# Patient Record
Sex: MALE | Race: OTHER | Employment: UNEMPLOYED | URBAN - METROPOLITAN AREA
[De-identification: names, ages, dates, MRNs, and addresses within clinical notes are randomized per-mention and may not be internally consistent; named-entity substitution may affect disease eponyms.]

---

## 2020-10-01 ENCOUNTER — APPOINTMENT (OUTPATIENT)
Dept: GENERAL RADIOLOGY | Age: 40
DRG: 710 | End: 2020-10-01
Payer: MEDICAID

## 2020-10-01 ENCOUNTER — HOSPITAL ENCOUNTER (INPATIENT)
Age: 40
LOS: 7 days | Discharge: HOME | DRG: 710 | End: 2020-10-08
Attending: EMERGENCY MEDICINE | Admitting: INTERNAL MEDICINE
Payer: MEDICAID

## 2020-10-01 PROBLEM — R00.0 TACHYCARDIA: Status: ACTIVE | Noted: 2020-10-01

## 2020-10-01 PROBLEM — I10 ESSENTIAL HYPERTENSION: Status: ACTIVE | Noted: 2020-10-01

## 2020-10-01 PROBLEM — A41.9 SEPSIS (HCC): Status: ACTIVE | Noted: 2020-10-01

## 2020-10-01 PROBLEM — E11.9 DMII (DIABETES MELLITUS, TYPE 2) (HCC): Status: ACTIVE | Noted: 2020-10-01

## 2020-10-01 PROBLEM — D72.9 NEUTROPHILIC LEUKOCYTOSIS: Status: ACTIVE | Noted: 2020-10-01

## 2020-10-01 PROBLEM — R50.9 FEVER: Status: ACTIVE | Noted: 2020-10-01

## 2020-10-01 PROBLEM — L03.115 CELLULITIS OF RIGHT FOOT: Status: ACTIVE | Noted: 2020-10-01

## 2020-10-01 LAB
A/G RATIO: 0.6 (ref 1.1–2.2)
ALBUMIN SERPL-MCNC: 2.8 G/DL (ref 3.4–5)
ALP BLD-CCNC: 158 U/L (ref 40–129)
ALT SERPL-CCNC: 15 U/L (ref 10–40)
ANION GAP SERPL CALCULATED.3IONS-SCNC: 8 MMOL/L (ref 3–16)
AST SERPL-CCNC: 12 U/L (ref 15–37)
BASOPHILS ABSOLUTE: 0.1 K/UL (ref 0–0.2)
BASOPHILS RELATIVE PERCENT: 0.6 %
BILIRUB SERPL-MCNC: 0.7 MG/DL (ref 0–1)
BUN BLDV-MCNC: 11 MG/DL (ref 7–20)
CALCIUM SERPL-MCNC: 8.6 MG/DL (ref 8.3–10.6)
CHLORIDE BLD-SCNC: 94 MMOL/L (ref 99–110)
CO2: 28 MMOL/L (ref 21–32)
CREAT SERPL-MCNC: 0.7 MG/DL (ref 0.9–1.3)
EOSINOPHILS ABSOLUTE: 0 K/UL (ref 0–0.6)
EOSINOPHILS RELATIVE PERCENT: 0.3 %
GFR AFRICAN AMERICAN: >60
GFR NON-AFRICAN AMERICAN: >60
GLOBULIN: 4.5 G/DL
GLUCOSE BLD-MCNC: 241 MG/DL (ref 70–99)
GLUCOSE BLD-MCNC: 277 MG/DL (ref 70–99)
HCT VFR BLD CALC: 42.5 % (ref 40.5–52.5)
HEMOGLOBIN: 14.2 G/DL (ref 13.5–17.5)
LACTIC ACID, SEPSIS: 1.2 MMOL/L (ref 0.4–1.9)
LYMPHOCYTES ABSOLUTE: 1.3 K/UL (ref 1–5.1)
LYMPHOCYTES RELATIVE PERCENT: 10.2 %
MAGNESIUM: 1.9 MG/DL (ref 1.8–2.4)
MCH RBC QN AUTO: 29.5 PG (ref 26–34)
MCHC RBC AUTO-ENTMCNC: 33.4 G/DL (ref 31–36)
MCV RBC AUTO: 88.3 FL (ref 80–100)
MONOCYTES ABSOLUTE: 1.2 K/UL (ref 0–1.3)
MONOCYTES RELATIVE PERCENT: 10.2 %
NEUTROPHILS ABSOLUTE: 9.6 K/UL (ref 1.7–7.7)
NEUTROPHILS RELATIVE PERCENT: 78.7 %
PDW BLD-RTO: 13.8 % (ref 12.4–15.4)
PERFORMED ON: ABNORMAL
PLATELET # BLD: 205 K/UL (ref 135–450)
PMV BLD AUTO: 8.4 FL (ref 5–10.5)
POTASSIUM REFLEX MAGNESIUM: 3.5 MMOL/L (ref 3.5–5.1)
RBC # BLD: 4.82 M/UL (ref 4.2–5.9)
SODIUM BLD-SCNC: 130 MMOL/L (ref 136–145)
TOTAL PROTEIN: 7.3 G/DL (ref 6.4–8.2)
WBC # BLD: 12.2 K/UL (ref 4–11)

## 2020-10-01 PROCEDURE — 83605 ASSAY OF LACTIC ACID: CPT

## 2020-10-01 PROCEDURE — 6370000000 HC RX 637 (ALT 250 FOR IP): Performed by: INTERNAL MEDICINE

## 2020-10-01 PROCEDURE — 2580000003 HC RX 258: Performed by: INTERNAL MEDICINE

## 2020-10-01 PROCEDURE — 2580000003 HC RX 258: Performed by: PHYSICIAN ASSISTANT

## 2020-10-01 PROCEDURE — 87040 BLOOD CULTURE FOR BACTERIA: CPT

## 2020-10-01 PROCEDURE — 6360000002 HC RX W HCPCS: Performed by: PHYSICIAN ASSISTANT

## 2020-10-01 PROCEDURE — 73630 X-RAY EXAM OF FOOT: CPT

## 2020-10-01 PROCEDURE — 85025 COMPLETE CBC W/AUTO DIFF WBC: CPT

## 2020-10-01 PROCEDURE — 6360000002 HC RX W HCPCS: Performed by: INTERNAL MEDICINE

## 2020-10-01 PROCEDURE — 93971 EXTREMITY STUDY: CPT

## 2020-10-01 PROCEDURE — 83735 ASSAY OF MAGNESIUM: CPT

## 2020-10-01 PROCEDURE — 80053 COMPREHEN METABOLIC PANEL: CPT

## 2020-10-01 PROCEDURE — 1200000000 HC SEMI PRIVATE

## 2020-10-01 PROCEDURE — 6370000000 HC RX 637 (ALT 250 FOR IP): Performed by: PHYSICIAN ASSISTANT

## 2020-10-01 PROCEDURE — 99284 EMERGENCY DEPT VISIT MOD MDM: CPT

## 2020-10-01 RX ORDER — KETOROLAC TROMETHAMINE 30 MG/ML
15 INJECTION, SOLUTION INTRAMUSCULAR; INTRAVENOUS ONCE
Status: COMPLETED | OUTPATIENT
Start: 2020-10-01 | End: 2020-10-01

## 2020-10-01 RX ORDER — ACETAMINOPHEN 325 MG/1
650 TABLET ORAL EVERY 4 HOURS PRN
Status: DISCONTINUED | OUTPATIENT
Start: 2020-10-01 | End: 2020-10-08 | Stop reason: HOSPADM

## 2020-10-01 RX ORDER — SODIUM CHLORIDE 0.9 % (FLUSH) 0.9 %
10 SYRINGE (ML) INJECTION EVERY 12 HOURS SCHEDULED
Status: DISCONTINUED | OUTPATIENT
Start: 2020-10-01 | End: 2020-10-08 | Stop reason: HOSPADM

## 2020-10-01 RX ORDER — ONDANSETRON 2 MG/ML
4 INJECTION INTRAMUSCULAR; INTRAVENOUS EVERY 4 HOURS PRN
Status: DISCONTINUED | OUTPATIENT
Start: 2020-10-01 | End: 2020-10-08 | Stop reason: HOSPADM

## 2020-10-01 RX ORDER — INSULIN LISPRO 100 [IU]/ML
0-12 INJECTION, SOLUTION INTRAVENOUS; SUBCUTANEOUS
Status: DISCONTINUED | OUTPATIENT
Start: 2020-10-02 | End: 2020-10-03

## 2020-10-01 RX ORDER — ACETAMINOPHEN 500 MG
1000 TABLET ORAL ONCE
Status: COMPLETED | OUTPATIENT
Start: 2020-10-01 | End: 2020-10-01

## 2020-10-01 RX ORDER — POLYETHYLENE GLYCOL 3350 17 G/17G
17 POWDER, FOR SOLUTION ORAL DAILY PRN
Status: DISCONTINUED | OUTPATIENT
Start: 2020-10-01 | End: 2020-10-08 | Stop reason: HOSPADM

## 2020-10-01 RX ORDER — 0.9 % SODIUM CHLORIDE 0.9 %
1000 INTRAVENOUS SOLUTION INTRAVENOUS ONCE
Status: COMPLETED | OUTPATIENT
Start: 2020-10-01 | End: 2020-10-01

## 2020-10-01 RX ORDER — NICOTINE POLACRILEX 4 MG
15 LOZENGE BUCCAL PRN
Status: DISCONTINUED | OUTPATIENT
Start: 2020-10-01 | End: 2020-10-03 | Stop reason: SDUPTHER

## 2020-10-01 RX ORDER — DEXTROSE MONOHYDRATE 25 G/50ML
12.5 INJECTION, SOLUTION INTRAVENOUS PRN
Status: DISCONTINUED | OUTPATIENT
Start: 2020-10-01 | End: 2020-10-03 | Stop reason: SDUPTHER

## 2020-10-01 RX ORDER — SODIUM CHLORIDE 0.9 % (FLUSH) 0.9 %
10 SYRINGE (ML) INJECTION PRN
Status: DISCONTINUED | OUTPATIENT
Start: 2020-10-01 | End: 2020-10-08 | Stop reason: HOSPADM

## 2020-10-01 RX ORDER — DEXTROSE MONOHYDRATE 50 MG/ML
100 INJECTION, SOLUTION INTRAVENOUS PRN
Status: DISCONTINUED | OUTPATIENT
Start: 2020-10-01 | End: 2020-10-03 | Stop reason: SDUPTHER

## 2020-10-01 RX ORDER — ACETAMINOPHEN 650 MG/1
650 SUPPOSITORY RECTAL EVERY 4 HOURS PRN
Status: DISCONTINUED | OUTPATIENT
Start: 2020-10-01 | End: 2020-10-08 | Stop reason: HOSPADM

## 2020-10-01 RX ORDER — LOSARTAN POTASSIUM 25 MG/1
50 TABLET ORAL DAILY
Status: DISCONTINUED | OUTPATIENT
Start: 2020-10-01 | End: 2020-10-03

## 2020-10-01 RX ORDER — 0.9 % SODIUM CHLORIDE 0.9 %
1200 INTRAVENOUS SOLUTION INTRAVENOUS ONCE
Status: COMPLETED | OUTPATIENT
Start: 2020-10-01 | End: 2020-10-01

## 2020-10-01 RX ORDER — SODIUM CHLORIDE 9 MG/ML
INJECTION, SOLUTION INTRAVENOUS CONTINUOUS
Status: DISCONTINUED | OUTPATIENT
Start: 2020-10-01 | End: 2020-10-03

## 2020-10-01 RX ORDER — ONDANSETRON 2 MG/ML
4 INJECTION INTRAMUSCULAR; INTRAVENOUS ONCE
Status: COMPLETED | OUTPATIENT
Start: 2020-10-01 | End: 2020-10-01

## 2020-10-01 RX ORDER — INSULIN LISPRO 100 [IU]/ML
0-6 INJECTION, SOLUTION INTRAVENOUS; SUBCUTANEOUS NIGHTLY
Status: DISCONTINUED | OUTPATIENT
Start: 2020-10-01 | End: 2020-10-03

## 2020-10-01 RX ADMIN — INSULIN LISPRO 2 UNITS: 100 INJECTION, SOLUTION INTRAVENOUS; SUBCUTANEOUS at 21:07

## 2020-10-01 RX ADMIN — LOSARTAN POTASSIUM 50 MG: 25 TABLET, FILM COATED ORAL at 21:07

## 2020-10-01 RX ADMIN — KETOROLAC TROMETHAMINE 15 MG: 30 INJECTION, SOLUTION INTRAMUSCULAR at 15:25

## 2020-10-01 RX ADMIN — VANCOMYCIN HYDROCHLORIDE 1500 MG: 10 INJECTION, POWDER, LYOPHILIZED, FOR SOLUTION INTRAVENOUS at 15:58

## 2020-10-01 RX ADMIN — CEFEPIME HYDROCHLORIDE 2 G: 2 INJECTION, POWDER, FOR SOLUTION INTRAVENOUS at 15:25

## 2020-10-01 RX ADMIN — ENOXAPARIN SODIUM 40 MG: 40 INJECTION SUBCUTANEOUS at 19:00

## 2020-10-01 RX ADMIN — SODIUM CHLORIDE 1200 ML: 9 INJECTION, SOLUTION INTRAVENOUS at 17:30

## 2020-10-01 RX ADMIN — SODIUM CHLORIDE 1000 ML: 9 INJECTION, SOLUTION INTRAVENOUS at 15:27

## 2020-10-01 RX ADMIN — ACETAMINOPHEN 1000 MG: 500 TABLET, FILM COATED ORAL at 15:26

## 2020-10-01 RX ADMIN — SODIUM CHLORIDE: 9 INJECTION, SOLUTION INTRAVENOUS at 17:30

## 2020-10-01 RX ADMIN — ONDANSETRON 4 MG: 2 INJECTION INTRAMUSCULAR; INTRAVENOUS at 15:25

## 2020-10-01 ASSESSMENT — PAIN DESCRIPTION - LOCATION
LOCATION: FOOT
LOCATION: LEG

## 2020-10-01 ASSESSMENT — ENCOUNTER SYMPTOMS
CHEST TIGHTNESS: 0
VOMITING: 0
ABDOMINAL PAIN: 0
DIARRHEA: 0
COLOR CHANGE: 1
SHORTNESS OF BREATH: 0
NAUSEA: 0

## 2020-10-01 ASSESSMENT — PAIN SCALES - GENERAL
PAINLEVEL_OUTOF10: 6
PAINLEVEL_OUTOF10: 4
PAINLEVEL_OUTOF10: 6
PAINLEVEL_OUTOF10: 6

## 2020-10-01 ASSESSMENT — PAIN DESCRIPTION - ORIENTATION: ORIENTATION: RIGHT

## 2020-10-01 ASSESSMENT — PAIN DESCRIPTION - PAIN TYPE
TYPE: ACUTE PAIN
TYPE: ACUTE PAIN

## 2020-10-01 NOTE — CONSULTS
Clinical Pharmacy Note: Pharmacy to Dose Vancomycin    Pilar Nicolas is a 45 y.o. male started on Vancomycin for foot cellulitis and wounds; consult received from Dr. Fiorella Dela Cruz to manage therapy. Also receiving the following antibiotics: Cefepime 2 g every 12 hours. Goal Trough Level: 15-20 mcg/mL    Assessment/Plan:  Initiate vancomycin 1500 mg IV every 12 hours. A vancomycin trough has been ordered for 10/3 @ 0300, prior to the 5th dose. Changes in regimen will be determined based on culture results, renal function, and clinical response. Pharmacy will continue to monitor and adjust regimen as necessary. Allergies:  Patient has no known allergies. Recent Labs     10/01/20  1536   CREATININE 0.7*       Recent Labs     10/01/20  1536   WBC 12.2*       Ht Readings from Last 1 Encounters:   10/01/20 5' 10\" (1.778 m)        Wt Readings from Last 1 Encounters:   10/01/20 226 lb 8 oz (102.7 kg)         Estimated Creatinine Clearance: 170 mL/min (A) (based on SCr of 0.7 mg/dL (L)).       Thank you for the consult,    Cherie Tay, PharmD  PGY-1 Pharmacy Resident  [id number]

## 2020-10-01 NOTE — ED PROVIDER NOTES
PE is also reported that it does not appear that he is ever had a vascular study performed in his lower extremities. He does have reports of right calf pain with reproducible symptoms with Homans examination. He has had chills but is unsure if he has had a documented fever and does have a low-grade fever here in the emergency department. He denies any is having chest pain palpitations or shortness of breath. He reports that he has had some mild headache pain that is been intermittent but is currently headache free. Patient's had no potential for sick contacts. He states he has not had recent antibiotic treatment. Patient goes on to report that he has no additional complaints voiced at present. Nursing Notes were all reviewed and agreed with or any disagreements were addressed in the HPI. REVIEW OF SYSTEMS    (2-9 systems for level 4, 10 or more for level 5)     Review of Systems   Constitutional: Positive for chills. Negative for activity change and fever. Respiratory: Negative for chest tightness and shortness of breath. Cardiovascular: Positive for leg swelling. Negative for chest pain and palpitations. Gastrointestinal: Negative for abdominal pain, diarrhea, nausea and vomiting. Genitourinary: Negative for dysuria and flank pain. Musculoskeletal: Positive for gait problem. Skin: Positive for color change, rash and wound. Neurological: Negative for seizures, syncope and light-headedness. Positives and Pertinent negatives as per HPI. Except as noted above in the ROS, all other systems were reviewed and negative. PAST MEDICAL HISTORY   History reviewed. No pertinent past medical history. SURGICAL HISTORY   History reviewed. No pertinent surgical history. CURRENTMEDICATIONS       Previous Medications    No medications on file       ALLERGIES     Patient has no known allergies. FAMILYHISTORY     History reviewed. No pertinent family history.        SOCIAL HISTORY       Social History     Tobacco Use    Smoking status: Never Smoker    Smokeless tobacco: Never Used   Substance Use Topics    Alcohol use: Yes     Comment: occ    Drug use: Never       SCREENINGS             PHYSICAL EXAM    (up to 7 for level 4, 8 or more for level 5)     ED Triage Vitals [10/01/20 1121]   BP Temp Temp Source Pulse Resp SpO2 Height Weight   (!) 174/114 100.4 °F (38 °C) Oral 112 18 96 % 5' 10\" (1.778 m) 226 lb 8 oz (102.7 kg)       Physical Exam  Vitals signs and nursing note reviewed. Constitutional:       General: He is awake. He is not in acute distress. Appearance: Normal appearance. He is well-developed. He is not ill-appearing or diaphoretic. HENT:      Head: Normocephalic and atraumatic. Right Ear: External ear normal.      Left Ear: External ear normal.   Eyes:      General: No scleral icterus. Right eye: No discharge. Left eye: No discharge. Conjunctiva/sclera: Conjunctivae normal.   Neck:      Musculoskeletal: Normal range of motion. Vascular: No JVD. Cardiovascular:      Rate and Rhythm: Regular rhythm. Tachycardia present. Heart sounds: No murmur. No friction rub. No gallop. Comments: Calf is supple but firm. Reproducible tenderness with Homans on the right. Negative Homans on the left. Pulmonary:      Effort: Pulmonary effort is normal. No accessory muscle usage or respiratory distress. Breath sounds: Normal breath sounds. No wheezing, rhonchi or rales. Abdominal:      General: There is no distension. Palpations: Abdomen is soft. Abdomen is not rigid. There is no mass. Tenderness: There is no abdominal tenderness. There is no guarding or rebound. Musculoskeletal:      Right lower le+ Edema present. Left lower leg: No edema. Feet:    Skin:     General: Skin is warm and dry. Comments: Lesions on toes.   See details and clinical image capture   Neurological:      Mental Status: He is alert and oriented to person, place, and time. GCS: GCS eye subscore is 4. GCS verbal subscore is 5. GCS motor subscore is 6. Cranial Nerves: No cranial nerve deficit. Sensory: No sensory deficit. Coordination: Coordination normal.   Psychiatric:         Behavior: Behavior normal. Behavior is cooperative. Clinical image:          DIAGNOSTIC RESULTS   LABS:    Labs Reviewed   CBC WITH AUTO DIFFERENTIAL - Abnormal; Notable for the following components:       Result Value    WBC 12.2 (*)     Neutrophils Absolute 9.6 (*)     All other components within normal limits    Narrative:     Performed at:  [facility] [address]  [place], [place]   Phone [phone]   COMPREHENSIVE METABOLIC PANEL W/ REFLEX TO MG FOR LOW K - Abnormal; Notable for the following components:    Sodium 130 (*)     Chloride 94 (*)     Glucose 277 (*)     CREATININE 0.7 (*)     Alb 2.8 (*)     Albumin/Globulin Ratio 0.6 (*)     Alkaline Phosphatase 158 (*)     AST 12 (*)     All other components within normal limits    Narrative:     Performed at:  [facility] [address]  [place], [place]   Phone [phone]   CULTURE, BLOOD 1   CULTURE, BLOOD 2   LACTATE, SEPSIS    Narrative:     Performed at:  [facility]  [address] [employer]  [place], [place]   Phone [phone]   MAGNESIUM    Narrative:     Performed at:  [facility] [address]  [place], [place]   Phone [phone]   URINE RT REFLEX TO CULTURE       All other labs were within normal range or not returned as of this dictation. EKG: All EKG's are interpreted by the Emergency Department Physician in the absence of a cardiologist.  Please see their note for interpretation of EKG.       RADIOLOGY:   Non-plain film images such as CT, Ultrasound and MRI are read by the radiologist. Plain radiographic images Room Number         0025   Corporate ID      [id number]           Trice Mendez   Ordering          Betty Pineda., Interpreting        Og Joya MD  Physician         PA                 Physician  Procedure Type of Study:   Veins:Lower Extremities DVT Study, VL EXTREMITY VENOUS DUPLEX RIGHT. Tech Comments Right No evidence of deep vein or superficial vein thrombosis involving the right lower extremity and the left common femoral vein. PROCEDURES   Unless otherwise noted below, none     Procedures    CRITICAL CARE TIME   Because of the high probability of sudden clinical deterioration of the patients condition and to prevent further deterioration, my critical care time involved my full attention to the patients condition, and included chart data review, documentation, medication ordering, viewing the patients old records, reevaluation of the patient's cardiac, pulmonary, and neurological status. Reassessing vital signs. Consutlations with off service physician. Ordering, interpreting reviewing diagnostic testing. Therefore my critical care time was 31 minutes of direct attention to the patients condition and did not include time spent on procedures.       SEP-1 CORE MEASURE DATA    Classification: sepsis    Amount of fluids ordered: at least 30mL/kg based on ideal body weight due to obesity defined as BMI >30 (patient's BMI is Body mass index is 32.5 kg/m².)    Time at which sepsis was identified: 1510    Broad-spectrum antibiotics chosen: based on sepsis order-set for a suspected source of: Skin and Soft Tissue    Repeat lactate level: not indicated    On reassessment after fluid resuscitation:   Vitals update: BP (!) 166/110   Pulse 112   Temp 100.4 °F (38 °C) (Oral)   Resp 18   Ht 5' 10\" (1.778 m)   Wt 226 lb 8 oz (102.7 kg)   SpO2 96%   BMI 32.50 kg/m² , cardiopulmonary exam: Improving tachycardia, capillary refill: Brisk and unchanged, peripheral pulses: Brisk and unchanged, skin examination: Unchanged      CONSULTS:  PHARMACY TO DOSE VANCOMYCIN      EMERGENCY DEPARTMENT COURSE and DIFFERENTIAL DIAGNOSIS/MDM:   Vitals:    Vitals:    10/01/20 1121 10/01/20 1442 10/01/20 1443 10/01/20 1530   BP: (!) 174/114 (!) 160/106  (!) 166/110   Pulse: 112      Resp: 18      Temp: 100.4 °F (38 °C)      TempSrc: Oral      SpO2: 96%  95% 96%   Weight: 226 lb 8 oz (102.7 kg)      Height: 5' 10\" (1.778 m)          Patient was given the following medications:  Medications   sodium chloride flush 0.9 % injection 10 mL (has no administration in time range)   sodium chloride flush 0.9 % injection 10 mL (has no administration in time range)   0.9 % sodium chloride bolus (has no administration in time range)   cefepime (MAXIPIME) 2 g IVPB minibag (0 g Intravenous Stopped 10/1/20 1558)   vancomycin (VANCOCIN) 1,500 mg in dextrose 5 % 250 mL IVPB (1,500 mg Intravenous New Bag 10/1/20 1558)   0.9 % sodium chloride bolus (0 mLs Intravenous Stopped 10/1/20 1659)   acetaminophen (TYLENOL) tablet 1,000 mg (1,000 mg Oral Given 10/1/20 1526)   ketorolac (TORADOL) injection 15 mg (15 mg Intravenous Given 10/1/20 1525)   ondansetron (ZOFRAN) injection 4 mg (4 mg Intravenous Given 10/1/20 1525)         The patient's detailed history of present illness is documented as above. Upon arrival to the emergency department the patient's vital signs are as documented. The patient is noted to be hemodynamically stable and afebrile. Physical examination findings are as above. IV access was obtained. Laboratory testing and work-up was initiated. Patient was given Toradol for pain relief and Tylenol for his low-grade fever. Laboratory testing work-up was initiated. Initiation of vancomycin and cefepime for cellulitis of skin with associated lymphangitic streaking.   CBC demonstrates leukocytosis this in addition to the patient's tachycardia as well as low-grade fevers concerning for sepsis. No evidence of anemia and/or thrombocytopenia. BUN is 11 creatinine 0.7 nonfasting glucose is 277 in someone who does not have a history of diabetes mellitus. Sodium 138 chloride 94 electrolytes and LFTs are as documented. Mild elevation in his alkaline phosphatase with no evidence of bilirubinemia and or transaminitis. Acid is not elevated at 1.2. Magnesium is 1.9. Right foot radiographs demonstrate soft tissue swelling with no evidence of acute bony abnormality. Right lower extremity Doppler examination demonstrates no evidence of superficial or deep vein thrombosis. In light of the above-mentioned the patient will require ongoing care management on an inpatient basis. Case was discussed directly with the Dr. Berna Delacruz the hospitalist who accepts the patient for admission for ongoing care management the diagnoses below. FINAL IMPRESSION      1. Sepsis, due to unspecified organism, unspecified whether acute organ dysfunction present ([address]    2. Cellulitis of skin with lymphangitis    3. Chronic ulcer of toe, limited to breakdown of skin, unspecified laterality ([address]    4.  Hyperglycemia          DISPOSITION/PLAN   DISPOSITION Admitted 10/01/2020 05:41:02 PM         (Please note that portions of this note were completed with a voice recognition program.  Efforts were made to edit the dictations but occasionally words are mis-transcribed.)    Lesli Viveros PA-C (electronically signed)           Cruz Lazo PA-C  10/01/20 7934

## 2020-10-01 NOTE — PROGRESS NOTES
Patient transferred from ED to [facility]. Upon arrival, BP elevated to 157/108, message sent to admitting doctor to see if something for BP can be ordered.

## 2020-10-01 NOTE — ED NOTES
Patient removed eva hose to expose wounds to second, third and forth toe on right foot.       Ericka Dial RN  10/01/20 0060

## 2020-10-01 NOTE — ED NOTES
Bed: 25  Expected date:   Expected time:   Means of arrival: Walk In  Comments:     Sindhu Conner RN  10/01/20 9111

## 2020-10-01 NOTE — ED PROVIDER NOTES
I personally evaluated and examined the patient in conjunction with the MAYRA and agree with the assessment, treatment plan and disposition of the patient as recorded by the MAYRA. I reviewed pertinent nursing notes, triage notes, vital signs, past medical history, family and social history, medications, and allergies. Complete review of systems was conducted by the MAYRA and/or myself. Review of systems is negative except as documented in the history of present illness. Brief HPI: This is a 73-year-old gentleman presents emergency department chief complaint of right third toe infection as well as swelling and redness of the right lower leg. No chest pain or shortness of breath. Positive associated fever. Pain is 3/10, sharp/burning without radiation. No alleviating aggravating factors. Physical Exam: General: Patient is in no acute distress   Head: Normocephalic, atraumatic, pupils are equal and reactive to light. EOMI. Neck: Neck is supple. No JVD noted. Cardiovascular: Capillary refill less than 2 seconds. Initially tachycardic on the monitor. Improved with IV fluids. Lungs: No respiratory distress  Abdomen: soft, non-tender, non-distended   Extremities: no lower extremity edema. Capillary refill is less than 2 seconds. Redness and slight swelling of the anterior lower leg on the  right. There is also a large amount of swelling with an ulcer with discharge on the top of the right third toe. Skin: no cyanosis or pallor; no rashes noted   Neuro: CN's 2-12 are grossly intact. No focal neurologic deficit appreciated. Sepsis panel initiated include aggressive fluid resuscitation and broad-spectrum antibiotics. X-ray performed of the right foot which is negative. DVT ultrasound performed is negative as well. Patient to be admitted for further management.     CRITICAL CARE TIME: 32 minutes excluding billable procedure time: aggressive fluid resuscitation in sepsis, multiple re-evaluations, complex MDM, potential for deterioration. FINAL IMPRESSION     1. Sepsis, due to unspecified organism, unspecified whether acute organ dysfunction present [address] [address]    2. Cellulitis of skin with lymphangitis    3. Chronic ulcer of toe, limited to breakdown of skin, unspecified laterality ([facility] [address]    4. Hyperglycemia            Electronically signed by:   Scott Hernandez DO  10/01/20 8813

## 2020-10-01 NOTE — ED NOTES
This RN gave report to RN, Emerald Lara. Nothing infusing upon transfer. VSS. No symptoms of distress noted. All belongings placed in bag and given to pt.  Pt wheeled by RN on floor to room     hospitals  10/01/20 9630

## 2020-10-02 LAB
ANION GAP SERPL CALCULATED.3IONS-SCNC: 10 MMOL/L (ref 3–16)
BASOPHILS ABSOLUTE: 0 K/UL (ref 0–0.2)
BASOPHILS RELATIVE PERCENT: 0.4 %
BUN BLDV-MCNC: 11 MG/DL (ref 7–20)
CALCIUM SERPL-MCNC: 8.1 MG/DL (ref 8.3–10.6)
CHLORIDE BLD-SCNC: 100 MMOL/L (ref 99–110)
CO2: 26 MMOL/L (ref 21–32)
CREAT SERPL-MCNC: 0.6 MG/DL (ref 0.9–1.3)
EOSINOPHILS ABSOLUTE: 0.2 K/UL (ref 0–0.6)
EOSINOPHILS RELATIVE PERCENT: 2.4 %
ESTIMATED AVERAGE GLUCOSE: 289.1 MG/DL
GFR AFRICAN AMERICAN: >60
GFR NON-AFRICAN AMERICAN: >60
GLUCOSE BLD-MCNC: 205 MG/DL (ref 70–99)
GLUCOSE BLD-MCNC: 212 MG/DL (ref 70–99)
GLUCOSE BLD-MCNC: 240 MG/DL (ref 70–99)
GLUCOSE BLD-MCNC: 264 MG/DL (ref 70–99)
GLUCOSE BLD-MCNC: 295 MG/DL (ref 70–99)
HBA1C MFR BLD: 11.7 %
HCT VFR BLD CALC: 39.4 % (ref 40.5–52.5)
HEMOGLOBIN: 13.2 G/DL (ref 13.5–17.5)
LYMPHOCYTES ABSOLUTE: 1.2 K/UL (ref 1–5.1)
LYMPHOCYTES RELATIVE PERCENT: 12.1 %
MAGNESIUM: 2.1 MG/DL (ref 1.8–2.4)
MCH RBC QN AUTO: 29.7 PG (ref 26–34)
MCHC RBC AUTO-ENTMCNC: 33.6 G/DL (ref 31–36)
MCV RBC AUTO: 88.6 FL (ref 80–100)
MONOCYTES ABSOLUTE: 1.1 K/UL (ref 0–1.3)
MONOCYTES RELATIVE PERCENT: 11.5 %
NEUTROPHILS ABSOLUTE: 7.1 K/UL (ref 1.7–7.7)
NEUTROPHILS RELATIVE PERCENT: 73.6 %
PDW BLD-RTO: 14.2 % (ref 12.4–15.4)
PERFORMED ON: ABNORMAL
PLATELET # BLD: 206 K/UL (ref 135–450)
PMV BLD AUTO: 8.6 FL (ref 5–10.5)
POTASSIUM REFLEX MAGNESIUM: 3.5 MMOL/L (ref 3.5–5.1)
RBC # BLD: 4.45 M/UL (ref 4.2–5.9)
SODIUM BLD-SCNC: 136 MMOL/L (ref 136–145)
WBC # BLD: 9.7 K/UL (ref 4–11)

## 2020-10-02 PROCEDURE — 6360000002 HC RX W HCPCS: Performed by: INTERNAL MEDICINE

## 2020-10-02 PROCEDURE — G0008 ADMIN INFLUENZA VIRUS VAC: HCPCS | Performed by: INTERNAL MEDICINE

## 2020-10-02 PROCEDURE — 83735 ASSAY OF MAGNESIUM: CPT

## 2020-10-02 PROCEDURE — 6370000000 HC RX 637 (ALT 250 FOR IP): Performed by: INTERNAL MEDICINE

## 2020-10-02 PROCEDURE — 83036 HEMOGLOBIN GLYCOSYLATED A1C: CPT

## 2020-10-02 PROCEDURE — 2580000003 HC RX 258: Performed by: INTERNAL MEDICINE

## 2020-10-02 PROCEDURE — 85025 COMPLETE CBC W/AUTO DIFF WBC: CPT

## 2020-10-02 PROCEDURE — 90686 IIV4 VACC NO PRSV 0.5 ML IM: CPT | Performed by: INTERNAL MEDICINE

## 2020-10-02 PROCEDURE — 80048 BASIC METABOLIC PNL TOTAL CA: CPT

## 2020-10-02 PROCEDURE — 1200000000 HC SEMI PRIVATE

## 2020-10-02 RX ORDER — NICOTINE POLACRILEX 4 MG
15 LOZENGE BUCCAL PRN
Status: DISCONTINUED | OUTPATIENT
Start: 2020-10-02 | End: 2020-10-02 | Stop reason: SDUPTHER

## 2020-10-02 RX ORDER — DEXTROSE MONOHYDRATE 50 MG/ML
100 INJECTION, SOLUTION INTRAVENOUS PRN
Status: DISCONTINUED | OUTPATIENT
Start: 2020-10-02 | End: 2020-10-02 | Stop reason: SDUPTHER

## 2020-10-02 RX ORDER — DEXTROSE MONOHYDRATE 25 G/50ML
12.5 INJECTION, SOLUTION INTRAVENOUS PRN
Status: DISCONTINUED | OUTPATIENT
Start: 2020-10-02 | End: 2020-10-02 | Stop reason: SDUPTHER

## 2020-10-02 RX ADMIN — INSULIN LISPRO 4 UNITS: 100 INJECTION, SOLUTION INTRAVENOUS; SUBCUTANEOUS at 16:15

## 2020-10-02 RX ADMIN — ENOXAPARIN SODIUM 40 MG: 40 INJECTION SUBCUTANEOUS at 10:18

## 2020-10-02 RX ADMIN — CEFEPIME HYDROCHLORIDE 2 G: 2 INJECTION, POWDER, FOR SOLUTION INTRAVENOUS at 03:39

## 2020-10-02 RX ADMIN — METFORMIN HYDROCHLORIDE 500 MG: 500 TABLET ORAL at 15:37

## 2020-10-02 RX ADMIN — VANCOMYCIN HYDROCHLORIDE 1500 MG: 10 INJECTION, POWDER, LYOPHILIZED, FOR SOLUTION INTRAVENOUS at 15:37

## 2020-10-02 RX ADMIN — INSULIN LISPRO 6 UNITS: 100 INJECTION, SOLUTION INTRAVENOUS; SUBCUTANEOUS at 11:34

## 2020-10-02 RX ADMIN — CEFEPIME HYDROCHLORIDE 2 G: 2 INJECTION, POWDER, FOR SOLUTION INTRAVENOUS at 13:53

## 2020-10-02 RX ADMIN — INSULIN LISPRO 4 UNITS: 100 INJECTION, SOLUTION INTRAVENOUS; SUBCUTANEOUS at 10:19

## 2020-10-02 RX ADMIN — LOSARTAN POTASSIUM 50 MG: 25 TABLET, FILM COATED ORAL at 10:19

## 2020-10-02 RX ADMIN — VANCOMYCIN HYDROCHLORIDE 1500 MG: 10 INJECTION, POWDER, LYOPHILIZED, FOR SOLUTION INTRAVENOUS at 04:15

## 2020-10-02 RX ADMIN — INFLUENZA A VIRUS A/VICTORIA/2454/2019 IVR-207 (H1N1) ANTIGEN (PROPIOLACTONE INACTIVATED), INFLUENZA A VIRUS A/HONG KONG/2671/2019 IVR-208 (H3N2) ANTIGEN (PROPIOLACTONE INACTIVATED), INFLUENZA B VIRUS B/VICTORIA/705/2018 BVR-11 ANTIGEN (PROPIOLACTONE INACTIVATED), INFLUENZA B VIRUS B/PHUKET/3073/2013 BVR-1B ANTIGEN (PROPIOLACTONE INACTIVATED) 0.5 ML: 15; 15; 15; 15 INJECTION, SUSPENSION INTRAMUSCULAR at 10:19

## 2020-10-02 RX ADMIN — SODIUM CHLORIDE: 9 INJECTION, SOLUTION INTRAVENOUS at 13:53

## 2020-10-02 RX ADMIN — INSULIN LISPRO 2 UNITS: 100 INJECTION, SOLUTION INTRAVENOUS; SUBCUTANEOUS at 20:10

## 2020-10-02 NOTE — PROGRESS NOTES
[facility] PROGRESS NOTE    10/2/2020 1:23 PM        Name: Sabrina Oliveros . Admitted: 10/1/2020  Primary Care Provider: No primary care provider on file. (Tel: None)    Brief Course: Patient is a 71-year-old man with no prior known history of chronic illness. He presents to the emergency room for evaluation of increased pain, swelling and erythema of his right forefoot. He reports having chills at home but does not know if he had a fever as he has not checked his temperature. He reports moderately severe burning pain of the distal aspect of his right foot. the emergency room he was found to have cellulitis of his right foot and associated sepsis. In addition, evaluation revealed previously undiagnosed hypertension and diabetes mellitus. He is being admitted for further evaluation and treatment. Associated signs and symptoms do not include nausea, vomiting, abdominal pain, hemoptysis, hematochezia, diarrhea, constipation or urinary symptoms.     CC: pain, swelling and erythema of his right foot  Subjective: Patient reports that he does not have a PCP and has not gotten any medical care in the past.  Denies having medical insurance. Pain in right foot improved since yesterday.     Reviewed interval ancillary notes    Current Medications  metFORMIN (GLUCOPHAGE) tablet 500 mg, BID WC  glucose (GLUTOSE) 40 % oral gel 15 g, PRN  dextrose 50 % IV solution, PRN  glucagon (rDNA) injection 1 mg, PRN  dextrose 5 % solution, PRN  sodium chloride flush 0.9 % injection 10 mL, 2 times per day  sodium chloride flush 0.9 % injection 10 mL, PRN  cefepime (MAXIPIME) 2 g IVPB minibag, Q12H  sodium chloride flush 0.9 % injection 10 mL, 2 times per day  sodium chloride flush 0.9 % injection 10 mL, PRN  acetaminophen (TYLENOL) tablet 650 mg, Q4H PRN    Or  acetaminophen (TYLENOL) suppository 650 mg, Q4H PRN  polyethylene glycol (GLYCOLAX) packet 17 g, Daily PRN  ondansetron (ZOFRAN) injection 4 mg, Q4H PRN  0.9 % sodium chloride infusion, Continuous  enoxaparin (LOVENOX) injection 40 mg, Daily  vancomycin (VANCOCIN) 1,500 mg in dextrose 5 % 250 mL IVPB, Q12H  losartan (COZAAR) tablet 50 mg, Daily  insulin lispro (1 Unit Dial) 0-12 Units, TID WC  insulin lispro (1 Unit Dial) 0-6 Units, Nightly  glucose (GLUTOSE) 40 % oral gel 15 g, PRN  dextrose 50 % IV solution, PRN  glucagon (rDNA) injection 1 mg, PRN  dextrose 5 % solution, PRN    Objective:  BP (!) 144/102   Pulse 93   Temp 98.9 °F (37.2 °C) (Oral)   Resp 18   Ht 5' 10\" (1.778 m)   Wt 226 lb 8 oz (102.7 kg)   SpO2 97%   BMI 32.50 kg/m²     Intake/Output Summary (Last 24 hours) at 10/2/2020 1323  Last data filed at 10/2/2020 1208  Gross per 24 hour   Intake 2752.5 ml   Output --   Net 2752.5 ml      Wt Readings from Last 3 Encounters:   10/01/20 226 lb 8 oz (102.7 kg)       General appearance:Overweight  male,  Appears comfortable  ENT: Moist oral mucosa. Trachea midline, no adenopathy. Cardiovascular: Regular rhythm, normal S1, S2. No murmur. No edema in lower extremities  Respiratory: Not using accessory muscles. Good inspiratory effort. Clear to auscultation bilaterally, no wheeze or crackles. GI: Abdomen soft, no tenderness, not distended, normal bowel sounds  Musculoskeletal: No cyanosis in digits, neck supple  Neurology: CN 2-12 grossly intact. No speech or motor deficits  Psych: Normal affect. Alert and oriented in time, place and person  Skin: Warm, dry, normal turgor, Right foot with deformity of toes, swelling and erythema, distal plantar aspect of his foot is very tender. Extremity exam shows brisk capillary refill.   Peripheral pulses are palpable in lower extremities     Labs and Tests:  CBC:   Recent Labs     10/01/20  1536 10/02/20  0525   WBC 12.2* 9.7   HGB 14.2 13.2*    206     BMP:    Recent Labs     10/01/20  1536 10/02/20  0525   * 136   K 3.5 3.5   CL 94* 100   CO2 28 26   BUN 11 11   CREATININE 0.7* 0.6*   GLUCOSE 277* 264*     Hepatic:   Recent Labs     10/01/20  1536   AST 12*   ALT 15   BILITOT 0.7   ALKPHOS 158*     VL Extremity Venous Right   Final Result      XR FOOT RIGHT (MIN 3 VIEWS)   Final Result   No acute osseous abnormality right foot. Soft tissue edema presumably reflecting cellulitis, contusion and/or sprain. Follow-up imaging recommended if pain persists or worsens following   conservative management. Problem List  Principal Problem:    Cellulitis of right foot  Active Problems:    Sepsis ([address]    Fever    Tachycardia    Neutrophilic leukocytosis    DMII (diabetes mellitus, type 2) (HCC)    Essential hypertension  Resolved Problems:    * No resolved hospital problems. *       Assessment & Plan:     Right foot cellulitis:   Likely has abscess. Xray of rt foot showed no evidence of osteomyelitis. Will keep on Iv antibiotics while we wait for c/s. Podiatry and wound care consulted. Type-2 DM:   Started on Metformin 500 mg po bid. POC blood sugars and SSI to cover. HTN:   Patient was started on losartan 50 mg p.o. daily. BP readings remain mildly elevated with heart rate in 90s. Will add low-dose metoprolol p.o. twice daily.     IV Access: Peripheral IV  Guerin: No  Diet: DIET CARB CONTROL;  Code:Full Code  DVT PPX Lovenox subcu daily  Disposition pending podiatry evaluation, switch antibiotics to oral, social work consult placed for help with scripts, no medical insurance      Jens Mena MD   10/2/2020 1:23 PM

## 2020-10-02 NOTE — PROGRESS NOTES
Morning assessment complete. BP elevated, scheduled meds administered. Will monitor. Pt denies pain, states he feels like his foot is freezing on RLE. Noted swelling/redness/open blisters to RLE. Pt independent in the room, low fall risk. Denies any further needs. Pt demonstrated how to reach nurse with call light. Call light in reach. Will continue to monitor. The care plan and education has been reviewed and mutually agreed upon with the patient. 1400 minimal drainage from foot wound, message sent to Dr. Ryan Smith to ensure she still wants wound cx sent. 1404 per Dr. Ryan Smith, not to send wound cx as there's no drainage will not be helpful in choosing antibiotics for patient.      6998 MRI fax sheet sent down, language line used to complete

## 2020-10-02 NOTE — PROGRESS NOTES
Physician Progress Note      PATIENT:               Dior Knox  CSN #:                  [id number]  :                       1980  ADMIT DATE:       10/1/2020 2:41 PM  [facility] DATE:  RESPONDING  PROVIDER #:        Sulema Paulino MD          QUERY TEXT:    Dear Dr. Ursula Gonzalez,    Patient admitted with sepsis, noted to have abnormal sodium of 130. If   possible, please document in progress notes and discharge summary if you are   evaluating and/or treating any of the following: The medical record reflects the following:  Risk Factors: Hypertension, New diabetic  Clinical Indicators: Sodium was 130 in ER. Treatment: Antibiotics, IV fluids, Podiatry consult, imaging. Thank you. Tarsha Pagan RN CDS    [email or web address]  Options provided:  -- Hyponatremia  -- Hyponatremia not clinically significant  -- Other - I will add my own diagnosis  -- Disagree - Not applicable / Not valid  -- Disagree - Clinically unable to determine / Unknown  -- Refer to Clinical Documentation Reviewer    PROVIDER RESPONSE TEXT:    Hyponatremia is not clinically significant.     Query created by: Kyrie Howard on 10/2/2020 9:28 AM      Electronically signed by:  Sulema Paulino MD 10/2/2020 1:18 PM

## 2020-10-02 NOTE — CONSULTS
Department of Podiatry Consult Note      Reason for Consult:  Cellulitis of right foot  Requesting Physician:  Berna Delacruz MD    CHIEF COMPLAINT:  Pain and redness, right foot    HISTORY OF PRESENT ILLNESS:                The patient is a 45 y.o. male with significant past medical history as stated below who is consulted for cellulitis of the right foot. The patient explains that he is unsure how long the wound and redness of the foot have been present for. He does relate to pain, especially over the top of the foot. He denies any injury. It appears patient has newly diagnosed diabetes during this admission. He relates to sensation loss in both feet . Past Medical History:    History reviewed. No pertinent past medical history. Past Surgical History:    History reviewed. No pertinent surgical history.   Current Medications:    Current Facility-Administered Medications: metFORMIN (GLUCOPHAGE) tablet 500 mg, 500 mg, Oral, BID WC  sodium chloride flush 0.9 % injection 10 mL, 10 mL, Intravenous, 2 times per day  sodium chloride flush 0.9 % injection 10 mL, 10 mL, Intravenous, PRN  cefepime (MAXIPIME) 2 g IVPB minibag, 2 g, Intravenous, Q12H  sodium chloride flush 0.9 % injection 10 mL, 10 mL, Intravenous, 2 times per day  sodium chloride flush 0.9 % injection 10 mL, 10 mL, Intravenous, PRN  acetaminophen (TYLENOL) tablet 650 mg, 650 mg, Oral, Q4H PRN **OR** acetaminophen (TYLENOL) suppository 650 mg, 650 mg, Rectal, Q4H PRN  polyethylene glycol (GLYCOLAX) packet 17 g, 17 g, Oral, Daily PRN  ondansetron (ZOFRAN) injection 4 mg, 4 mg, Intravenous, Q4H PRN  0.9 % sodium chloride infusion, , Intravenous, Continuous  enoxaparin (LOVENOX) injection 40 mg, 40 mg, Subcutaneous, Daily  vancomycin (VANCOCIN) 1,500 mg in dextrose 5 % 250 mL IVPB, 1,500 mg, Intravenous, Q12H  losartan (COZAAR) tablet 50 mg, 50 mg, Oral, Daily  insulin lispro (1 Unit Dial) 0-12 Units, 0-12 Units, Subcutaneous, TID WC  insulin lispro (1 Unit Dial) 0-6 Units, 0-6 Units, Subcutaneous, Nightly  glucose (GLUTOSE) 40 % oral gel 15 g, 15 g, Oral, PRN  dextrose 50 % IV solution, 12.5 g, Intravenous, PRN  glucagon (rDNA) injection 1 mg, 1 mg, Intramuscular, PRN  dextrose 5 % solution, 100 mL/hr, Intravenous, PRN  Allergies:   Patient has no known allergies. Social History:    TOBACCO:   reports that he has quit smoking. His smoking use included cigarettes. He has a 2.50 pack-year smoking history. He has never used smokeless tobacco.  ETOH:   reports current alcohol use of about 3.0 standard drinks of alcohol per week. DRUGS:   reports no history of drug use. Family History:       Problem Relation Age of Onset    Diabetes Mother     Diabetes Father     Diabetes Sister      REVIEW OF SYSTEMS:    CONSTITUTIONAL:  negative  RESPIRATORY:  negative  CARDIOVASCULAR:  negative  GASTROINTESTINAL:  negative  MUSCULOSKELETAL:  negative  NEUROLOGICAL:  negative  PHYSICAL EXAM:      Vitals:    BP (!) 144/102   Pulse 93   Temp 98.9 °F (37.2 °C) (Oral)   Resp 18   Ht 5' 10\" (1.778 m)   Wt 226 lb 8 oz (102.7 kg)   SpO2 97%   BMI 32.50 kg/m²     LABS:   Recent Labs     10/01/20  1536 10/02/20  0525   WBC 12.2* 9.7   HGB 14.2 13.2*   HCT 42.5 39.4*    206     Recent Labs     10/02/20  0525      K 3.5      CO2 26   BUN 11   CREATININE 0.6*     Recent Labs     10/01/20  1536   PROT 7.3       VASCULAR: DP and PT pulses are palpable 2/4 b/l. CFT is brisk to the digits of the foot b/l. Skin temperature is warm to cool from proximal to distal with focal calor noted to the right foot. Moderate edema noted to the right foot. No pain with calf compression b/l. NEUROLOGIC: Gross and epicritic sensation is diminished b/l. DERMATOLOGIC: Full-thickness ulceration noted to the dorsal aspect of the 3rd PIPJ of the b/l foot with significant hyperkeratotic rim. Hyperkeratosis noted to the 2nd and 4th PIPJ's as well.  Erythema noted to the dorsal aspect of the right midfoot, with mild fluctuance underlying. Area is painful to palpation. MUSCULOSKELETAL: Muscle strength is 5/5 for all pedal groups tested. Severe rigid hammertoe deformities noted to digits 2-5 b/l. Moderate HAV deformity noted b/l. IMPRESSION/RECOMMENDATIONS:    1. Cellulitis, right foot - concern for underlying abscess  2. Full-thickness ulceration, right 3rd digit - concern for underlying osteomyelitis  3. Full-thickness ulceration, left 3rd digit  4. Hammertoe deformities, digits 2-5 b/l  5. Diabetes mellitus with peripheral neuropathy - new onset, hemoglobin A1c of 11.7%    - Patient was seen and examined at bedside this afternoon.  - Labs reviewed: leukcytosis on admission has resolved  - ESR pending  - Radiographs reviewed. No signs of obvious osteomyelitis or gas gangrene on radiographs. However, due to clinical appearance of 3rd digit on the right foot, there is concern for osteomyelitis of the digit and abscess of the midfoot  - Agree with IV antibiotics  - Severe hammertoe deformities have led to increased pressure on the dorsal digits. DISPO: MRI of the right foot ordered due to concern for abscess of the dorsal midfoot and osteomyelitis of the 3rd digit. Will follow up results, and plan for surgical intervention if needed      Thank you for the opportunity to take part in the patient's care.      Weber Bence, DPM  [phone]

## 2020-10-02 NOTE — H&P
Hospital Medicine  History and Physical    PCP: No primary care provider on file. Patient Name: Radhika Randle    Date of Service: Pt seen/examined on 10/01/2020 and admitted to Inpatient with expected LOS greater than two midnights due to medical therapy    CHIEF COMPLAINT:  Pt c/o increased pain, swelling and erythema of his right foot  HISTORY OF PRESENT ILLNESS: Patient is a 50-year-old man with no prior known history of chronic illness. He presents to the emergency room for evaluation of increased pain, swelling and erythema of his right forefoot. He reports having chills at home but does not know if he had a fever as he has not checked his temperature. He reports moderately severe burning pain of the distal aspect of his right foot. the emergency room he was found to have cellulitis of his right foot and associated sepsis. In addition, evaluation revealed previously undiagnosed hypertension and diabetes mellitus. He is being admitted for further evaluation and treatment. Associated signs and symptoms do not include nausea, vomiting, abdominal pain, hemoptysis, hematochezia, diarrhea, constipation or urinary symptoms. Past Medical History:    History reviewed. No pertinent past medical history. Past Surgical History:    History reviewed. No pertinent surgical history. Allergies:  Patient has no known allergies. Medications Prior to Admission:    Prior to Admission medications    Not on File       Family History:       Problem Relation Age of Onset    Diabetes Mother     Diabetes Father     Diabetes Sister      Social History:   TOBACCO:   reports that he has quit smoking. His smoking use included cigarettes. He has a 2.50 pack-year smoking history. He has never used smokeless tobacco.  ETOH:   reports current alcohol use of about 3.0 standard drinks of alcohol per week.   OCCUPATION:      REVIEW OF SYSTEMS:  A full review of systems was performed and is negative except for that which appears in the [facility]    Physical Exam:    Vitals: BP (!) 137/93   Pulse 83   Temp 98.1 °F (36.7 °C) (Oral)   Resp 16   Ht 5' 10\" (1.778 m)   Wt 226 lb 8 oz (102.7 kg)   SpO2 96%   BMI 32.50 kg/m²   General appearance: WD/WN 45y.o. year-old male who is alert, appears stated age and is cooperative  HEENT: Head: Normocephalic, no lesions, without obvious abnormality. Eye: Normal external eye, conjunctiva, lids cornea, PEERL. Ears: Normal external ears. Non-tender. Nose: Normal external nose, mucus membranes and septum. Pharynx: Dental Hygiene adequate. Normal buccal mucosa. Normal pharynx. Neck: no adenopathy, no carotid bruit, no JVD, supple, symmetrical, trachea midline and thyroid not enlarged, symmetric, no tenderness/mass/nodules  Lungs: clear to auscultation bilaterally and no use of accessory muscles. Heart: regular rate and rhythm, S1, S2 normal, no murmur, click, rub or gallop and normal apical impulse  Abdomen: soft, non-tender; bowel sounds normal; no masses, no organomegaly  Extremities: Right foot with deformity of toes, swelling and erythema, TTP and dried blood on his toes. The distal plantar aspect of his foot is very tender. Homans sign is negative, no sign of DVT. Capillary Refill: Acceptable < 3 seconds   Peripheral Pulses: +3 easily felt, not easily obliterated with pressures   Skin: Skin color, texture, turgor normal. No rashes or lesions on exposed skin  Neurologic: Neurovascularly intact without any focal sensory/motor deficits. Cranial nerves: II-XII intact, grossly non-focal. Gait was not tested. Psychiatric: Alert and oriented, thought content appropriate, normal insight    CBC:   Recent Labs     10/01/20  1536   WBC 12.2*   HGB 14.2        BMP:    Recent Labs     10/01/20  1536   *   K 3.5   CL 94*   CO2 28   BUN 11   CREATININE 0.7*   GLUCOSE 277*     Troponin: No results for input(s): TROPONINI in the last 72 hours.   PT/INR:  No results found for: PTINR  U/A:  No results found for: Chantelle Denita, [address] [facility], [facility], [address], [address] [place] [address], [facility]      RAD:   I have independently reviewed and interpreted the imaging studies below and based my recommendations to the patient on those findings. Xr Foot Right (min 3 Views)    Result Date: 10/1/2020  EXAMINATION: 3  XRAY VIEWS OF THE RIGHT FOOT 10/1/2020 3:28 pm COMPARISON: None. HISTORY: ORDERING SYSTEM PROVIDED HISTORY: Cellulitis third toe Reason for Exam: Pt to er with c/o swelling to right leg, states concerned not getting enough circulation to leg, wearing Luisito hose but states swelling started after. also bleeding noted to toes, states happens when his leg swells, Acuity: Chronic Type of Exam: Initial FINDINGS: Normal structures of the right foot appear intact and are aligned normally. No bone erosion or destruction evident. Joint spaces appear well maintained. Soft tissues are prominently edematous over the dorsum of the foot and at the 3rd toe. No soft tissue calcification or gas formation is seen. No retained radiopaque foreign body. No acute osseous abnormality right foot. Soft tissue edema presumably reflecting cellulitis, contusion and/or sprain. Follow-up imaging recommended if pain persists or worsens following conservative management.      Vl Extremity Venous Right    Result Date: 10/1/2020  Vascular Lower Extremities DVT Study Procedure -- PRELIMINARY SONOGRAPHER REPORT --   Demographics   Patient Name      Chandler Old   Date of Study     10/01/2020         Gender              Male   Patient Number    [id number]         Date of Birth       1980   Visit Number      [id number]          Age                 45 year(s)   Accession Number  [id number]         Room Number         0025   Corporate ID      [id number]           [address] [place] [address] Dr Idalia Johnson., Interpreting        Priyank Sellers, Danni Shin MD  Physician         PA                 Physician  Procedure Type of Study:   Veins:Lower Extremities DVT Study, VL EXTREMITY VENOUS DUPLEX RIGHT. Tech Comments Right No evidence of deep vein or superficial vein thrombosis involving the right lower extremity and the left common femoral vein. Assessment:   Principal Problem:    Cellulitis of right foot  Active Problems:    Sepsis (HCC)    Fever    Tachycardia    Neutrophilic leukocytosis    DMII (diabetes mellitus, type 2) (HCC)    Essential hypertension  Resolved Problems:    * No resolved hospital problems. *      Plan:       Cellulitis of right foot - Pt started on Cefepime and Vancomycin. Imaging does not suggest bone involvement. 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 Initial Lactic Acid was elevated. - Blood cultures x 2 have been ordered    Diabetes mellitus II - Glucose level meets diagnostic criteria for DM, though he denies previously being diagnosed with DMII. Check a HgbA1c and start SSI and a carb control diet. The Diabetes Educator will be consulted. Essential (primary) hypertension - previously undiagnosed. Start Losartan and monitor blood pressure        DVT Prophylaxis: Lovenox  Diet: DIET CARB CONTROL;  Code Status: Full Code  (Advanced care planning has been discussed with patient and/or responsible family member and is reflected in the code status.  Further orders associated with this have been entered if appropriate)    Disposition: Anticipate that patient will remain in the hospital for 2 to 3 days depending on further evaluation and clinical course    Please note that over 50 minutes was spent in evaluating the patient, review of records and results, discussion with staff/family, etc.    Carl Muse Cynthia Oliveira MD

## 2020-10-02 NOTE — PROGRESS NOTES
Admission assessment complete, communicated with patient in Polish, see flowsheet. Patient in bed, no s/s of distress, respirations even and unlabored, VSS, swelling and erythema noted to right foot, denies pain. The care plan and education has been reviewed and mutually agreed upon with the patient. All needs attended. Call light within reach. Will continue to monitor.

## 2020-10-02 NOTE — PLAN OF CARE
Problem: Pain:  Goal: Pain level will decrease  Description: Pain level will decrease  Outcome: Ongoing  Goal: Control of acute pain  Description: Control of acute pain  Outcome: Ongoing  Goal: Control of chronic pain  Description: Control of chronic pain  Outcome: Ongoing     Problem: Skin Integrity - Impaired:  Goal: Will show no infection signs and symptoms  Description: Will show no infection signs and symptoms  Outcome: Ongoing  Goal: Absence of new skin breakdown  Description: Absence of new skin breakdown  Outcome: Ongoing     Problem: Metabolic:  Goal: Ability to maintain appropriate glucose levels will improve  Description: Ability to maintain appropriate glucose levels will improve  Outcome: Ongoing     Problem: Nutritional:  Goal: Maintenance of adequate nutrition will improve  Description: Maintenance of adequate nutrition will improve  Outcome: Ongoing     Problem: Cardiac:  Goal: Hemodynamic stability will improve  Description: Hemodynamic stability will improve  Outcome: Ongoing

## 2020-10-02 NOTE — PLAN OF CARE
Problem: Pain:  Goal: Pain level will decrease  Description: Pain level will decrease  10/2/2020 0730 by Aleida Lane RN  Outcome: Ongoing  10/2/2020 0059 by Ary Roy RN  Outcome: Ongoing  Goal: Control of acute pain  Description: Control of acute pain  10/2/2020 0730 by Aleida Lane RN  Outcome: Ongoing  10/2/2020 0059 by Ary Roy RN  Outcome: Ongoing  Goal: Control of chronic pain  Description: Control of chronic pain  10/2/2020 0730 by Aleida Lane RN  Outcome: Ongoing  10/2/2020 0059 by Ary Roy RN  Outcome: Ongoing     Problem: Skin Integrity - Impaired:  Goal: Will show no infection signs and symptoms  Description: Will show no infection signs and symptoms  10/2/2020 0730 by Aleida Lane RN  Outcome: Ongoing  10/2/2020 0059 by Ary Roy RN  Outcome: Ongoing  Goal: Absence of new skin breakdown  Description: Absence of new skin breakdown  10/2/2020 0730 by Aleida Lane RN  Outcome: Ongoing  10/2/2020 0059 by Ary Roy RN  Outcome: Ongoing     Problem: Metabolic:  Goal: Ability to maintain appropriate glucose levels will improve  Description: Ability to maintain appropriate glucose levels will improve  10/2/2020 0730 by Aleida Lane RN  Outcome: Ongoing  10/2/2020 0059 by Ary Roy RN  Outcome: Ongoing     Problem: Nutritional:  Goal: Maintenance of adequate nutrition will improve  Description: Maintenance of adequate nutrition will improve  10/2/2020 0730 by Aleida Lane RN  Outcome: Ongoing  10/2/2020 0059 by Ary Roy RN  Outcome: Ongoing     Problem: Cardiac:  Goal: Hemodynamic stability will improve  Description: Hemodynamic stability will improve  10/2/2020 0730 by Aleida Lane RN  Outcome: Ongoing  10/2/2020 0059 by Ary Roy RN  Outcome: Ongoing

## 2020-10-03 ENCOUNTER — APPOINTMENT (OUTPATIENT)
Dept: MRI IMAGING | Age: 40
DRG: 710 | End: 2020-10-03
Payer: MEDICAID

## 2020-10-03 LAB
ANION GAP SERPL CALCULATED.3IONS-SCNC: 8 MMOL/L (ref 3–16)
BASOPHILS ABSOLUTE: 0.1 K/UL (ref 0–0.2)
BASOPHILS RELATIVE PERCENT: 0.9 %
BUN BLDV-MCNC: 14 MG/DL (ref 7–20)
CALCIUM SERPL-MCNC: 8.4 MG/DL (ref 8.3–10.6)
CHLORIDE BLD-SCNC: 102 MMOL/L (ref 99–110)
CO2: 25 MMOL/L (ref 21–32)
CREAT SERPL-MCNC: 0.8 MG/DL (ref 0.9–1.3)
EOSINOPHILS ABSOLUTE: 0.3 K/UL (ref 0–0.6)
EOSINOPHILS RELATIVE PERCENT: 3.3 %
GFR AFRICAN AMERICAN: >60
GFR NON-AFRICAN AMERICAN: >60
GLUCOSE BLD-MCNC: 120 MG/DL (ref 70–99)
GLUCOSE BLD-MCNC: 188 MG/DL (ref 70–99)
GLUCOSE BLD-MCNC: 206 MG/DL (ref 70–99)
GLUCOSE BLD-MCNC: 242 MG/DL (ref 70–99)
GLUCOSE BLD-MCNC: 244 MG/DL (ref 70–99)
HCT VFR BLD CALC: 38.5 % (ref 40.5–52.5)
HEMOGLOBIN: 13.3 G/DL (ref 13.5–17.5)
LV EF: 43 %
LVEF MODALITY: NORMAL
LYMPHOCYTES ABSOLUTE: 1.3 K/UL (ref 1–5.1)
LYMPHOCYTES RELATIVE PERCENT: 15.8 %
MAGNESIUM: 2 MG/DL (ref 1.8–2.4)
MCH RBC QN AUTO: 30.3 PG (ref 26–34)
MCHC RBC AUTO-ENTMCNC: 34.7 G/DL (ref 31–36)
MCV RBC AUTO: 87.3 FL (ref 80–100)
MONOCYTES ABSOLUTE: 0.9 K/UL (ref 0–1.3)
MONOCYTES RELATIVE PERCENT: 11.5 %
NEUTROPHILS ABSOLUTE: 5.6 K/UL (ref 1.7–7.7)
NEUTROPHILS RELATIVE PERCENT: 68.5 %
PDW BLD-RTO: 13.9 % (ref 12.4–15.4)
PERFORMED ON: ABNORMAL
PLATELET # BLD: 208 K/UL (ref 135–450)
PMV BLD AUTO: 8.4 FL (ref 5–10.5)
POTASSIUM REFLEX MAGNESIUM: 3.4 MMOL/L (ref 3.5–5.1)
RBC # BLD: 4.41 M/UL (ref 4.2–5.9)
SEDIMENTATION RATE, ERYTHROCYTE: 78 MM/HR (ref 0–15)
SODIUM BLD-SCNC: 135 MMOL/L (ref 136–145)
TROPONIN: <0.01 NG/ML
VANCOMYCIN TROUGH: 10.3 UG/ML (ref 10–20)
WBC # BLD: 8.2 K/UL (ref 4–11)

## 2020-10-03 PROCEDURE — 6360000002 HC RX W HCPCS: Performed by: INTERNAL MEDICINE

## 2020-10-03 PROCEDURE — 83735 ASSAY OF MAGNESIUM: CPT

## 2020-10-03 PROCEDURE — 6370000000 HC RX 637 (ALT 250 FOR IP): Performed by: INTERNAL MEDICINE

## 2020-10-03 PROCEDURE — 73720 MRI LWR EXTREMITY W/O&W/DYE: CPT

## 2020-10-03 PROCEDURE — 99254 IP/OBS CNSLTJ NEW/EST MOD 60: CPT | Performed by: INTERNAL MEDICINE

## 2020-10-03 PROCEDURE — 36415 COLL VENOUS BLD VENIPUNCTURE: CPT

## 2020-10-03 PROCEDURE — 80048 BASIC METABOLIC PNL TOTAL CA: CPT

## 2020-10-03 PROCEDURE — 85652 RBC SED RATE AUTOMATED: CPT

## 2020-10-03 PROCEDURE — 93306 TTE W/DOPPLER COMPLETE: CPT

## 2020-10-03 PROCEDURE — 84484 ASSAY OF TROPONIN QUANT: CPT

## 2020-10-03 PROCEDURE — 94760 N-INVAS EAR/PLS OXIMETRY 1: CPT

## 2020-10-03 PROCEDURE — 1200000000 HC SEMI PRIVATE

## 2020-10-03 PROCEDURE — A9577 INJ MULTIHANCE: HCPCS | Performed by: PODIATRIST

## 2020-10-03 PROCEDURE — 2580000003 HC RX 258: Performed by: INTERNAL MEDICINE

## 2020-10-03 PROCEDURE — 6360000004 HC RX CONTRAST MEDICATION: Performed by: PODIATRIST

## 2020-10-03 PROCEDURE — 80202 ASSAY OF VANCOMYCIN: CPT

## 2020-10-03 PROCEDURE — 93005 ELECTROCARDIOGRAM TRACING: CPT | Performed by: INTERNAL MEDICINE

## 2020-10-03 PROCEDURE — 85025 COMPLETE CBC W/AUTO DIFF WBC: CPT

## 2020-10-03 RX ORDER — INSULIN LISPRO 100 [IU]/ML
0-9 INJECTION, SOLUTION INTRAVENOUS; SUBCUTANEOUS NIGHTLY
Status: DISCONTINUED | OUTPATIENT
Start: 2020-10-03 | End: 2020-10-08 | Stop reason: HOSPADM

## 2020-10-03 RX ORDER — LOSARTAN POTASSIUM 25 MG/1
50 TABLET ORAL ONCE
Status: DISCONTINUED | OUTPATIENT
Start: 2020-10-03 | End: 2020-10-03

## 2020-10-03 RX ORDER — LOSARTAN POTASSIUM 100 MG/1
100 TABLET ORAL DAILY
Status: DISCONTINUED | OUTPATIENT
Start: 2020-10-04 | End: 2020-10-08 | Stop reason: HOSPADM

## 2020-10-03 RX ORDER — DEXTROSE MONOHYDRATE 25 G/50ML
12.5 INJECTION, SOLUTION INTRAVENOUS PRN
Status: DISCONTINUED | OUTPATIENT
Start: 2020-10-03 | End: 2020-10-08 | Stop reason: HOSPADM

## 2020-10-03 RX ORDER — NIFEDIPINE 30 MG/1
30 TABLET, EXTENDED RELEASE ORAL DAILY
Status: DISCONTINUED | OUTPATIENT
Start: 2020-10-03 | End: 2020-10-04

## 2020-10-03 RX ORDER — INSULIN LISPRO 100 [IU]/ML
0-18 INJECTION, SOLUTION INTRAVENOUS; SUBCUTANEOUS
Status: DISCONTINUED | OUTPATIENT
Start: 2020-10-03 | End: 2020-10-08 | Stop reason: HOSPADM

## 2020-10-03 RX ORDER — NICOTINE POLACRILEX 4 MG
15 LOZENGE BUCCAL PRN
Status: DISCONTINUED | OUTPATIENT
Start: 2020-10-03 | End: 2020-10-08 | Stop reason: HOSPADM

## 2020-10-03 RX ORDER — LINEZOLID 2 MG/ML
600 INJECTION, SOLUTION INTRAVENOUS EVERY 12 HOURS
Status: DISCONTINUED | OUTPATIENT
Start: 2020-10-03 | End: 2020-10-06

## 2020-10-03 RX ORDER — DEXTROSE MONOHYDRATE 50 MG/ML
100 INJECTION, SOLUTION INTRAVENOUS PRN
Status: DISCONTINUED | OUTPATIENT
Start: 2020-10-03 | End: 2020-10-08 | Stop reason: HOSPADM

## 2020-10-03 RX ORDER — METRONIDAZOLE 250 MG/1
500 TABLET ORAL EVERY 8 HOURS SCHEDULED
Status: DISCONTINUED | OUTPATIENT
Start: 2020-10-03 | End: 2020-10-06

## 2020-10-03 RX ORDER — GLIPIZIDE 5 MG/1
10 TABLET ORAL
Status: DISCONTINUED | OUTPATIENT
Start: 2020-10-04 | End: 2020-10-04

## 2020-10-03 RX ADMIN — Medication 10 ML: at 08:56

## 2020-10-03 RX ADMIN — METFORMIN HYDROCHLORIDE 500 MG: 500 TABLET ORAL at 08:56

## 2020-10-03 RX ADMIN — CEFEPIME HYDROCHLORIDE 2 G: 2 INJECTION, POWDER, FOR SOLUTION INTRAVENOUS at 14:40

## 2020-10-03 RX ADMIN — LINEZOLID 600 MG: 600 INJECTION, SOLUTION INTRAVENOUS at 17:44

## 2020-10-03 RX ADMIN — SODIUM CHLORIDE: 9 INJECTION, SOLUTION INTRAVENOUS at 04:14

## 2020-10-03 RX ADMIN — INSULIN LISPRO 6 UNITS: 100 INJECTION, SOLUTION INTRAVENOUS; SUBCUTANEOUS at 10:53

## 2020-10-03 RX ADMIN — INSULIN LISPRO 6 UNITS: 100 INJECTION, SOLUTION INTRAVENOUS; SUBCUTANEOUS at 08:58

## 2020-10-03 RX ADMIN — LOSARTAN POTASSIUM 50 MG: 25 TABLET, FILM COATED ORAL at 08:56

## 2020-10-03 RX ADMIN — NIFEDIPINE 30 MG: 30 TABLET, FILM COATED, EXTENDED RELEASE ORAL at 17:30

## 2020-10-03 RX ADMIN — Medication 10 ML: at 10:09

## 2020-10-03 RX ADMIN — VANCOMYCIN HYDROCHLORIDE 1500 MG: 10 INJECTION, POWDER, LYOPHILIZED, FOR SOLUTION INTRAVENOUS at 04:14

## 2020-10-03 RX ADMIN — METFORMIN HYDROCHLORIDE 1000 MG: 500 TABLET ORAL at 16:06

## 2020-10-03 RX ADMIN — INSULIN LISPRO 2 UNITS: 100 INJECTION, SOLUTION INTRAVENOUS; SUBCUTANEOUS at 21:41

## 2020-10-03 RX ADMIN — VANCOMYCIN HYDROCHLORIDE 1500 MG: 10 INJECTION, POWDER, LYOPHILIZED, FOR SOLUTION INTRAVENOUS at 16:06

## 2020-10-03 RX ADMIN — GADOBENATE DIMEGLUMINE 20 ML: 529 INJECTION, SOLUTION INTRAVENOUS at 10:09

## 2020-10-03 RX ADMIN — METRONIDAZOLE 500 MG: 250 TABLET ORAL at 21:39

## 2020-10-03 RX ADMIN — ENOXAPARIN SODIUM 40 MG: 40 INJECTION SUBCUTANEOUS at 08:56

## 2020-10-03 RX ADMIN — METOPROLOL TARTRATE 25 MG: 25 TABLET, FILM COATED ORAL at 16:06

## 2020-10-03 RX ADMIN — CEFEPIME HYDROCHLORIDE 2 G: 2 INJECTION, POWDER, FOR SOLUTION INTRAVENOUS at 02:49

## 2020-10-03 RX ADMIN — ACETAMINOPHEN 650 MG: 325 TABLET ORAL at 08:58

## 2020-10-03 RX ADMIN — Medication 10 ML: at 21:39

## 2020-10-03 ASSESSMENT — PAIN DESCRIPTION - PAIN TYPE: TYPE: ACUTE PAIN

## 2020-10-03 ASSESSMENT — PAIN DESCRIPTION - LOCATION: LOCATION: HEAD

## 2020-10-03 ASSESSMENT — PAIN SCALES - GENERAL
PAINLEVEL_OUTOF10: 0
PAINLEVEL_OUTOF10: 3

## 2020-10-03 ASSESSMENT — PAIN DESCRIPTION - DESCRIPTORS: DESCRIPTORS: ACHING

## 2020-10-03 NOTE — PROGRESS NOTES
Podiatric Surgery Daily Progress Note  Seven Olivera  Subjective :   Patient seen and examined this am at the bedside. Patient's niece is at bedside, and patient requests that the translation is done through her instead of the language line. Patient denies any acute overnight events. Patient denies N/V/F/C/SOB. Patient denies calf pain, thigh pain, chest pain. Review of Systems: A 12 point review of symptoms is unremarkable with the exception of the chief complaint. Patient specifically denies nausea, fever, vomiting, chills, shortness of breath, chest pain, abdominal pain, constipation or difficulty urinating. Objective     BP (!) 169/112   Pulse 91   Temp 98.8 °F (37.1 °C) (Oral)   Resp 16   Ht 5' 10\" (1.778 m)   Wt 226 lb 8 oz (102.7 kg)   SpO2 96%   BMI 32.50 kg/m²      I/O:No intake or output data in the 24 hours ending 10/03/20 1326           Wt Readings from Last 3 Encounters:   10/01/20 226 lb 8 oz (102.7 kg)       LABS:    Recent Labs     10/02/20  0525 10/03/20  0252   WBC 9.7 8.2   HGB 13.2* 13.3*   HCT 39.4* 38.5*    208        Recent Labs     10/03/20  0252   *   K 3.4*      CO2 25   BUN 14   CREATININE 0.8*        Recent Labs     10/01/20  1536   PROT 7.3           LOWER EXTREMITY EXAMINATION  VASCULAR: DP and PT pulses are palpable 2/4 b/l. CFT is brisk to the digits of the foot b/l. Skin temperature is warm to cool from proximal to distal with focal calor noted to the right foot. Moderate edema noted to the right foot. No pain with calf compression b/l.     NEUROLOGIC: Gross and epicritic sensation is diminished b/l.     DERMATOLOGIC: Full-thickness ulceration noted to the dorsal aspect of the 3rd PIPJ of the b/l foot with significant hyperkeratotic rim. Hyperkeratosis noted to the 2nd and 4th PIPJ's as well. Erythema noted to the dorsal aspect of the right midfoot, with mild fluctuance underlying.  Area is painful to palpation.      MUSCULOSKELETAL: Muscle strength is 5/5 for all pedal groups tested. Severe rigid hammertoe deformities noted to digits 2-5 b/l. Moderate HAV deformity noted b/l.            MRI Right Foot 10/3/20  Impression    Cellulitis most prominent involving the 3rd digit where there is a soft    tissue ulceration along the dorsal aspect extending to the PIP joint.  Early    erosive changes at the PIP joint and findings of osteomyelitis involving the    middle and proximal phalanx of the 3rd digit.  No defined fluid collection.          IMPRESSION/RECOMMENDATIONS:    1. Cellulitis, right foot - concern for underlying abscess  2. Full-thickness ulceration with osteomyelitis, right 3rd digit  3. Full-thickness ulceration, left 3rd digit  4. Hammertoe deformities, digits 2-5 b/l  5. Diabetes mellitus with peripheral neuropathy - new onset, hemoglobin A1c of 11.7%     - Patient was seen and examined at bedside this afternoon.  - Labs reviewed: leukcytosis on admission has resolved  - ESR pending  - MRI from this morning reviewed and positive for osteomyelitis of the middle and proximal phalanx of the 3rd digit but no drainable abscess to the dorsal foot. Discussed this finding in detail with the patient and patient's niece. - Agree with IV antibiotics  - Lengthy discussion was had with the patient that due to the presence of confirmed osteomyelitis, the patient would benefit from amputation of the 3rd digit, for definitive treatment of the bone infection. Patient agreed. - Due to scheduling, procedure will likely be scheduled for Monday. Will plan NPO and consent orders tomorrow.     DISPO: MRI with evidence of osteomyelitis of the right 3rd digit.  Plan for right 3rd digit amputation on Monday    Samy Ayon DPM  [phone]

## 2020-10-03 NOTE — PROGRESS NOTES
Morning assessment complete. Language line used to educate patient that his insulin has increased due to his elevated blood sugars and that transport was placed to get patient for MRI of R foot. Pt verbalized understanding. Has a headache, PRN tylenol administered for this. Denies pain in RLE, pedal pulses palpable. Redness/swelling remains the same.  ID # [id number]. Independent in the room, low fall risk. Pt demonstrated how to reach nurse with call light. Call light in reach. Will continue to monitor. The care plan and education has been reviewed and mutually agreed upon with the patient.        1043 echo called for it to be completed today    1525 consult to financial placed, social work aware    [id number] respiratory aware of EKG order    1624 Message sent to Dr. Susie Foster to review EKG, cards consult pending    [phone] tele placed on patient     [phone] blood pressure sent to Dr. Kelley Hanks, pt asymptomatic     1719 manual blood pressure is 170/120, additional message sent to jim.

## 2020-10-03 NOTE — PROGRESS NOTES
[facility] PROGRESS NOTE    10/3/2020 10:22 AM        Name: Brendon Cannon Admitted: 10/1/2020  Primary Care Provider: No primary care provider on file. (Tel: None)    Brief Course: Patient is a 41-year-old man with no prior known history of chronic illness. He presents to the emergency room for evaluation of increased pain, swelling and erythema of his right forefoot. In addition, evaluation revealed previously undiagnosed hypertension and diabetes mellitus. He was admitted under hospitalist service for further evaluation and treatment. Podiatry was involved due to concern for abscess and osteomyelitis. MRI on 10/3 showed right foot osteomyelitis. ID service was consulted. 2D echocardiogram done on 10/3 showed severe left ventricular hypertrophy with global hypokinesis. Cardiology was consulted since septum showed infiltrative process.      CC: pain, swelling and erythema of his right foot  Subjective: Patient reports pain in right foot improved since yesterday. BPs remain mildly elevated overnight. Patient's niece was in the room. She understands and speaks [place]. Explained the current management and plan. Patient wanted to know about discharge plan.   Reported that he might be here at least for 2-3 more days.     Reviewed interval ancillary notes    Current Medications  insulin lispro (1 Unit Dial) 0-18 Units, TID WC  insulin lispro (1 Unit Dial) 0-9 Units, Nightly  metFORMIN (GLUCOPHAGE) tablet 1,000 mg, BID WC  perflutren lipid microspheres (DEFINITY) injection 1.65 mg, ONCE PRN  sodium chloride flush 0.9 % injection 10 mL, 2 times per day  sodium chloride flush 0.9 % injection 10 mL, PRN  cefepime (MAXIPIME) 2 g IVPB minibag, Q12H  sodium chloride flush 0.9 % injection 10 mL, 2 times per day  sodium chloride flush 0.9 % injection 10 mL, PRN  acetaminophen (TYLENOL) tablet 650 mg, Q4H PRN 28 26 25   BUN 11 11 14   CREATININE 0.7* 0.6* 0.8*   GLUCOSE 277* 264* 206*     Hepatic:   Recent Labs     10/01/20  1536   AST 12*   ALT 15   BILITOT 0.7   ALKPHOS 158*     VL Extremity Venous Right   Final Result      XR FOOT RIGHT (MIN 3 VIEWS)   Final Result   No acute osseous abnormality right foot. Soft tissue edema presumably reflecting cellulitis, contusion and/or sprain. Follow-up imaging recommended if pain persists or worsens following   conservative management. MRI FOOT RIGHT W WO CONTRAST    (Results Pending)       Problem List  Principal Problem:    Cellulitis of right foot  Active Problems:    Sepsis ([address]    Fever    Tachycardia    Neutrophilic leukocytosis    DMII (diabetes mellitus, type 2) (HCC)    Essential hypertension  Resolved Problems:    * No resolved hospital problems. *       Assessment & Plan:     Right foot cellulitis, osteomyelitis:   Patient was started on IV vancomycin and cefepime on admission. Not enough drainage to send sample for wound C/S. Due to concern for abscess, requested for podiatry consult with recommendations on further imaging. Patient had MRI foot done today which showed osteomyelitis on the right foot. Requested for ID consultation. As per podiatry, patient will undergo toe amputation on Monday.     Chronic undiagnosed, untreated type-2 DM, complicated by neuropathy and foot osteomyelitis:   Hemoglobin A1c of 11.7. Started on Metformin 500 mg po bid. blood sugars are elevated. Increase the dose of metformin to 1 g p.o. twice daily. Blood sugars remain elevated. Added glipizide 10 mg p.o. daily. POC blood sugars and SSI to cover. Due to financial issues and lack of medical insurance, trying oral agents. If unable to achieve adequate control, will need to initiate insulin therapy. Working with social work and financial counselor on arranging for meds on discharge.     Consulted diabetes educator and dietitian.     Undiagnosed, untreated HTN:   Patient was started on losartan 50 mg p.o. daily. BP readings remain mildly elevated with heart rate in 90s. Will add low-dose metoprolol p.o. twice daily. Losartan dose increased 200 mg p.o. daily starting tomorrow. Newly diagnosed cardiomyopathy:   2D echocardiogram was done today to evaluate cardiac function given his history of chronic on diagnosed and untreated hypertension. 2D echo showed severe left ventricular hypertrophy with global hypokinesis, LVEF of 40 to 45%. Septal infiltrative process reported. Twelve-lead EKG ordered. Cardiology consulted to help with need for further evaluation and management.     IV Access: Peripheral IV  Guerin: No  Diet: DIET CARB CONTROL; Carb Control: 3 carb choices (45 gms)/meal  Code:Full Code  DVT PPX Lovenox subcu daily  Disposition pending to amputation on Monday, ID and cardiology service recommendations.       Ricardo Dent MD   10/3/2020 10:22 AM

## 2020-10-03 NOTE — CONSULTS
Infectious Diseases Inpatient Consult Note      Reason for Consult:  Complicated diabetic foot infection and Toe osteomyelitis     Requesting Physician:  Sonali Wharton     Primary Care Physician:  No primary care provider on file. History Obtained From:  Epic and Patient     CHIEF COMPLAINT:     Chief Complaint   Patient presents with    Leg Swelling     Pt to er with c/o swelling to right leg, states concerned not getting enough circulation to leg, wearing eva hose but states swelling started after. also bleeding noted to toes, states happens when his leg swells, denies injury. no hx of CHF, sometimes swelling in left too, denies today       RT  leg swelling and local redness     HISTORY OF PRESENT ILLNESS:  45 y.o. Man with no prior history admitted with rt  foot swelling and local redness spreading up the leg and deformed and swollen Rt  3rd toe from months. He is not sure of the fever as he did not check at home and here T max at 100.4 and BG elevated with HbA1C at 11.7 and new diagnosis of DM. WBC elevation on admit and MRI of RT FOOT with ongoing thought to osteomyelitis with possible septic joint. Location : Ongoing right foot pain       Quality : Aching     Severity :   6 out of 10  Duration : 3 to 4 weeks      Timing : Constant  Context :   Context of diabetic foot infection  Modifying factors : Better with elevation and pain medication associated signs and symptoms: Foot  swelling, redness, drainage, or deformity of the toes. Past Medical History:    History reviewed. No pertinent past medical history. Past Surgical History:    History reviewed. No pertinent surgical history. Current Medications:    No outpatient medications have been marked as taking for the 10/1/20 encounter [facility] Encounter). Allergies:  Patient has no known allergies.     Immunizations :   Immunization History   Administered Date(s) Administered    Influenza, Quadv, IM, PF (6 mo and older Fluzone, Flulaval, Fluarix, and 3 yrs and older Afluria) 10/02/2020         Social History:     Social History     Tobacco Use    Smoking status: Former Smoker     Packs/day: 0.25     Years: 10.00     Pack years: 2.50     Types: Cigarettes    Smokeless tobacco: Never Used   Substance Use Topics    Alcohol use: Yes     Alcohol/week: 3.0 standard drinks     Types: 3 Cans of beer per week    Drug use: Never     Social History     Tobacco Use   Smoking Status Former Smoker    Packs/day: 0.25    Years: 10.00    Pack years: 2.50    Types: Cigarettes   Smokeless Tobacco Never Used      Family History   Problem Relation Age of Onset    Diabetes Mother     Diabetes Father     Diabetes Sister          REVIEW OF SYSTEMS:    Constitutional: +  fevers, chills, +  sweats  Eyes:  negative for blurred vision, eye discharge, visual disturbance   HEENT:  negative for hearing loss, ear drainage,nasal congestion  Respiratory:  negative for cough, shortness of breath or hemoptysis   Cardiovascular:  negative for chest pain, palpitations, syncope  Gastrointestinal:  negative for nausea, vomiting, diarrhea, constipation, abdominal pain  Genitourinary:  negative for frequency, dysuria, urinary incontinence, hematuria  Hematologic/Lymphatic:  negative for easy bruising, bleeding and lymphadenopathy  Allergic/Immunologic:  negative for recurrent infections, angioedema, anaphylaxis   Endocrine:  negative for weight changes, + polyuria, + polydipsia and polyphagia  Musculoskeletal:  Rt foot swelling and 3rd toe joint  Pain+  Swelling+ , decreased range of motion+   Integumentary: No rashes, skin lesions  Neurological:  negative for headaches, slurred speech, unilateral weakness  Psychiatric: negative for hallucinations,confusion,agitation.      PHYSICAL EXAM:      Vitals:  T max 100.4   BP (!) 151/106   Pulse 91   Temp 98.8 °F (37.1 °C) (Oral)   Resp 18   Ht 5' 10\" (1.778 m)   Wt 226 lb 8 oz (102.7 kg)   SpO2 96%   BMI 32.50 kg/m² General Appearance: alert,in no acute distress, no pallor, no icterus obesity +   Skin: warm and dry, no rash or erythema  Head: normocephalic and atraumatic  Eyes: pupils equal, round, and reactive to light, conjunctivae normal  ENT: tympanic membrane, external ear and ear canal normal bilaterally, nose without deformity, nasal mucosa and turbinates normal without polyps  Neck: supple and non-tender without mass, no thyromegaly  no cervical lymphadenopathy  Pulmonary/Chest: clear to auscultation bilaterally- no wheezes, rales or rhonchi, normal air movement, no respiratory distress  Cardiovascular: normal rate, regular rhythm, normal S1 and S2, no murmurs, rubs, clicks, or gallops, no carotid bruits  Abdomen: soft, non-tender, non-distended, normal bowel sounds, no masses or organomegaly  Extremities: no cyanosis, clubbing or edema  Musculoskeletal: normal range of motion, no joint swelling, deformity or tenderness  Integumentary: No rashes, no abnormal skin lesions, no petechiae  Neurologic: reflexes normal and symmetric, no cranial nerve deficit  Psych:  Orientation, sensorium, mood normal   Lines: IV  Rt foot swelling with on going cellulitis and Rt 3rd toe deformity with on going swelling and scab and hammer toe changes   DATA:    CBC:   Lab Results   Component Value Date    WBC 8.2 10/03/2020    HGB 13.3 (L) 10/03/2020    HCT 38.5 (L) 10/03/2020    MCV 87.3 10/03/2020     10/03/2020     RENAL:   Lab Results   Component Value Date    CREATININE 0.8 (L) 10/03/2020    BUN 14 10/03/2020     (L) 10/03/2020    K 3.4 (L) 10/03/2020     10/03/2020    CO2 25 10/03/2020     SED RATE:   Lab Results   Component Value Date    SEDRATE 78 10/03/2020     CK: No results found for: CKTOTAL  CRP: No results found for: CRP  Hepatic Function Panel:   Lab Results   Component Value Date    ALKPHOS 158 10/01/2020    ALT 15 10/01/2020    AST 12 10/01/2020    PROT 7.3 10/01/2020    BILITOT 0.7 10/01/2020 LABALBU 2.8 10/01/2020     UA:No results found for: Justine Lesches, SPECGRAV, BLOODU, PHUR, PROTEINU, UROBILINOGEN, NITRU, LEUKOCYTESUR, LABMICR, URINETYPE   Urine Microscopic: No results found for: LABCAST, BACTERIA, COMU, HYALCAST, WBCUA, RBCUA, EPIU  Urine Reflex to Culture: No results found for: URRFLXCULT      MICRO: cultures reviewed and updated by me   Procedure  Component  Value  Units  Date/Time    Culture, Blood 1 [[id number]]   Collected: 10/01/20 1610    Order Status: Completed  Specimen: Blood  Updated: 10/02/20 1615     Blood Culture, Routine  No Growth to date.  Any change in status will be called. Narrative:      ORDER#: [id number]                          ORDERED BY: Rayshawn Landeros   SOURCE: Blood right AC  Antecubital-Rig    COLLECTED:  10/01/20 16:10   ANTIBIOTICS AT NADYA. :                      RECEIVED :  10/01/20 20:13   If child <=2 yrs old please draw pediatric bottle. ~Blood Culture #1   Performed at:   [facility]   [address] [place], [place] [address]   Phone [phone]    Culture, Blood 2 [[id number]]   Collected: 10/01/20 1611    Order Status: Completed  Specimen: Blood  Updated: 10/02/20 1615     Culture, Blood 2  No Growth to date.  Any change in status will be called. Narrative:      ORDER#: [id number]                          ORDERED BY: Rayshawn Landeros   SOURCE: Blood Antecubital-Lef              COLLECTED:  10/01/20 16:11   ANTIBIOTICS AT NADYA. :                      RECEIVED :  10/01/20 20:13   If child <=2 yrs old please draw pediatric bottle. ~Blood Culture #2   Performed at:   [facility]   [address] [place], [place] [address]   Phone [phone]    Culture, Wound [[id number]]      Order Status: Sent  Specimen: Foot, Right         Blood Culture:   Lab Results   Component Value Date    [facility]  10/01/2020     No Growth to date. Any change in status will be called. BLOODCULT2  10/01/2020     No Growth to date. Any change in status will be called. Viral Culture:    No results found for: COVID19  Urine Culture: No results for input(s): LABURIN in the last 72 hours. Scheduled Meds:   insulin lispro  0-18 Units Subcutaneous TID WC    insulin lispro  0-9 Units Subcutaneous Nightly    metFORMIN  1,000 mg Oral BID WC    [START ON 10/4/2020] glipiZIDE  10 mg Oral QAM AC    [START ON 10/4/2020] losartan  100 mg Oral Daily    metoprolol tartrate  25 mg Oral BID    sodium chloride flush  10 mL Intravenous 2 times per day    cefepime  2 g Intravenous Q12H    sodium chloride flush  10 mL Intravenous 2 times per day    enoxaparin  40 mg Subcutaneous Daily    vancomycin  1,500 mg Intravenous Q12H       Continuous Infusions:   dextrose      sodium chloride 75 mL/hr at 10/03/20 0414       PRN Meds:  perflutren lipid microspheres, glucose, dextrose, glucagon (rDNA), dextrose, sodium chloride flush, sodium chloride flush, acetaminophen **OR** acetaminophen, polyethylene glycol, ondansetron    Imaging:   MRI FOOT RIGHT W WO CONTRAST   Final Result   Cellulitis most prominent involving the 3rd digit where there is a soft   tissue ulceration along the dorsal aspect extending to the PIP joint. Early   erosive changes at the PIP joint and findings of osteomyelitis involving the   middle and proximal phalanx of the 3rd digit. No defined fluid collection. VL Extremity Venous Right   Final Result      XR FOOT RIGHT (MIN 3 VIEWS)   Final Result   No acute osseous abnormality right foot. Soft tissue edema presumably reflecting cellulitis, contusion and/or sprain. Follow-up imaging recommended if pain persists or worsens following   conservative management. All pertinent images and reports for the current Hospitalization were reviewed by me.     IMPRESSION:    Patient Active Problem List   Diagnosis    Cellulitis of right foot    Sepsis ([facility] [address]    Fever    Tachycardia    Neutrophilic leukocytosis    DMII (diabetes mellitus, type 2) (HCC)    Essential hypertension     Complicated Rt diabetic foot infection  Rt foot cellulitis  Rt 3rd toe osteomyelitis  Fevers  WBC elevation   NEW diagnosis of DM on this admit  HbA1c at  11.7  Strong family h/o DM +  Obesity +  MRI Rt foot very abnormal   Hammer toes both feet likely from diabetic Neuropathy    He has on going severe Rt foot infection with cellulitis and osteomyelitis and will likely need surgery for Rt 3RD TOE and   Suspect mixed infectious process       Labs, Microbiology, Radiology and pertinent results from current hospitalization and care every where were reviewed by me as a part of the consultation. PLAN :  1. Cont IV Cefepime x 2 gm Q 12 HRS  2. D/C IV Vancomycin due to risk for CELIA as Poor DM control   3. IV Linezolid x 600 mg x Q 12 HRS  4. Add Oral Flagyl 500 mg Q 8 hrs for anaerobes  5. May need surgery Podiatry following   6. ESR, CRP   7. Needs to loose wt   8. Diabetes education and counseling and control d/w pt     Discussed with patient/Family and Nursing   Risk of Complications/Morbidity: High      · Illness(es)/ Infection present that pose threat to bodily function. · There is potential for severe exacerbation of infection/side effects of treatment. · Therapy requires intensive monitoring for antimicrobial agent toxicity. Thanks for allowing me to participate in your patient's care please call me with any questions or concerns.     Dr. Gasper Davis MD  [address] [place] [address] Physician  Phone: [phone]   Fax : [phone]

## 2020-10-03 NOTE — PROGRESS NOTES
2128: Report received from [facility], Pt is alert and oriented, ambulating in room, denies any needs at this time. Will monitor pt. The care plan and education has been reviewed and mutually agreed upon with the patient. 1066: Pt reports right  pain, ice pack applied, will monitor pt.

## 2020-10-04 LAB
ANION GAP SERPL CALCULATED.3IONS-SCNC: 8 MMOL/L (ref 3–16)
BASOPHILS ABSOLUTE: 0 K/UL (ref 0–0.2)
BASOPHILS RELATIVE PERCENT: 0.7 %
BILIRUBIN URINE: NEGATIVE
BLOOD, URINE: ABNORMAL
BUN BLDV-MCNC: 10 MG/DL (ref 7–20)
CALCIUM SERPL-MCNC: 8.4 MG/DL (ref 8.3–10.6)
CHLORIDE BLD-SCNC: 101 MMOL/L (ref 99–110)
CLARITY: CLEAR
CO2: 27 MMOL/L (ref 21–32)
COLOR: YELLOW
CREAT SERPL-MCNC: 0.8 MG/DL (ref 0.9–1.3)
EKG ATRIAL RATE: 91 BPM
EKG DIAGNOSIS: NORMAL
EKG P AXIS: 46 DEGREES
EKG P-R INTERVAL: 158 MS
EKG Q-T INTERVAL: 380 MS
EKG QRS DURATION: 104 MS
EKG QTC CALCULATION (BAZETT): 467 MS
EKG R AXIS: -72 DEGREES
EKG T AXIS: 49 DEGREES
EKG VENTRICULAR RATE: 91 BPM
EOSINOPHILS ABSOLUTE: 0.2 K/UL (ref 0–0.6)
EOSINOPHILS RELATIVE PERCENT: 3.5 %
EPITHELIAL CELLS, UA: 1 /HPF (ref 0–5)
GFR AFRICAN AMERICAN: >60
GFR NON-AFRICAN AMERICAN: >60
GLUCOSE BLD-MCNC: 149 MG/DL (ref 70–99)
GLUCOSE BLD-MCNC: 161 MG/DL (ref 70–99)
GLUCOSE BLD-MCNC: 173 MG/DL (ref 70–99)
GLUCOSE BLD-MCNC: 193 MG/DL (ref 70–99)
GLUCOSE BLD-MCNC: 92 MG/DL (ref 70–99)
GLUCOSE URINE: 100 MG/DL
HCT VFR BLD CALC: 39.6 % (ref 40.5–52.5)
HEMOGLOBIN: 13.3 G/DL (ref 13.5–17.5)
HYALINE CASTS: 3 /LPF (ref 0–8)
KETONES, URINE: 15 MG/DL
LEUKOCYTE ESTERASE, URINE: NEGATIVE
LYMPHOCYTES ABSOLUTE: 1.1 K/UL (ref 1–5.1)
LYMPHOCYTES RELATIVE PERCENT: 16.1 %
MAGNESIUM: 2 MG/DL (ref 1.8–2.4)
MCH RBC QN AUTO: 29.5 PG (ref 26–34)
MCHC RBC AUTO-ENTMCNC: 33.6 G/DL (ref 31–36)
MCV RBC AUTO: 87.6 FL (ref 80–100)
MICROSCOPIC EXAMINATION: YES
MONOCYTES ABSOLUTE: 0.7 K/UL (ref 0–1.3)
MONOCYTES RELATIVE PERCENT: 9.7 %
NEUTROPHILS ABSOLUTE: 4.9 K/UL (ref 1.7–7.7)
NEUTROPHILS RELATIVE PERCENT: 70 %
NITRITE, URINE: NEGATIVE
PDW BLD-RTO: 13.9 % (ref 12.4–15.4)
PERFORMED ON: ABNORMAL
PERFORMED ON: NORMAL
PH UA: 6 (ref 5–8)
PLATELET # BLD: 237 K/UL (ref 135–450)
PMV BLD AUTO: 8.1 FL (ref 5–10.5)
POTASSIUM REFLEX MAGNESIUM: 3.5 MMOL/L (ref 3.5–5.1)
PROTEIN UA: >=300 MG/DL
RBC # BLD: 4.52 M/UL (ref 4.2–5.9)
RBC UA: 8 /HPF (ref 0–4)
SODIUM BLD-SCNC: 136 MMOL/L (ref 136–145)
SPECIFIC GRAVITY UA: 1.02 (ref 1–1.03)
URINE REFLEX TO CULTURE: ABNORMAL
URINE TYPE: ABNORMAL
UROBILINOGEN, URINE: 0.2 E.U./DL
WBC # BLD: 6.9 K/UL (ref 4–11)
WBC UA: 3 /HPF (ref 0–5)

## 2020-10-04 PROCEDURE — 94760 N-INVAS EAR/PLS OXIMETRY 1: CPT

## 2020-10-04 PROCEDURE — 83735 ASSAY OF MAGNESIUM: CPT

## 2020-10-04 PROCEDURE — 85025 COMPLETE CBC W/AUTO DIFF WBC: CPT

## 2020-10-04 PROCEDURE — 1200000000 HC SEMI PRIVATE

## 2020-10-04 PROCEDURE — 93010 ELECTROCARDIOGRAM REPORT: CPT | Performed by: INTERNAL MEDICINE

## 2020-10-04 PROCEDURE — 6360000002 HC RX W HCPCS: Performed by: INTERNAL MEDICINE

## 2020-10-04 PROCEDURE — 99254 IP/OBS CNSLTJ NEW/EST MOD 60: CPT | Performed by: INTERNAL MEDICINE

## 2020-10-04 PROCEDURE — 2580000003 HC RX 258: Performed by: INTERNAL MEDICINE

## 2020-10-04 PROCEDURE — 81001 URINALYSIS AUTO W/SCOPE: CPT

## 2020-10-04 PROCEDURE — 6370000000 HC RX 637 (ALT 250 FOR IP): Performed by: INTERNAL MEDICINE

## 2020-10-04 PROCEDURE — 80048 BASIC METABOLIC PNL TOTAL CA: CPT

## 2020-10-04 RX ORDER — GLIPIZIDE 5 MG/1
15 TABLET ORAL
Status: DISCONTINUED | OUTPATIENT
Start: 2020-10-05 | End: 2020-10-08 | Stop reason: HOSPADM

## 2020-10-04 RX ORDER — NIFEDIPINE 30 MG/1
30 TABLET, EXTENDED RELEASE ORAL ONCE
Status: COMPLETED | OUTPATIENT
Start: 2020-10-04 | End: 2020-10-04

## 2020-10-04 RX ORDER — LABETALOL HYDROCHLORIDE 5 MG/ML
10 INJECTION, SOLUTION INTRAVENOUS EVERY 4 HOURS PRN
Status: DISCONTINUED | OUTPATIENT
Start: 2020-10-04 | End: 2020-10-08 | Stop reason: HOSPADM

## 2020-10-04 RX ORDER — METOPROLOL TARTRATE 50 MG/1
50 TABLET, FILM COATED ORAL 2 TIMES DAILY
Status: DISCONTINUED | OUTPATIENT
Start: 2020-10-04 | End: 2020-10-07

## 2020-10-04 RX ORDER — NIFEDIPINE 30 MG/1
60 TABLET, EXTENDED RELEASE ORAL DAILY
Status: DISCONTINUED | OUTPATIENT
Start: 2020-10-05 | End: 2020-10-08 | Stop reason: HOSPADM

## 2020-10-04 RX ORDER — NIFEDIPINE 30 MG/1
30 TABLET, EXTENDED RELEASE ORAL DAILY
Status: DISCONTINUED | OUTPATIENT
Start: 2020-10-04 | End: 2020-10-04

## 2020-10-04 RX ADMIN — METRONIDAZOLE 500 MG: 250 TABLET ORAL at 12:34

## 2020-10-04 RX ADMIN — INSULIN LISPRO 3 UNITS: 100 INJECTION, SOLUTION INTRAVENOUS; SUBCUTANEOUS at 08:38

## 2020-10-04 RX ADMIN — INSULIN LISPRO 2 UNITS: 100 INJECTION, SOLUTION INTRAVENOUS; SUBCUTANEOUS at 21:50

## 2020-10-04 RX ADMIN — LOSARTAN POTASSIUM 100 MG: 100 TABLET, FILM COATED ORAL at 08:38

## 2020-10-04 RX ADMIN — LINEZOLID 600 MG: 600 INJECTION, SOLUTION INTRAVENOUS at 05:52

## 2020-10-04 RX ADMIN — METRONIDAZOLE 500 MG: 250 TABLET ORAL at 21:51

## 2020-10-04 RX ADMIN — METRONIDAZOLE 500 MG: 250 TABLET ORAL at 05:51

## 2020-10-04 RX ADMIN — METFORMIN HYDROCHLORIDE 1000 MG: 500 TABLET ORAL at 08:38

## 2020-10-04 RX ADMIN — METFORMIN HYDROCHLORIDE 1000 MG: 500 TABLET ORAL at 17:09

## 2020-10-04 RX ADMIN — NIFEDIPINE 30 MG: 30 TABLET, FILM COATED, EXTENDED RELEASE ORAL at 08:38

## 2020-10-04 RX ADMIN — METOPROLOL TARTRATE 25 MG: 25 TABLET, FILM COATED ORAL at 08:38

## 2020-10-04 RX ADMIN — CEFEPIME HYDROCHLORIDE 2 G: 2 INJECTION, POWDER, FOR SOLUTION INTRAVENOUS at 03:16

## 2020-10-04 RX ADMIN — CEFEPIME HYDROCHLORIDE 2 G: 2 INJECTION, POWDER, FOR SOLUTION INTRAVENOUS at 14:20

## 2020-10-04 RX ADMIN — LINEZOLID 600 MG: 600 INJECTION, SOLUTION INTRAVENOUS at 18:18

## 2020-10-04 RX ADMIN — METOPROLOL TARTRATE 50 MG: 50 TABLET, FILM COATED ORAL at 21:51

## 2020-10-04 RX ADMIN — INSULIN LISPRO 3 UNITS: 100 INJECTION, SOLUTION INTRAVENOUS; SUBCUTANEOUS at 12:34

## 2020-10-04 RX ADMIN — ENOXAPARIN SODIUM 40 MG: 40 INJECTION SUBCUTANEOUS at 08:38

## 2020-10-04 RX ADMIN — NIFEDIPINE 30 MG: 30 TABLET, FILM COATED, EXTENDED RELEASE ORAL at 17:09

## 2020-10-04 RX ADMIN — ACETAMINOPHEN 650 MG: 325 TABLET ORAL at 18:14

## 2020-10-04 RX ADMIN — GLIPIZIDE 10 MG: 5 TABLET ORAL at 05:51

## 2020-10-04 ASSESSMENT — PAIN SCALES - GENERAL
PAINLEVEL_OUTOF10: 3
PAINLEVEL_OUTOF10: 0

## 2020-10-04 NOTE — CARE COORDINATION
SW attempted DCPA, other provider with pt at time. ,SW to follow.     Ling Curtis MSW, [address]

## 2020-10-04 NOTE — CARE COORDINATION
Financial consult requested by GANESH.  Pt is self pay. Need to determine if pt is Medicaid eligible for post acute care service needs.     Danielle Godinez MSW, [address]

## 2020-10-04 NOTE — PROGRESS NOTES
Shift assessment completed, pt is alert and oriented, VSS, independent in room. Call light within reach, denies any needs at this time. See flowsheets and MAR. Will monitor pt. The care plan and education has been reviewed and mutually agreed upon with the patient.

## 2020-10-04 NOTE — PROGRESS NOTES
[facility] PROGRESS NOTE    10/4/2020 3:05 PM        Name: Stevie Giang . Admitted: 10/1/2020  Primary Care Provider: No primary care provider on file. (Tel: None)    Brief Course: Patient is a 52-year-old man with no prior known history of chronic illness. He presents to the emergency room for evaluation of increased pain, swelling and erythema of his right forefoot. In addition, evaluation revealed previously undiagnosed hypertension and diabetes mellitus. He was admitted under hospitalist service for further evaluation and treatment. Podiatry was involved due to concern for abscess and osteomyelitis. MRI on 10/3 showed right foot osteomyelitis. ID service was consulted. 2D echocardiogram done on 10/3 showed severe left ventricular hypertrophy, LVEF of 40 to 45%, global hypokinesis and septum with infiltrative process. Cardiology was consulted on 10/3. He is scheduled to undergo SHEBA likely on 10/5. Patient is scheduled to undergo toe amputation by podaitry on 10/4.      CC: pain, swelling and erythema of his right foot  Subjective: Patient reports improving pain in his right foot. BPs remain mildly elevated as well. Blood sugars are better controlled but not at target. Patient is constantly fixated on when he could be discharged home.     Reviewed interval ancillary notes    Current Medications  [START ON 10/5/2020] glipiZIDE (GLUCOTROL) tablet 15 mg, QAM AC  metoprolol tartrate (LOPRESSOR) tablet 50 mg, BID  insulin lispro (1 Unit Dial) 0-18 Units, TID WC  insulin lispro (1 Unit Dial) 0-9 Units, Nightly  metFORMIN (GLUCOPHAGE) tablet 1,000 mg, BID WC  perflutren lipid microspheres (DEFINITY) injection 1.65 mg, ONCE PRN  glucose (GLUTOSE) 40 % oral gel 15 g, PRN  dextrose 50 % IV solution, PRN  glucagon (rDNA) injection 1 mg, PRN  dextrose 5 % solution, PRN  losartan (COZAAR) tablet 100 mg, Daily  linezolid (ZYVOX) IVPB 600 mg, Q12H  metroNIDAZOLE (FLAGYL) tablet 500 mg, 3 times per day  sodium chloride flush 0.9 % injection 10 mL, 2 times per day  sodium chloride flush 0.9 % injection 10 mL, PRN  cefepime (MAXIPIME) 2 g IVPB minibag, Q12H  sodium chloride flush 0.9 % injection 10 mL, 2 times per day  sodium chloride flush 0.9 % injection 10 mL, PRN  acetaminophen (TYLENOL) tablet 650 mg, Q4H PRN    Or  acetaminophen (TYLENOL) suppository 650 mg, Q4H PRN  polyethylene glycol (GLYCOLAX) packet 17 g, Daily PRN  ondansetron (ZOFRAN) injection 4 mg, Q4H PRN  enoxaparin (LOVENOX) injection 40 mg, Daily        Objective:  BP (!) 143/94   Pulse 94   Temp 98.5 °F (36.9 °C) (Oral)   Resp 16   Ht 5' 10\" (1.778 m)   Wt 226 lb 8 oz (102.7 kg)   SpO2 95%   BMI 32.50 kg/m²     Intake/Output Summary (Last 24 hours) at 10/4/2020 1505  Last data filed at 10/4/2020 0448  Gross per 24 hour   Intake --   Output 350 ml   Net -350 ml      Wt Readings from Last 3 Encounters:   10/01/20 226 lb 8 oz (102.7 kg)     General appearance:Overweight  male,  Appears comfortable  ENT: Moist oral mucosa. Trachea midline, no adenopathy. Cardiovascular: Regular rhythm, normal S1, S2. No murmur. No edema in lower extremities  Respiratory: Not using accessory muscles. Good inspiratory effort. Clear to auscultation bilaterally, no wheeze or crackles. GI: Abdomen soft, no tenderness, not distended, normal bowel sounds  Musculoskeletal: No cyanosis in digits, neck supple  Psych: Normal affect.  Alert and oriented in time, place and person  Skin: Warm, dry, normal turgor, Right foot with deformity of toes, swelling and erythema, distal plantar aspect of his foot is very tender.   Extremity exam shows brisk capillary refill.  Peripheral pulses are palpable in lower extremities            Labs and Tests:  CBC:   Recent Labs     10/02/20  0525 10/03/20  0252 10/04/20  0529   WBC 9.7 8.2 6.9   HGB 13.2* 13.3* 13.3*    208 237     BMP: Recent Labs     10/02/20  0525 10/03/20  0252 10/04/20  0529    135* 136   K 3.5 3.4* 3.5    102 101   CO2 26 25 27   BUN 11 14 10   CREATININE 0.6* 0.8* 0.8*   GLUCOSE 264* 206* 173*     Hepatic:   Recent Labs     10/01/20  1536   AST 12*   ALT 15   BILITOT 0.7   ALKPHOS 158*     MRI FOOT RIGHT W WO CONTRAST   Final Result   Cellulitis most prominent involving the 3rd digit where there is a soft   tissue ulceration along the dorsal aspect extending to the PIP joint. Early   erosive changes at the PIP joint and findings of osteomyelitis involving the   middle and proximal phalanx of the 3rd digit. No defined fluid collection. VL Extremity Venous Right   Final Result      XR FOOT RIGHT (MIN 3 VIEWS)   Final Result   No acute osseous abnormality right foot. Soft tissue edema presumably reflecting cellulitis, contusion and/or sprain. Follow-up imaging recommended if pain persists or worsens following   conservative management. Problem List  Principal Problem:    Cellulitis of right foot  Active Problems:    Sepsis ([address]    Fever    Tachycardia    Neutrophilic leukocytosis    DMII (diabetes mellitus, type 2) ([facility])    Essential hypertension  Resolved Problems:    * No resolved hospital problems. *       Assessment & Plan:     Right foot osteomyelitis:   Patient was started on IV vancomycin and cefepime on admission. Podiatry was consulted. Patient underwent MRI foot. ID was consulted after MRI foot showed osteomyelitis. Patient is currently on IV cefepime, IV linezolid and IV Flagyl. Patient is scheduled to undergo toe amputation on Monday by podiatry service. Will need to follow-up with ID recommendations on antibiotic regimen.     Chronic undiagnosed, untreated type-2 DM, complicated by neuropathy and foot osteomyelitis:   Hemoglobin A1c of 11.7. Patient does not have medical insurance. Unable to afford medications on his own.   Currently on metformin 1 g p.o. twice daily and glipizide 10 mg p.o. daily. POC blood sugars and SSI to cover. If social issues can be resolved, and if unable to achieve adequate control of blood sugars, will need to initiate insulin therapy. Consulted diabetes educator and dietitian.     Undiagnosed, untreated HTN:   Patient was initiated on losartan, metoprolol, and Procardia. Currently on losartan 100 mg p.o. daily and metoprolol 25 mg p.o. twice daily. BPs continue to trend high. Increase the dose of metoprolol to 50 mg p.o. twice daily. Given additional dose of Procardia 30 mg p.o. x1 and increase the dose of Procardia XL to 60 mg p.o. daily. EKG and 2D echo suggestive of left ventricular hypertrophy from chronic untreated hypertension.     LVH, cardiomyopathy:   2D echocardiogram was done on 10/3 to evaluate cardiac function showed severe left ventricular hypertrophy with global hypokinesis, LVEF of 40 to 45%. Possible septal infiltrative process reported. Cardiology consulted. Patient is scheduled to undergo SHEBA to rule out endocarditis. Recommend cardiac MRI as outpatient to further evaluate the infiltrative process.     IV Access: Peripheral IV  Guerin: No   Diet: DIET CARB CONTROL; Carb Control: 3 carb choices (45 gms)/meal  Diet NPO, After Midnight  Code:Full Code  DVT PPX Lovenox subcu daily  Disposition pending toe amputation on Monday, SHEBA as per cardiology service, final ID recommendations, social work consult since patient lacks health insurance, unable to afford medications on his own.     Dominique Hadley MD   10/4/2020 3:05 PM

## 2020-10-04 NOTE — PROGRESS NOTES
Podiatric Surgery Daily Progress Note  [facility]  Subjective :   Patient seen and examined this am at the bedside. Language line was utilized during today's encounter. Patient expresses concern that he will end up losing the remainder of his leg because of this infection, as he has had family members that have lost legs due to diabetes. Patient denies any acute overnight events. Patient denies N/V/F/C/SOB. Patient denies calf pain, thigh pain, chest pain. Review of Systems: A 12 point review of symptoms is unremarkable with the exception of the chief complaint. Patient specifically denies nausea, fever, vomiting, chills, shortness of breath, chest pain, abdominal pain, constipation or difficulty urinating. Objective     /88   Pulse 90   Temp 98.3 °F (36.8 °C) (Oral)   Resp 16   Ht 5' 10\" (1.778 m)   Wt 226 lb 8 oz (102.7 kg)   SpO2 94%   BMI 32.50 kg/m²      I/O:    Intake/Output Summary (Last 24 hours) at 10/4/2020 1150  Last data filed at 10/4/2020 0448  Gross per 24 hour   Intake --   Output 350 ml   Net -350 ml              Wt Readings from Last 3 Encounters:   10/01/20 226 lb 8 oz (102.7 kg)       LABS:    Recent Labs     10/03/20  0252 10/04/20  0529   WBC 8.2 6.9   HGB 13.3* 13.3*   HCT 38.5* 39.6*    237        Recent Labs     10/04/20  0529      K 3.5      CO2 27   BUN 10   CREATININE 0.8*        Recent Labs     10/01/20  1536   PROT 7.3           LOWER EXTREMITY EXAMINATION  VASCULAR: DP and PT pulses are palpable 2/4 b/l. CFT is brisk to the digits of the foot b/l. Skin temperature is warm to cool from proximal to distal with focal calor noted to the right foot. Moderate edema noted to the right foot. No pain with calf compression b/l.     NEUROLOGIC: Gross and epicritic sensation is diminished b/l.     DERMATOLOGIC: Full-thickness ulceration noted to the dorsal aspect of the 3rd PIPJ of the b/l foot with significant hyperkeratotic rim.  Hyperkeratosis noted to the 2nd and 4th PIPJ's as well. Erythema noted to the dorsal aspect of the right midfoot, with mild fluctuance underlying. Area is painful to palpation.      MUSCULOSKELETAL: Muscle strength is 5/5 for all pedal groups tested. Severe rigid hammertoe deformities noted to digits 2-5 b/l. Moderate HAV deformity noted b/l.            MRI Right Foot 10/3/20  Impression    Cellulitis most prominent involving the 3rd digit where there is a soft    tissue ulceration along the dorsal aspect extending to the PIP joint.  Early    erosive changes at the PIP joint and findings of osteomyelitis involving the    middle and proximal phalanx of the 3rd digit.  No defined fluid collection.          IMPRESSION/RECOMMENDATIONS:    1. Cellulitis, right foot  2. Full-thickness ulceration with osteomyelitis, right 3rd digit  3. Full-thickness ulceration, left 3rd digit  4. Hammertoe deformities, digits 2-5 b/l  5. Diabetes mellitus with peripheral neuropathy - new onset, hemoglobin A1c of 11.7%     - Patient was seen and examined at bedside this afternoon.  - Labs reviewed: leukcytosis on admission has resolved  - ESR pending  - MRI reviewed and positive for osteomyelitis of the middle and proximal phalanx of the 3rd digit but no drainable abscess to the dorsal foot. Discussed this finding in detail with the patient   - Agree with IV antibiotics  - Lengthy discussion was had with the patient that due to the presence of confirmed osteomyelitis, the patient would benefit from amputation of the 3rd digit, for definitive treatment of the bone infection. Patient agreed. - NPO and consent ordered     DISPO: MRI with evidence of osteomyelitis of the right 3rd digit.  Plan for right 3rd digit amputation on Monday at 2:30pm    Sherri Duran DPM  [phone]

## 2020-10-04 NOTE — CONSULTS
[address]  Inpatient Consult/H+P  Interventional Cardiology/Structural Heart Disease    REASON FOR CONSULT/CHIEF COMPLAINT/HPI     RFC/CC Abnormal echo   HPI Vladimir Melgoza is a 45 y.o.admitted 10/1 with osteomyelitis of foot. Echo performed revealing depressed EF of 40-45% and severe LVH with possible septal infiltrative process. No hx of heart disease, no family hx heart disease. Hx smoking. Did not known was diabetic. BP elevated on admit. HISTORY/ALLERGIES/ROS     MedHx:  has no past medical history on file. SurgHx:  has no past surgical history on file. SocHx:  reports that he has quit smoking. His smoking use included cigarettes. He has a 2.50 pack-year smoking history. He has never used smokeless tobacco. He reports current alcohol use of about 3.0 standard drinks of alcohol per week. He reports that he does not use drugs. [facility] [[facility]]  Allerg: Patient has no known allergies.    ROS:  [x]Full ROS obtained and negative except as mentioned in HPI    MEDICATIONS      Prior to Admission medications    Not on File       PHYSICAL EXAM        Vitals:    10/04/20 0830   BP: (!) 155/100   Pulse: 92   Resp: 16   Temp: 98.3 °F (36.8 °C)   SpO2: 94%    Weight: 226 lb 8 oz (102.7 kg)     Gen Alert, coop, no distress Heart  Rrr, 1/6   Head NC, AT, no abnorm Abd  Soft, NT, +BS, no mass, no OM   Eyes PERRLA, conj/corn clear Ext  Ext nl, AT, no C/C, foot swelling bilat, erythema noted, joints deformed   Nose Nares nl, no drain, NT Pulse decr   Throat Lips, mucosa, tongue nl Skin Color/text/turg nl, no rash/lesions   Neck S/S, TM, NT, no bruit/JVD Psych Nl mood and affect   Lung CTA-B, unlabored, no DTP Lymph   No cervical or axillary LA   Ch wall NT, no deform Neuro  Nl gross M/S exam     LABS     Relevant and available CV data reviewed    ASSESSMENT AND PLAN     *Cardiomyopathy  Status New, incidental finding on echo, may be due to uncontrolled HTN  Plan Needs ischemic workup after infection controlled   SHEBA tomorrow to evaluate septum and for possible endocarditis   MRI in future may be helpful for possible infiltrative process  *Osteo  Per med/surgery  *DM  Status New diagnosis  Plan Per med  *HTN  Status New diagnosis, likely longstanding  Plan Titrate antihypertensives to systolic <568   Would add ACEI with DM and CM, agree with BB

## 2020-10-05 ENCOUNTER — ANESTHESIA EVENT (OUTPATIENT)
Dept: OPERATING ROOM | Age: 40
DRG: 710 | End: 2020-10-05
Payer: MEDICAID

## 2020-10-05 ENCOUNTER — ANESTHESIA (OUTPATIENT)
Dept: OPERATING ROOM | Age: 40
DRG: 710 | End: 2020-10-05
Payer: MEDICAID

## 2020-10-05 ENCOUNTER — APPOINTMENT (OUTPATIENT)
Dept: CARDIAC CATH/INVASIVE PROCEDURES | Age: 40
DRG: 710 | End: 2020-10-05
Payer: MEDICAID

## 2020-10-05 ENCOUNTER — ANESTHESIA (OUTPATIENT)
Dept: CARDIAC CATH/INVASIVE PROCEDURES | Age: 40
DRG: 710 | End: 2020-10-05
Payer: MEDICAID

## 2020-10-05 ENCOUNTER — ANESTHESIA EVENT (OUTPATIENT)
Dept: CARDIAC CATH/INVASIVE PROCEDURES | Age: 40
DRG: 710 | End: 2020-10-05
Payer: MEDICAID

## 2020-10-05 ENCOUNTER — APPOINTMENT (OUTPATIENT)
Dept: GENERAL RADIOLOGY | Age: 40
DRG: 710 | End: 2020-10-05
Payer: MEDICAID

## 2020-10-05 VITALS — DIASTOLIC BLOOD PRESSURE: 93 MMHG | OXYGEN SATURATION: 100 % | SYSTOLIC BLOOD PRESSURE: 141 MMHG

## 2020-10-05 VITALS
OXYGEN SATURATION: 99 % | DIASTOLIC BLOOD PRESSURE: 83 MMHG | SYSTOLIC BLOOD PRESSURE: 132 MMHG | RESPIRATION RATE: 12 BRPM

## 2020-10-05 PROBLEM — E11.65 POORLY CONTROLLED TYPE 2 DIABETES MELLITUS (HCC): Status: ACTIVE | Noted: 2020-10-01

## 2020-10-05 LAB
ANION GAP SERPL CALCULATED.3IONS-SCNC: 9 MMOL/L (ref 3–16)
BASOPHILS ABSOLUTE: 0 K/UL (ref 0–0.2)
BASOPHILS RELATIVE PERCENT: 0.5 %
BLOOD CULTURE, ROUTINE: NORMAL
BUN BLDV-MCNC: 7 MG/DL (ref 7–20)
CALCIUM SERPL-MCNC: 8.6 MG/DL (ref 8.3–10.6)
CHLORIDE BLD-SCNC: 98 MMOL/L (ref 99–110)
CO2: 28 MMOL/L (ref 21–32)
CREAT SERPL-MCNC: 0.7 MG/DL (ref 0.9–1.3)
CULTURE, BLOOD 2: NORMAL
EOSINOPHILS ABSOLUTE: 0.1 K/UL (ref 0–0.6)
EOSINOPHILS RELATIVE PERCENT: 2 %
GFR AFRICAN AMERICAN: >60
GFR NON-AFRICAN AMERICAN: >60
GLUCOSE BLD-MCNC: 130 MG/DL (ref 70–99)
GLUCOSE BLD-MCNC: 136 MG/DL (ref 70–99)
GLUCOSE BLD-MCNC: 149 MG/DL (ref 70–99)
GLUCOSE BLD-MCNC: 160 MG/DL (ref 70–99)
GLUCOSE BLD-MCNC: 179 MG/DL (ref 70–99)
GLUCOSE BLD-MCNC: 250 MG/DL (ref 70–99)
HCT VFR BLD CALC: 41.8 % (ref 40.5–52.5)
HEMOGLOBIN: 14 G/DL (ref 13.5–17.5)
LV EF: 40 %
LVEF MODALITY: NORMAL
LYMPHOCYTES ABSOLUTE: 1.1 K/UL (ref 1–5.1)
LYMPHOCYTES RELATIVE PERCENT: 16.1 %
MAGNESIUM: 1.9 MG/DL (ref 1.8–2.4)
MCH RBC QN AUTO: 28.9 PG (ref 26–34)
MCHC RBC AUTO-ENTMCNC: 33.4 G/DL (ref 31–36)
MCV RBC AUTO: 86.6 FL (ref 80–100)
MONOCYTES ABSOLUTE: 0.7 K/UL (ref 0–1.3)
MONOCYTES RELATIVE PERCENT: 9.6 %
NEUTROPHILS ABSOLUTE: 5 K/UL (ref 1.7–7.7)
NEUTROPHILS RELATIVE PERCENT: 71.8 %
PDW BLD-RTO: 13.8 % (ref 12.4–15.4)
PERFORMED ON: ABNORMAL
PLATELET # BLD: 263 K/UL (ref 135–450)
PMV BLD AUTO: 8.2 FL (ref 5–10.5)
POTASSIUM REFLEX MAGNESIUM: 3.4 MMOL/L (ref 3.5–5.1)
RBC # BLD: 4.83 M/UL (ref 4.2–5.9)
SODIUM BLD-SCNC: 135 MMOL/L (ref 136–145)
WBC # BLD: 7 K/UL (ref 4–11)

## 2020-10-05 PROCEDURE — 88311 DECALCIFY TISSUE: CPT

## 2020-10-05 PROCEDURE — 87077 CULTURE AEROBIC IDENTIFY: CPT

## 2020-10-05 PROCEDURE — 2580000003 HC RX 258: Performed by: NURSE ANESTHETIST, CERTIFIED REGISTERED

## 2020-10-05 PROCEDURE — 6370000000 HC RX 637 (ALT 250 FOR IP): Performed by: INTERNAL MEDICINE

## 2020-10-05 PROCEDURE — 73630 X-RAY EXAM OF FOOT: CPT

## 2020-10-05 PROCEDURE — 93325 DOPPLER ECHO COLOR FLOW MAPG: CPT

## 2020-10-05 PROCEDURE — 87205 SMEAR GRAM STAIN: CPT

## 2020-10-05 PROCEDURE — 6360000002 HC RX W HCPCS: Performed by: INTERNAL MEDICINE

## 2020-10-05 PROCEDURE — 3700000000 HC ANESTHESIA ATTENDED CARE: Performed by: PODIATRIST

## 2020-10-05 PROCEDURE — 6370000000 HC RX 637 (ALT 250 FOR IP): Performed by: PODIATRIST

## 2020-10-05 PROCEDURE — 1200000000 HC SEMI PRIVATE

## 2020-10-05 PROCEDURE — 87070 CULTURE OTHR SPECIMN AEROBIC: CPT

## 2020-10-05 PROCEDURE — 85025 COMPLETE CBC W/AUTO DIFF WBC: CPT

## 2020-10-05 PROCEDURE — 87116 MYCOBACTERIA CULTURE: CPT

## 2020-10-05 PROCEDURE — 92960 CARDIOVERSION ELECTRIC EXT: CPT

## 2020-10-05 PROCEDURE — 2500000003 HC RX 250 WO HCPCS: Performed by: PODIATRIST

## 2020-10-05 PROCEDURE — 6360000002 HC RX W HCPCS: Performed by: NURSE ANESTHETIST, CERTIFIED REGISTERED

## 2020-10-05 PROCEDURE — 2580000003 HC RX 258: Performed by: INTERNAL MEDICINE

## 2020-10-05 PROCEDURE — 3700000000 HC ANESTHESIA ATTENDED CARE

## 2020-10-05 PROCEDURE — 99153 MOD SED SAME PHYS/QHP EA: CPT

## 2020-10-05 PROCEDURE — 87015 SPECIMEN INFECT AGNT CONCNTJ: CPT

## 2020-10-05 PROCEDURE — 0Y6T0Z0 DETACHMENT AT RIGHT 3RD TOE, COMPLETE, OPEN APPROACH: ICD-10-PCS | Performed by: PODIATRIST

## 2020-10-05 PROCEDURE — 2709999900 HC NON-CHARGEABLE SUPPLY: Performed by: PODIATRIST

## 2020-10-05 PROCEDURE — 2500000003 HC RX 250 WO HCPCS: Performed by: NURSE ANESTHETIST, CERTIFIED REGISTERED

## 2020-10-05 PROCEDURE — 2500000003 HC RX 250 WO HCPCS: Performed by: ANESTHESIOLOGY

## 2020-10-05 PROCEDURE — 99233 SBSQ HOSP IP/OBS HIGH 50: CPT | Performed by: INTERNAL MEDICINE

## 2020-10-05 PROCEDURE — 83735 ASSAY OF MAGNESIUM: CPT

## 2020-10-05 PROCEDURE — 88307 TISSUE EXAM BY PATHOLOGIST: CPT

## 2020-10-05 PROCEDURE — 87206 SMEAR FLUORESCENT/ACID STAI: CPT

## 2020-10-05 PROCEDURE — 7100000001 HC PACU RECOVERY - ADDTL 15 MIN: Performed by: PODIATRIST

## 2020-10-05 PROCEDURE — 80048 BASIC METABOLIC PNL TOTAL CA: CPT

## 2020-10-05 PROCEDURE — 93312 ECHO TRANSESOPHAGEAL: CPT

## 2020-10-05 PROCEDURE — 93320 DOPPLER ECHO COMPLETE: CPT

## 2020-10-05 PROCEDURE — B24BZZ4 ULTRASONOGRAPHY OF HEART WITH AORTA, TRANSESOPHAGEAL: ICD-10-PCS | Performed by: INTERNAL MEDICINE

## 2020-10-05 PROCEDURE — 2580000003 HC RX 258: Performed by: PODIATRIST

## 2020-10-05 PROCEDURE — 6360000002 HC RX W HCPCS: Performed by: PODIATRIST

## 2020-10-05 PROCEDURE — 3700000001 HC ADD 15 MINUTES (ANESTHESIA): Performed by: PODIATRIST

## 2020-10-05 PROCEDURE — 99152 MOD SED SAME PHYS/QHP 5/>YRS: CPT

## 2020-10-05 PROCEDURE — 7100000000 HC PACU RECOVERY - FIRST 15 MIN: Performed by: PODIATRIST

## 2020-10-05 PROCEDURE — 3600000013 HC SURGERY LEVEL 3 ADDTL 15MIN: Performed by: PODIATRIST

## 2020-10-05 PROCEDURE — 99223 1ST HOSP IP/OBS HIGH 75: CPT | Performed by: INTERNAL MEDICINE

## 2020-10-05 PROCEDURE — 36415 COLL VENOUS BLD VENIPUNCTURE: CPT

## 2020-10-05 PROCEDURE — 87075 CULTR BACTERIA EXCEPT BLOOD: CPT

## 2020-10-05 PROCEDURE — 88305 TISSUE EXAM BY PATHOLOGIST: CPT

## 2020-10-05 PROCEDURE — 3600000003 HC SURGERY LEVEL 3 BASE: Performed by: PODIATRIST

## 2020-10-05 PROCEDURE — 87102 FUNGUS ISOLATION CULTURE: CPT

## 2020-10-05 RX ORDER — PROPOFOL 10 MG/ML
INJECTION, EMULSION INTRAVENOUS PRN
Status: DISCONTINUED | OUTPATIENT
Start: 2020-10-05 | End: 2020-10-05 | Stop reason: SDUPTHER

## 2020-10-05 RX ORDER — HYDROCHLOROTHIAZIDE 25 MG/1
25 TABLET ORAL DAILY
Status: DISCONTINUED | OUTPATIENT
Start: 2020-10-06 | End: 2020-10-08 | Stop reason: HOSPADM

## 2020-10-05 RX ORDER — ONDANSETRON 2 MG/ML
INJECTION INTRAMUSCULAR; INTRAVENOUS PRN
Status: DISCONTINUED | OUTPATIENT
Start: 2020-10-05 | End: 2020-10-05 | Stop reason: SDUPTHER

## 2020-10-05 RX ORDER — SODIUM CHLORIDE 9 MG/ML
INJECTION, SOLUTION INTRAVENOUS CONTINUOUS PRN
Status: DISCONTINUED | OUTPATIENT
Start: 2020-10-05 | End: 2020-10-05 | Stop reason: SDUPTHER

## 2020-10-05 RX ORDER — LIDOCAINE HYDROCHLORIDE 20 MG/ML
INJECTION, SOLUTION INFILTRATION; PERINEURAL PRN
Status: DISCONTINUED | OUTPATIENT
Start: 2020-10-05 | End: 2020-10-05 | Stop reason: SDUPTHER

## 2020-10-05 RX ORDER — ONDANSETRON 2 MG/ML
4 INJECTION INTRAMUSCULAR; INTRAVENOUS
Status: DISCONTINUED | OUTPATIENT
Start: 2020-10-05 | End: 2020-10-05 | Stop reason: HOSPADM

## 2020-10-05 RX ORDER — LIDOCAINE HYDROCHLORIDE 10 MG/ML
INJECTION, SOLUTION EPIDURAL; INFILTRATION; INTRACAUDAL; PERINEURAL
Status: COMPLETED | OUTPATIENT
Start: 2020-10-05 | End: 2020-10-05

## 2020-10-05 RX ORDER — POTASSIUM CHLORIDE 7.45 MG/ML
10 INJECTION INTRAVENOUS PRN
Status: DISCONTINUED | OUTPATIENT
Start: 2020-10-05 | End: 2020-10-08 | Stop reason: HOSPADM

## 2020-10-05 RX ORDER — MAGNESIUM HYDROXIDE 1200 MG/15ML
LIQUID ORAL CONTINUOUS PRN
Status: COMPLETED | OUTPATIENT
Start: 2020-10-05 | End: 2020-10-05

## 2020-10-05 RX ORDER — OXYCODONE HYDROCHLORIDE AND ACETAMINOPHEN 5; 325 MG/1; MG/1
1 TABLET ORAL
Status: DISCONTINUED | OUTPATIENT
Start: 2020-10-05 | End: 2020-10-05 | Stop reason: HOSPADM

## 2020-10-05 RX ORDER — BUPIVACAINE HYDROCHLORIDE 5 MG/ML
INJECTION, SOLUTION EPIDURAL; INTRACAUDAL
Status: COMPLETED | OUTPATIENT
Start: 2020-10-05 | End: 2020-10-05

## 2020-10-05 RX ORDER — HYDRALAZINE HYDROCHLORIDE 20 MG/ML
5 INJECTION INTRAMUSCULAR; INTRAVENOUS EVERY 10 MIN PRN
Status: DISCONTINUED | OUTPATIENT
Start: 2020-10-05 | End: 2020-10-05 | Stop reason: HOSPADM

## 2020-10-05 RX ORDER — MEPERIDINE HYDROCHLORIDE 25 MG/ML
12.5 INJECTION INTRAMUSCULAR; INTRAVENOUS; SUBCUTANEOUS EVERY 5 MIN PRN
Status: DISCONTINUED | OUTPATIENT
Start: 2020-10-05 | End: 2020-10-05 | Stop reason: HOSPADM

## 2020-10-05 RX ORDER — POTASSIUM CHLORIDE 20 MEQ/1
40 TABLET, EXTENDED RELEASE ORAL PRN
Status: DISCONTINUED | OUTPATIENT
Start: 2020-10-05 | End: 2020-10-08 | Stop reason: HOSPADM

## 2020-10-05 RX ORDER — GLYCOPYRROLATE 0.2 MG/ML
INJECTION INTRAMUSCULAR; INTRAVENOUS PRN
Status: DISCONTINUED | OUTPATIENT
Start: 2020-10-05 | End: 2020-10-05 | Stop reason: SDUPTHER

## 2020-10-05 RX ORDER — MIDAZOLAM HYDROCHLORIDE 1 MG/ML
INJECTION INTRAMUSCULAR; INTRAVENOUS PRN
Status: DISCONTINUED | OUTPATIENT
Start: 2020-10-05 | End: 2020-10-05 | Stop reason: SDUPTHER

## 2020-10-05 RX ORDER — KETAMINE HYDROCHLORIDE 10 MG/ML
INJECTION, SOLUTION INTRAMUSCULAR; INTRAVENOUS PRN
Status: DISCONTINUED | OUTPATIENT
Start: 2020-10-05 | End: 2020-10-05 | Stop reason: SDUPTHER

## 2020-10-05 RX ORDER — HYDROMORPHONE HCL 110MG/55ML
0.5 PATIENT CONTROLLED ANALGESIA SYRINGE INTRAVENOUS EVERY 5 MIN PRN
Status: DISCONTINUED | OUTPATIENT
Start: 2020-10-05 | End: 2020-10-05 | Stop reason: HOSPADM

## 2020-10-05 RX ORDER — DEXAMETHASONE SODIUM PHOSPHATE 4 MG/ML
INJECTION, SOLUTION INTRA-ARTICULAR; INTRALESIONAL; INTRAMUSCULAR; INTRAVENOUS; SOFT TISSUE PRN
Status: DISCONTINUED | OUTPATIENT
Start: 2020-10-05 | End: 2020-10-05 | Stop reason: SDUPTHER

## 2020-10-05 RX ORDER — PROPOFOL 10 MG/ML
INJECTION, EMULSION INTRAVENOUS CONTINUOUS PRN
Status: DISCONTINUED | OUTPATIENT
Start: 2020-10-05 | End: 2020-10-05 | Stop reason: SDUPTHER

## 2020-10-05 RX ORDER — LIDOCAINE HYDROCHLORIDE 20 MG/ML
INJECTION, SOLUTION EPIDURAL; INFILTRATION; INTRACAUDAL; PERINEURAL PRN
Status: DISCONTINUED | OUTPATIENT
Start: 2020-10-05 | End: 2020-10-05 | Stop reason: SDUPTHER

## 2020-10-05 RX ORDER — LABETALOL HYDROCHLORIDE 5 MG/ML
5 INJECTION, SOLUTION INTRAVENOUS EVERY 10 MIN PRN
Status: DISCONTINUED | OUTPATIENT
Start: 2020-10-05 | End: 2020-10-05 | Stop reason: HOSPADM

## 2020-10-05 RX ORDER — FENTANYL CITRATE 50 UG/ML
INJECTION, SOLUTION INTRAMUSCULAR; INTRAVENOUS PRN
Status: DISCONTINUED | OUTPATIENT
Start: 2020-10-05 | End: 2020-10-05 | Stop reason: SDUPTHER

## 2020-10-05 RX ADMIN — PHENYLEPHRINE HYDROCHLORIDE 50 MCG: 10 INJECTION INTRAVENOUS at 15:00

## 2020-10-05 RX ADMIN — LINEZOLID 600 MG: 600 INJECTION, SOLUTION INTRAVENOUS at 17:58

## 2020-10-05 RX ADMIN — CEFEPIME HYDROCHLORIDE 2 G: 2 INJECTION, POWDER, FOR SOLUTION INTRAVENOUS at 14:47

## 2020-10-05 RX ADMIN — PROPOFOL 50 MG: 10 INJECTION, EMULSION INTRAVENOUS at 09:40

## 2020-10-05 RX ADMIN — PROPOFOL 50 MG: 10 INJECTION, EMULSION INTRAVENOUS at 14:57

## 2020-10-05 RX ADMIN — PHENYLEPHRINE HYDROCHLORIDE 100 MCG: 10 INJECTION INTRAVENOUS at 15:15

## 2020-10-05 RX ADMIN — FENTANYL CITRATE 50 MCG: 50 INJECTION, SOLUTION INTRAMUSCULAR; INTRAVENOUS at 14:57

## 2020-10-05 RX ADMIN — SODIUM CHLORIDE: 9 INJECTION, SOLUTION INTRAVENOUS at 09:38

## 2020-10-05 RX ADMIN — INSULIN LISPRO 5 UNITS: 100 INJECTION, SOLUTION INTRAVENOUS; SUBCUTANEOUS at 21:55

## 2020-10-05 RX ADMIN — PHENYLEPHRINE HYDROCHLORIDE 100 MCG: 10 INJECTION INTRAVENOUS at 15:10

## 2020-10-05 RX ADMIN — LIDOCAINE HYDROCHLORIDE 100 MG: 20 INJECTION, SOLUTION INFILTRATION; PERINEURAL at 14:56

## 2020-10-05 RX ADMIN — MIDAZOLAM 2 MG: 1 INJECTION INTRAMUSCULAR; INTRAVENOUS at 14:49

## 2020-10-05 RX ADMIN — LABETALOL HYDROCHLORIDE 5 MG: 5 INJECTION INTRAVENOUS at 16:07

## 2020-10-05 RX ADMIN — LINEZOLID 600 MG: 600 INJECTION, SOLUTION INTRAVENOUS at 05:54

## 2020-10-05 RX ADMIN — KETAMINE HYDROCHLORIDE 30 MG: 10 INJECTION, SOLUTION INTRAMUSCULAR; INTRAVENOUS at 14:57

## 2020-10-05 RX ADMIN — SODIUM CHLORIDE: 9 INJECTION, SOLUTION INTRAVENOUS at 14:00

## 2020-10-05 RX ADMIN — GLYCOPYRROLATE 0.2 MG: 0.2 INJECTION INTRAMUSCULAR; INTRAVENOUS at 14:54

## 2020-10-05 RX ADMIN — PROPOFOL 120 MCG/KG/MIN: 10 INJECTION, EMULSION INTRAVENOUS at 14:57

## 2020-10-05 RX ADMIN — PROPOFOL 20 MG: 10 INJECTION, EMULSION INTRAVENOUS at 15:03

## 2020-10-05 RX ADMIN — PROPOFOL 30 MG: 10 INJECTION, EMULSION INTRAVENOUS at 15:00

## 2020-10-05 RX ADMIN — CEFEPIME HYDROCHLORIDE 2 G: 2 INJECTION, POWDER, FOR SOLUTION INTRAVENOUS at 03:35

## 2020-10-05 RX ADMIN — LIDOCAINE HYDROCHLORIDE 100 MG: 20 INJECTION, SOLUTION EPIDURAL; INFILTRATION; INTRACAUDAL; PERINEURAL at 09:40

## 2020-10-05 RX ADMIN — METOPROLOL TARTRATE 50 MG: 50 TABLET, FILM COATED ORAL at 13:22

## 2020-10-05 RX ADMIN — PROPOFOL 20 MG: 10 INJECTION, EMULSION INTRAVENOUS at 09:43

## 2020-10-05 RX ADMIN — GLYCOPYRROLATE 0.2 MG: 0.2 INJECTION INTRAMUSCULAR; INTRAVENOUS at 15:00

## 2020-10-05 RX ADMIN — PHENYLEPHRINE HYDROCHLORIDE 100 MCG: 10 INJECTION INTRAVENOUS at 15:29

## 2020-10-05 RX ADMIN — PHENYLEPHRINE HYDROCHLORIDE 100 MCG: 10 INJECTION INTRAVENOUS at 15:42

## 2020-10-05 RX ADMIN — FENTANYL CITRATE 25 MCG: 50 INJECTION, SOLUTION INTRAMUSCULAR; INTRAVENOUS at 15:10

## 2020-10-05 RX ADMIN — FENTANYL CITRATE 25 MCG: 50 INJECTION, SOLUTION INTRAMUSCULAR; INTRAVENOUS at 15:06

## 2020-10-05 RX ADMIN — METRONIDAZOLE 500 MG: 250 TABLET ORAL at 21:54

## 2020-10-05 RX ADMIN — PHENYLEPHRINE HYDROCHLORIDE 50 MCG: 10 INJECTION INTRAVENOUS at 15:05

## 2020-10-05 RX ADMIN — ONDANSETRON 4 MG: 2 INJECTION INTRAMUSCULAR; INTRAVENOUS at 15:02

## 2020-10-05 RX ADMIN — METFORMIN HYDROCHLORIDE 1000 MG: 500 TABLET ORAL at 17:58

## 2020-10-05 RX ADMIN — PHENYLEPHRINE HYDROCHLORIDE 100 MCG: 10 INJECTION INTRAVENOUS at 15:22

## 2020-10-05 RX ADMIN — METRONIDAZOLE 500 MG: 250 TABLET ORAL at 05:54

## 2020-10-05 RX ADMIN — DEXAMETHASONE SODIUM PHOSPHATE 8 MG: 4 INJECTION, SOLUTION INTRAMUSCULAR; INTRAVENOUS at 14:59

## 2020-10-05 RX ADMIN — METOPROLOL TARTRATE 50 MG: 50 TABLET, FILM COATED ORAL at 21:54

## 2020-10-05 ASSESSMENT — PAIN SCALES - GENERAL
PAINLEVEL_OUTOF10: 0

## 2020-10-05 ASSESSMENT — PULMONARY FUNCTION TESTS
PIF_VALUE: 1

## 2020-10-05 ASSESSMENT — PAIN - FUNCTIONAL ASSESSMENT: PAIN_FUNCTIONAL_ASSESSMENT: 0-10

## 2020-10-05 ASSESSMENT — ENCOUNTER SYMPTOMS
CONSTIPATION: 0
SHORTNESS OF BREATH: 0
EYE REDNESS: 0
BACK PAIN: 0
NAUSEA: 0
EYE DISCHARGE: 0
SORE THROAT: 0
TROUBLE SWALLOWING: 0
RHINORRHEA: 0
DIARRHEA: 0
ABDOMINAL PAIN: 0
WHEEZING: 0
COUGH: 0

## 2020-10-05 NOTE — OP NOTE
Operative Note      Grover Hung  [id number]  YOB: 1980    Surgeon: Dania Hubbard DPM  Assistant: Jared Winslow PGY-1, Declan Tejeda  Pre-operative Diagnosis:     1. Osteomyelitis of the 3rd digit, right foot    2. Cellulitis, right foot    3. Full-thickness ulceration, right 3rd digit  Post-operative Diagnosis: same  Procedure: Amputation of the 3rd digit, right foot  Pathology: 3rd digit sent to pathology as a permanent specimen, clearance fragment taken of the 3rd metatarsal head, swab culture taken  Anesthesia: MAC  Hemostasis: ankle tourniquet, total time 33 minutes  Est. Blood Loss: <15cc   Materials: 4-0 vicryl, 4-0 nylon  Injectables: 10cc of a 1:1 mixture of 0.5% Marcaine plain and 1% Lidocaine plain; 10cc of 0.5% Marcaine plain    INDICATIONS FOR PROCEDURE: This patient has signs and symptoms clinically  consistent with the above mentioned preoperative diagnosis of osteomyelitis of the 3rd digit. Having failed conservative treatment, including IV antibiotics, it was determined that the patient would benefit from surgical intervention. All potential risks, benefits, and complications were discussed with the patient prior to the scheduling of surgery. All the patient's questions were answered and no guarantees were given. The patient wished to proceed with surgery, and informed written consent was obtained. DETAILS OF PROCEDURE: The patient was brought from the pre-operative area and placed on the operating table in the supine position. A pneumatic ankle tourniquet was placed around the patient's well-padded right lower extremity. Following IV sedation, a local anesthetic block was then injected proximal to the incision site consisting of 10cc of a 1:1 mixture of 0.5% Marcaine plain and 1% Lidocaine plain. The right  lower extremity was then scrubbed, prepped, and draped in the usual sterile fashion. A time-out was performed. The patient, procedure, and operative site were confirmed.  An Esmarch bandage was then utilized to exsanguinate the patient's right lower extremity. The tourniquet was then inflated to 250 mmHg and the following procedure was performed. Attention was then directed to the 3rd digit on the right foot. A towel clamp was then clamped around the distal aspect of the 3rd digit and used to stabilize the digit. A #15 surgical blade was used to make a full thickness incision through the skin. The incision was then deepened through the subcutaneous tissue to the level of bone. Next, the metatarsophalangeal joint was identified and attention was then directed to this location. A #15 blade was then used to release the extensor tendon as well as the medial and lateral collateral ligaments present at the metatarsophalangeal joint. The flexor tendon was then severed planarly at the level of the metatrsophalangeal joint and digit removed distally and passed from the operative field and placed on the back table. The digit was sent to pathology as a permanent specimen. Swab cultures were taken of the operative site as well. .   Attention was then directed to the head of the 3rd metatarsal. The cartilage was noted to white, shiny, pearly and hard, all which are consistent with healthy cartilage. The wound was flushed with 3L of normal saline via pulsed lavage. All gloves were changed. A small bone biopsy was taken of the 3rd metatarsal head using a rongeur to serve as a clearance fragment and sent to pathology as a permanent specimen. It was determined that no further resection was necessary. At this time, the incision site was flushed using copious amounts of normal saline. The deep structures were re-approximated using 4-0 vicryl. The skin was re-approximated using 4-0 nylon in a simple interrupted fashion. At this time, a local anesthetic was injected about the incision sites consisting of 10cc of 0.5% Marcaine plain, for the patient's postoperative comfort.  A soft sterile dressing was applied consisting of betadine ointment, adaptic, gauze, genet, and ace. The pneumatic ankle tourniquet was rapidly deflated after a total time of 33 minutes and a prompt hyperemic response was noted on all aspects of the patient's right lower extremity. END OF PROCEDURE: The patient tolerated the procedure and anesthesia well and was transported from the operating room to the PACU with vital signs stable and vascular status intact to all aspects of the patient's right lower extremity and digital capillary refill time immediate to the digits of the right  foot. Following a period of post-operative monitoring, the patient will be transferred back to his hospital room. The patient will continue to receive antibiotics. The dressing is to stay clean, dry, and intact.  He will follow up in my office within one week of discharge from the hospital.     Samy Ayon DPM  [phone]

## 2020-10-05 NOTE — PROGRESS NOTES
problems:    · Complicated right diabetic foot infection with osteomyelitis of the right third toe-covered with broad-spectrum antibiotics  · Fever and bandemia on admission-resolved  · Poorly controlled newly diagnosed diabetes mellitus  · Diabetic polyneuropathy  · Essential hypertension  · Ex-smoker  · Obesity Class 1 due to excess calorie intake : Body mass index is 32.5 kg/m². ·       Discussion:      The patient is on empiric linezolid, cefepime and Flagyl. White cell count was 12,200 on admission. Patient had a fever 100.4 on admission and fever curve is come down of there. Blood cultures from admission remain negative. MRI of the right foot reviewed. Shows osteomyelitis of third proximal phalanx of the right foot. Serum creatinine 0.7. Sed rate was 78 on admission    Ejection fraction is 40 to 45%. Had a transesophageal echo done today showing reduced ejection fraction, no endocarditis    Plan:     Diagnostic Workup:    · Follow up on liver and renal function  · Continue to follow  fever curve, WBC count and blood cultures  · Follow up on surgical culture and path    Antimicrobials:    · Will continue empiric linezolid 600 mg every 12 hour  · Continue IV cefepime 2 g every 12 hour  · Continue oral Flagyl 500 mg every 8 hour  · Patient has undergone right third toe amputation today  · Will likely not need long-term antibiotics if definitely surgical source control obtained  · DVT prophylaxis  · Maintain good glycemic control  · Discussed the above plan with patient and RN       Drug Monitoring:    · Continue monitoring for antibiotic toxicity as follows: CBC, CMP  · Continue to watch for following: new or worsening fever, new hypotension, hives, lip swelling and redness or purulence at vascular access sites. Current isolation precautions: There are no current isolations documented for this patient.      I/v access Management:    · Continue to monitor i.v access sites for erythema, Positive for fatigue. Negative for chills, diaphoresis and fever. HENT: Negative for ear discharge, ear pain, rhinorrhea, sore throat and trouble swallowing. Eyes: Negative for discharge and redness. Respiratory: Negative for cough, shortness of breath and wheezing. Cardiovascular: Negative for chest pain and leg swelling. Gastrointestinal: Negative for abdominal pain, constipation, diarrhea and nausea. Endocrine: Negative for polyuria. Genitourinary: Negative for dysuria, flank pain, frequency, hematuria and urgency. Musculoskeletal: Negative for back pain and myalgias. Postop pain in the right foot   Skin: Negative for rash. Neurological: Negative for dizziness, seizures and headaches. Hematological: Does not bruise/bleed easily. Psychiatric/Behavioral: Negative for hallucinations and suicidal ideas. All other systems reviewed and are negative. Past Medical History: All past medical history reviewed today. History reviewed. No pertinent past medical history. Past Surgical History: All past surgical history was reviewed today. History reviewed. No pertinent surgical history. Family History: All family history was reviewed today. Problem Relation Age of Onset    Diabetes Mother     Diabetes Father     Diabetes Sister        Objective:       PHYSICAL EXAM:      Vitals:   Vitals:    10/05/20 0342 10/05/20 0815 10/05/20 1227 10/05/20 1402   BP: (!) 148/93 (!) 162/112 120/79 (!) 153/94   Pulse: 81 80 80 83   Resp: 16  15 18   Temp: 98.4 °F (36.9 °C)  98.2 °F (36.8 °C) 97.6 °F (36.4 °C)   TempSrc: Oral  Oral Temporal   SpO2: 94%  98% 94%   Weight:       Height:           Physical Exam  Vitals signs and nursing note reviewed. Constitutional:       Appearance: Normal appearance. He is well-developed. HENT:      Head: Normocephalic and atraumatic.       Right Ear: External ear normal.      Left Ear: External ear normal.      Nose: Nose normal. No congestion or rhinorrhea. Mouth/Throat:      Mouth: Mucous membranes are moist.      Pharynx: No oropharyngeal exudate or posterior oropharyngeal erythema. Eyes:      General: No scleral icterus. Right eye: No discharge. Left eye: No discharge. Conjunctiva/sclera: Conjunctivae normal.      Pupils: Pupils are equal, round, and reactive to light. Neck:      Musculoskeletal: Normal range of motion and neck supple. No neck rigidity or muscular tenderness. Cardiovascular:      Rate and Rhythm: Normal rate and regular rhythm. Pulses: Normal pulses. Heart sounds: No murmur. No friction rub. Pulmonary:      Effort: Pulmonary effort is normal. No respiratory distress. Breath sounds: Normal breath sounds. No stridor. No wheezing, rhonchi or rales. Abdominal:      General: Bowel sounds are normal.      Palpations: Abdomen is soft. Tenderness: There is no abdominal tenderness. There is no right CVA tenderness, left CVA tenderness, guarding or rebound. Musculoskeletal: Normal range of motion. General: No swelling or tenderness. Lymphadenopathy:      Cervical: No cervical adenopathy. Skin:     General: Skin is warm and dry. Coloration: Skin is not jaundiced. Findings: No erythema or rash. Comments: Surgical dressing noted at right third toe amputation site   Neurological:      General: No focal deficit present. Mental Status: He is alert and oriented to person, place, and time. Mental status is at baseline. Motor: No abnormal muscle tone. Psychiatric:         Mood and Affect: Mood normal.         Behavior: Behavior normal.         Thought Content: Thought content normal.            Lines: All vascular access sites are healthy with no local erythema, discharge or tenderness. Intake and output:    I/O last 3 completed shifts:   In: 200 [P.O.:240; IV Piggyback:50]  Out: -     Lab Data:   All available labs and old records have been reviewed by me.    CBC:  Recent Labs     10/03/20  0252 10/04/20  0529 10/05/20  0727   WBC 8.2 6.9 7.0   RBC 4.41 4.52 4.83   HGB 13.3* 13.3* 14.0   HCT 38.5* 39.6* 41.8    237 263   MCV 87.3 87.6 86.6   MCH 30.3 29.5 28.9   MCHC 34.7 33.6 33.4   RDW 13.9 13.9 13.8        BMP:  Recent Labs     10/03/20  0252 10/04/20  0529 10/05/20  0727   * 136 135*   K 3.4* 3.5 3.4*    101 98*   CO2 25 27 28   BUN 14 10 7   CREATININE 0.8* 0.8* 0.7*   CALCIUM 8.4 8.4 8.6   GLUCOSE 206* 173* 179*        Hepatic Function Panel:   Lab Results   Component Value Date    ALKPHOS 158 10/01/2020    ALT 15 10/01/2020    AST 12 10/01/2020    PROT 7.3 10/01/2020    BILITOT 0.7 10/01/2020    LABALBU 2.8 10/01/2020       CPK: No results found for: CKTOTAL  ESR:   Lab Results   Component Value Date    SEDRATE 78 (H) 10/03/2020     CRP: No results found for: CRP        Imaging: All pertinent images and reports for the current visit were reviewed by me during this visit. MRI FOOT RIGHT W WO CONTRAST   Final Result   Cellulitis most prominent involving the 3rd digit where there is a soft   tissue ulceration along the dorsal aspect extending to the PIP joint. Early   erosive changes at the PIP joint and findings of osteomyelitis involving the   middle and proximal phalanx of the 3rd digit. No defined fluid collection. VL Extremity Venous Right   Final Result      XR FOOT RIGHT (MIN 3 VIEWS)   Final Result   No acute osseous abnormality right foot. Soft tissue edema presumably reflecting cellulitis, contusion and/or sprain. Follow-up imaging recommended if pain persists or worsens following   conservative management. NM MYOCARDIAL SPECT REST EXERCISE OR RX    (Results Pending)       Medications: All current and past medications were reviewed.      [START ON 10/6/2020] hydroCHLOROthiazide  25 mg Oral Daily    glipiZIDE  15 mg Oral QAM AC    metoprolol tartrate  50 mg Oral BID    NIFEdipine  60 mg Oral pictures or media included in this note were obtained after taking informed verbal consent from the patient and with their approval to include those in the patient's medical record.     Stephanie Rose MD, MPH  10/5/2020 , 2:56 PM   [facility]   [address] [place] [address], [address] [place] [address]  Office: [phone]  Fax: [phone]  Clinic days:  Tuesday & Thursday

## 2020-10-05 NOTE — ANESTHESIA POSTPROCEDURE EVALUATION
Department of Anesthesiology  Postprocedure Note    Patient: Radhika Randle  MRN: [id number]  YOB: 1980  Date of evaluation: 10/5/2020  Time:  9:50 AM     Procedure Summary     Date:  10/05/20 Room / Location:  [facility] Cath Lab; [facility] Echocardiography    Anesthesia Start:  8935 Anesthesia Stop:      Procedure:  ECHO SHEBA IN CARDIAC CATH Diagnosis:      Scheduled Providers:   Responsible Provider:  Erika Hill MD    Anesthesia Type:  MAC ASA Status:  3          Anesthesia Type: No value filed. Liborio Phase I:      Liborio Phase II:      Last vitals: Reviewed and per EMR flowsheets.        Anesthesia Post Evaluation    Patient location during evaluation: PACU  Patient participation: complete - patient participated  Level of consciousness: awake and awake and alert  Pain score: 2  Airway patency: patent  Nausea & Vomiting: no vomiting  Complications: no  Cardiovascular status: blood pressure returned to baseline  Respiratory status: acceptable  Hydration status: euvolemic

## 2020-10-05 NOTE — ANESTHESIA PRE PROCEDURE
Department of Anesthesiology  Preprocedure Note       Name:  Patrick Navarro   Age:  45 y.o.  :  1980                                          MRN:  [id number]         Date:  10/5/2020      Surgeon: Abril Galo):  Efrain Sellers DPM    Procedure: Procedure(s):  RIGHT THIRD TOE AMPUTATION    Medications prior to admission:   Prior to Admission medications    Not on File       Current medications:    No current facility-administered medications for this visit. No current outpatient medications on file.      Facility-Administered Medications Ordered in Other Visits   Medication Dose Route Frequency Provider Last Rate Last Dose    glipiZIDE (GLUCOTROL) tablet 15 mg  15 mg Oral QAM AC Kraft Fitting, MD   Stopped at 10/05/20 0606    metoprolol tartrate (LOPRESSOR) tablet 50 mg  50 mg Oral BID Kraft Fitting, MD   50 mg at 10/04/20 215    labetalol (NORMODYNE;TRANDATE) injection 10 mg  10 mg Intravenous Q4H PRN Kraft Fitting, MD        NIFEdipine (PROCARDIA XL) extended release tablet 60 mg  60 mg Oral Daily Kraft Fitting, MD        insulin lispro (1 Unit Dial) 0-18 Units  0-18 Units Subcutaneous TID WC Kraft Fitting, MD   3 Units at 10/04/20 1234    insulin lispro (1 Unit Dial) 0-9 Units  0-9 Units Subcutaneous Nightly Kraft Fitting, MD   2 Units at 10/04/20 2150    metFORMIN (GLUCOPHAGE) tablet 1,000 mg  1,000 mg Oral BID WC Kraft Fitting, MD   1,000 mg at 10/04/20 1709    perflutren lipid microspheres (DEFINITY) injection 1.65 mg  1.5 mL Intravenous ONCE PRN Kraft Fitting, MD        glucose (GLUTOSE) 40 % oral gel 15 g  15 g Oral PRN Kraft Fitting, MD        dextrose 50 % IV solution  12.5 g Intravenous PRN Kraft Fitting, MD        glucagon (rDNA) injection 1 mg  1 mg Intramuscular PRN Kraft Fitting, MD        dextrose 5 % solution  100 mL/hr Intravenous PRN Kraft Fitting, MD        losartan (COZAAR) tablet 100 mg  100 mg Oral Daily Kraft Fitting, MD   100 mg at 10/04/20 0838    linezolid (Dahlia Velez) IVPB 600 mg  600 mg Intravenous Q12H Trey Ochoa MD   Stopped at 10/05/20 0654    metroNIDAZOLE (FLAGYL) tablet 500 mg  500 mg Oral 3 times per day Trey Ochoa MD   500 mg at 10/05/20 0554    sodium chloride flush 0.9 % injection 10 mL  10 mL Intravenous 2 times per day Maxx Leyva MD        sodium chloride flush 0.9 % injection 10 mL  10 mL Intravenous PRN Maxx Leyva MD        cefepime (MAXIPIME) 2 g IVPB minibag  2 g Intravenous Q12H Maxx Leyva MD   Stopped at 10/05/20 0405    sodium chloride flush 0.9 % injection 10 mL  10 mL Intravenous 2 times per day Maxx Leyva MD   10 mL at 10/03/20 2139    sodium chloride flush 0.9 % injection 10 mL  10 mL Intravenous PRN Maxx Leyva MD   10 mL at 10/03/20 1009    acetaminophen (TYLENOL) tablet 650 mg  650 mg Oral Q4H PRN Maxx Leyva MD   650 mg at 10/04/20 1814    Or    acetaminophen (TYLENOL) suppository 650 mg  650 mg Rectal Q4H PRN Maxx Leyva MD        polyethylene glycol [facility]) packet 17 g  17 g Oral Daily PRN Maxx Leyva MD        ondansetron [facility]) injection 4 mg  4 mg Intravenous Q4H PRN Maxx Leyva MD        enoxaparin (LOVENOX) injection 40 mg  40 mg Subcutaneous Daily Maxx Leyva MD   40 mg at 10/04/20 3774       Allergies:  No Known Allergies    Problem List:    Patient Active Problem List   Diagnosis Code    Cellulitis of right foot L03.115    Sepsis ([facility] [address] A41.9    Fever R50.9    Tachycardia A85.3    Neutrophilic leukocytosis L25.2    DMII (diabetes mellitus, type 2) ([facility] [address] E11.9    Essential hypertension I10       Past Medical History:  No past medical history on file. Past Surgical History:  No past surgical history on file. Social History:    Social History     Tobacco Use    Smoking status: Former Smoker     Packs/day: 0.25     Years: 10.00     Pack years: 2.50     Types: Cigarettes    Smokeless tobacco: Never Used   Substance Use Topics    Alcohol use:  Yes Alcohol/week: 3.0 standard drinks     Types: 3 Cans of beer per week                                Counseling given: Not Answered      Vital Signs (Current): There were no vitals filed for this visit.                                            BP Readings from Last 3 Encounters:   10/05/20 (!) 162/112   10/05/20 132/83       NPO Status:                                                                                 BMI:   Wt Readings from Last 3 Encounters:   10/01/20 226 lb 8 oz (102.7 kg)     There is no height or weight on file to calculate BMI.    CBC:   Lab Results   Component Value Date    WBC 7.0 10/05/2020    RBC 4.83 10/05/2020    HGB 14.0 10/05/2020    HCT 41.8 10/05/2020    MCV 86.6 10/05/2020    RDW 13.8 10/05/2020     10/05/2020       CMP:   Lab Results   Component Value Date     10/05/2020    K 3.4 10/05/2020    CL 98 10/05/2020    CO2 28 10/05/2020    BUN 7 10/05/2020    CREATININE 0.7 10/05/2020    GFRAA >60 10/05/2020    AGRATIO 0.6 10/01/2020    LABGLOM >60 10/05/2020    GLUCOSE 179 10/05/2020    PROT 7.3 10/01/2020    CALCIUM 8.6 10/05/2020    BILITOT 0.7 10/01/2020    ALKPHOS 158 10/01/2020    AST 12 10/01/2020    ALT 15 10/01/2020       POC Tests:   Recent Labs     10/05/20  0730   POCGLU 160*       Coags: No results found for: PROTIME, INR, APTT    HCG (If Applicable): No results found for: PREGTESTUR, PREGSERUM, HCG, HCGQUANT     ABGs: No results found for: PHART, PO2ART, ZUS8JVG, FMP4KBJ, BEART, T7JNJQLC     Type & Screen (If Applicable):  No results found for: LABABO, LABRH    Drug/Infectious Status (If Applicable):  No results found for: HIV, HEPCAB    COVID-19 Screening (If Applicable): No results found for: COVID19      Anesthesia Evaluation  Patient summary reviewed and Nursing notes reviewed  Airway: Mallampati: II  TM distance: >3 FB   Neck ROM: full  Mouth opening: > = 3 FB Dental:          Pulmonary:                              Cardiovascular:  Exercise tolerance: poor (<4 METS),   (+) hypertension: moderate,                   Neuro/Psych:               GI/Hepatic/Renal:             Endo/Other:    (+) DiabetesType II DM, well controlled, , .                 Abdominal:           Vascular:                                          Anesthesia Plan      MAC     ASA 3       Induction: intravenous. MIPS: Prophylactic antiemetics administered. Anesthetic plan and risks discussed with patient. Plan discussed with CRNA.                   Ayah Vazquez MD   10/5/2020

## 2020-10-05 NOTE — ANESTHESIA PRE PROCEDURE
Department of Anesthesiology  Preprocedure Note       Name:  Vladimir Melgoza   Age:  45 y.o.  :  1980                                          MRN:  [id number]         Date:  10/5/2020      Surgeon: * No surgeons listed *    Procedure: * No procedures listed *    Medications prior to admission:   Prior to Admission medications    Not on File       Current medications:    Current Facility-Administered Medications   Medication Dose Route Frequency Provider Last Rate Last Dose    glipiZIDE (GLUCOTROL) tablet 15 mg  15 mg Oral QAM AC Nidia March MD   Stopped at 10/05/20 0606    metoprolol tartrate (LOPRESSOR) tablet 50 mg  50 mg Oral BID Nidia March MD   50 mg at 10/04/20 2151    labetalol (NORMODYNE;TRANDATE) injection 10 mg  10 mg Intravenous Q4H PRN Nidia March MD        NIFEdipine (PROCARDIA XL) extended release tablet 60 mg  60 mg Oral Daily Nidia March MD        insulin lispro (1 Unit Dial) 0-18 Units  0-18 Units Subcutaneous TID  Nidia March MD   3 Units at 10/04/20 1234    insulin lispro (1 Unit Dial) 0-9 Units  0-9 Units Subcutaneous Nightly Nidia March MD   2 Units at 10/04/20 2150    metFORMIN (GLUCOPHAGE) tablet 1,000 mg  1,000 mg Oral BID  Nidia March MD   1,000 mg at 10/04/20 1709    perflutren lipid microspheres (DEFINITY) injection 1.65 mg  1.5 mL Intravenous ONCE PRN Nidia March MD        glucose (GLUTOSE) 40 % oral gel 15 g  15 g Oral PRN Nidia aMrch MD        dextrose 50 % IV solution  12.5 g Intravenous PRN Nidia March MD        glucagon (rDNA) injection 1 mg  1 mg Intramuscular PRN Nidia March MD        dextrose 5 % solution  100 mL/hr Intravenous PRN Nidia March MD        losartan (COZAAR) tablet 100 mg  100 mg Oral Daily Nidia March MD   100 mg at 10/04/20 0838    linezolid (ZYVOX) IVPB 600 mg  600 mg Intravenous Q12H Bob Wilkins MD   Stopped at 10/05/20 0654    metroNIDAZOLE (FLAGYL) tablet 500 mg  500 mg Oral 3 times per day Candelaria Dickson MD   500 mg at 10/05/20 0554    sodium chloride flush 0.9 % injection 10 mL  10 mL Intravenous 2 times per day Idania Patel MD        sodium chloride flush 0.9 % injection 10 mL  10 mL Intravenous PRN Idania Patel MD        cefepime (MAXIPIME) 2 g IVPB minibag  2 g Intravenous Q12H Idania Patel MD   Stopped at 10/05/20 0405    sodium chloride flush 0.9 % injection 10 mL  10 mL Intravenous 2 times per day Idania Patel MD   10 mL at 10/03/20 2139    sodium chloride flush 0.9 % injection 10 mL  10 mL Intravenous PRN Idania Patel MD   10 mL at 10/03/20 1009    acetaminophen (TYLENOL) tablet 650 mg  650 mg Oral Q4H PRN Idania Patel MD   650 mg at 10/04/20 1814    Or    acetaminophen (TYLENOL) suppository 650 mg  650 mg Rectal Q4H PRN Idania Patel MD        polyethylene glycol [facility]) packet 17 g  17 g Oral Daily PRN Idania Patel MD        ondansetron [facility]) injection 4 mg  4 mg Intravenous Q4H PRN Idania Patel MD        enoxaparin (LOVENOX) injection 40 mg  40 mg Subcutaneous Daily Idania Patel MD   40 mg at 10/04/20 9667       Allergies:  No Known Allergies    Problem List:    Patient Active Problem List   Diagnosis Code    Cellulitis of right foot L03.115    Sepsis ([facility] [address] A41.9    Fever R50.9    Tachycardia O35.6    Neutrophilic leukocytosis Z93.2    DMII (diabetes mellitus, type 2) ([facility] [address] E11.9    Essential hypertension I10       Past Medical History:  History reviewed. No pertinent past medical history. Past Surgical History:  History reviewed. No pertinent surgical history. Social History:    Social History     Tobacco Use    Smoking status: Former Smoker     Packs/day: 0.25     Years: 10.00     Pack years: 2.50     Types: Cigarettes    Smokeless tobacco: Never Used   Substance Use Topics    Alcohol use:  Yes     Alcohol/week: 3.0 standard drinks     Types: 3 Cans of beer per week                                Counseling given: Not Answered      Vital Signs (Current):   Vitals:    10/04/20 2146 10/05/20 0110 10/05/20 0342 10/05/20 0815   BP: 138/86 125/75 (!) 148/93 (!) 162/112   Pulse: 88 77 81 80   Resp: 16 16 16    Temp: 98.3 °F (36.8 °C) 98.4 °F (36.9 °C) 98.4 °F (36.9 °C)    TempSrc: Oral Oral Oral    SpO2: 95% 96% 94%    Weight:       Height:                                                  BP Readings from Last 3 Encounters:   10/05/20 (!) 162/112       NPO Status:                                                                                 BMI:   Wt Readings from Last 3 Encounters:   10/01/20 226 lb 8 oz (102.7 kg)     Body mass index is 32.5 kg/m².     CBC:   Lab Results   Component Value Date    WBC 7.0 10/05/2020    RBC 4.83 10/05/2020    HGB 14.0 10/05/2020    HCT 41.8 10/05/2020    MCV 86.6 10/05/2020    RDW 13.8 10/05/2020     10/05/2020       CMP:   Lab Results   Component Value Date     10/05/2020    K 3.4 10/05/2020    CL 98 10/05/2020    CO2 28 10/05/2020    BUN 7 10/05/2020    CREATININE 0.7 10/05/2020    GFRAA >60 10/05/2020    AGRATIO 0.6 10/01/2020    LABGLOM >60 10/05/2020    GLUCOSE 179 10/05/2020    PROT 7.3 10/01/2020    CALCIUM 8.6 10/05/2020    BILITOT 0.7 10/01/2020    ALKPHOS 158 10/01/2020    AST 12 10/01/2020    ALT 15 10/01/2020       POC Tests:   Recent Labs     10/05/20  0730   POCGLU 160*       Coags: No results found for: PROTIME, INR, APTT    HCG (If Applicable): No results found for: PREGTESTUR, PREGSERUM, HCG, HCGQUANT     ABGs: No results found for: PHART, PO2ART, IIK9HML, YLF1XKP, BEART, R6NXYQUA     Type & Screen (If Applicable):  No results found for: LABABO, LABRH    Drug/Infectious Status (If Applicable):  No results found for: HIV, HEPCAB    COVID-19 Screening (If Applicable): No results found for: COVID19      Anesthesia Evaluation  Patient summary reviewed and Nursing notes reviewed  Airway: Mallampati: II  TM distance: >3 FB   Neck ROM: full  Mouth opening: > = 3 FB Dental:

## 2020-10-05 NOTE — PROGRESS NOTES
Shift assessment complete, see flowsheet. Patient in bed, no s/s of distress, respirations even and unlabored, blood pressure is better, right foot red and swollen, denies any pain, aware of NPO status past midnight for surgery tomorrow. Communicated with patient in [facility]. Patient independent in room. The care plan and education has been reviewed and mutually agreed upon with the patient. All needs attended. Call light within reach. Will continue to monitor.

## 2020-10-05 NOTE — PROGRESS NOTES
[facility] PROGRESS NOTE    10/5/2020 1:07 PM        Name: Ever Dewey . Admitted: 10/1/2020  Primary Care Provider: No primary care provider on file. (Tel: None)    Brief Course: Patient is a 67-year-old man with no prior known history of chronic illness. He presents to the emergency room for evaluation of increased pain, swelling and erythema of his right forefoot. In addition, evaluation revealed previously undiagnosed hypertension and diabetes mellitus. He was admitted under hospitalist service for further evaluation and treatment. Podiatry was involved due to concern for abscess and osteomyelitis. MRI on 10/3 showed right foot osteomyelitis. ID service was consulted. 2D echocardiogram done on 10/3 showed severe left ventricular hypertrophy, LVEF of 40 to 45%, global hypokinesis and septum with infiltrative process. Cardiology was consulted on 10/3. SHEBA performed 10/5, as per cardiology note no vegetation. Patient scheduled for or with podiatry 10/5. CC: pain, swelling and erythema of his right foot  Subjective: Patient reports improving pain in his right foot. BPs remain mildly elevated as well. Blood sugars are better controlled but not at target. Patient is constantly fixated on when he could be discharged home.     Reviewed interval ancillary notes    Current Medications  HYDROmorphone (DILAUDID) injection 0.5 mg, Q5 Min PRN  oxyCODONE-acetaminophen (PERCOCET) 5-325 MG per tablet 1 tablet, Once PRN  ondansetron (ZOFRAN) injection 4 mg, Once PRN  labetalol (NORMODYNE;TRANDATE) injection 5 mg, Q10 Min PRN  hydrALAZINE (APRESOLINE) injection 5 mg, Q10 Min PRN  meperidine (DEMEROL) injection 12.5 mg, Q5 Min PRN  [START ON 10/6/2020] hydroCHLOROthiazide (HYDRODIURIL) tablet 25 mg, Daily  potassium chloride (KLOR-CON M) extended release tablet 40 mEq, PRN    Or  potassium bicarb-citric acid (EFFER-K) effervescent tablet 40 mEq, PRN    Or  potassium chloride 10 mEq/100 mL IVPB (Peripheral Line), PRN  glipiZIDE (GLUCOTROL) tablet 15 mg, QAM AC  metoprolol tartrate (LOPRESSOR) tablet 50 mg, BID  labetalol (NORMODYNE;TRANDATE) injection 10 mg, Q4H PRN  NIFEdipine (PROCARDIA XL) extended release tablet 60 mg, Daily  insulin lispro (1 Unit Dial) 0-18 Units, TID WC  insulin lispro (1 Unit Dial) 0-9 Units, Nightly  metFORMIN (GLUCOPHAGE) tablet 1,000 mg, BID WC  perflutren lipid microspheres (DEFINITY) injection 1.65 mg, ONCE PRN  glucose (GLUTOSE) 40 % oral gel 15 g, PRN  dextrose 50 % IV solution, PRN  glucagon (rDNA) injection 1 mg, PRN  dextrose 5 % solution, PRN  losartan (COZAAR) tablet 100 mg, Daily  linezolid (ZYVOX) IVPB 600 mg, Q12H  metroNIDAZOLE (FLAGYL) tablet 500 mg, 3 times per day  sodium chloride flush 0.9 % injection 10 mL, 2 times per day  sodium chloride flush 0.9 % injection 10 mL, PRN  cefepime (MAXIPIME) 2 g IVPB minibag, Q12H  sodium chloride flush 0.9 % injection 10 mL, 2 times per day  sodium chloride flush 0.9 % injection 10 mL, PRN  acetaminophen (TYLENOL) tablet 650 mg, Q4H PRN    Or  acetaminophen (TYLENOL) suppository 650 mg, Q4H PRN  polyethylene glycol (GLYCOLAX) packet 17 g, Daily PRN  ondansetron (ZOFRAN) injection 4 mg, Q4H PRN  enoxaparin (LOVENOX) injection 40 mg, Daily        Objective:  /79   Pulse 80   Temp 98.2 °F (36.8 °C) (Oral)   Resp 15   Ht 5' 10\" (1.778 m)   Wt 226 lb 8 oz (102.7 kg)   SpO2 98%   BMI 32.50 kg/m²     Intake/Output Summary (Last 24 hours) at 10/5/2020 1307  Last data filed at 10/5/2020 0932  Gross per 24 hour   Intake 390 ml   Output --   Net 390 ml      Wt Readings from Last 3 Encounters:   10/01/20 226 lb 8 oz (102.7 kg)     General appearance:Overweight  male,  Appears comfortable  ENT: Moist oral mucosa. Trachea midline, no adenopathy. Cardiovascular: Regular rhythm, normal S1, S2. No murmur.  No edema in lower extremities  Respiratory: Not using accessory muscles. Good inspiratory effort. Clear to auscultation bilaterally, no wheeze or crackles. GI: Abdomen soft, no tenderness, not distended, normal bowel sounds  Musculoskeletal: No cyanosis in digits, neck supple  Psych: Normal affect. Alert and oriented in time, place and person  Skin: Warm, dry, normal turgor, Right foot with deformity of toes, swelling and erythema, distal plantar aspect of his foot is very tender.   Extremity exam shows brisk capillary refill.  Peripheral pulses are palpable in lower extremities            Labs and Tests:  CBC:   Recent Labs     10/03/20  0252 10/04/20  0529 10/05/20  0727   WBC 8.2 6.9 7.0   HGB 13.3* 13.3* 14.0    237 263     BMP:    Recent Labs     10/03/20  0252 10/04/20  0529 10/05/20  0727   * 136 135*   K 3.4* 3.5 3.4*    101 98*   CO2 25 27 28   BUN 14 10 7   CREATININE 0.8* 0.8* 0.7*   GLUCOSE 206* 173* 179*     Hepatic:   No results for input(s): AST, ALT, ALB, BILITOT, ALKPHOS in the last 72 hours. MRI FOOT RIGHT W WO CONTRAST   Final Result   Cellulitis most prominent involving the 3rd digit where there is a soft   tissue ulceration along the dorsal aspect extending to the PIP joint. Early   erosive changes at the PIP joint and findings of osteomyelitis involving the   middle and proximal phalanx of the 3rd digit. No defined fluid collection. VL Extremity Venous Right   Final Result      XR FOOT RIGHT (MIN 3 VIEWS)   Final Result   No acute osseous abnormality right foot. Soft tissue edema presumably reflecting cellulitis, contusion and/or sprain. Follow-up imaging recommended if pain persists or worsens following   conservative management.          NM MYOCARDIAL SPECT REST EXERCISE OR RX    (Results Pending)     Problem List  Principal Problem:    Cellulitis of right foot  Active Problems:    Sepsis ([address]    Fever    Tachycardia    Neutrophilic leukocytosis    DMII (diabetes mellitus, type 2) ([facility]    Essential hypertension  Resolved Problems:    * No resolved hospital problems. *       Assessment & Plan:     Right foot osteomyelitis:   Patient was started on IV vancomycin and cefepime on admission. Podiatry was consulted. Patient underwent MRI foot. ID was consulted after MRI foot showed osteomyelitis. Patient is currently on IV cefepime, IV linezolid and IV Flagyl. Patient is scheduled to undergo toe amputation on Monday by podiatry service. Will need to follow-up with ID recommendations on antibiotic regimen.     Chronic undiagnosed, untreated type-2 DM, complicated by neuropathy and foot osteomyelitis:   Hemoglobin A1c of 11.7. Patient does not have medical insurance. Unable to afford medications on his own. Currently on metformin 1 g p.o. twice daily and glipizide 10 mg p.o. daily. POC blood sugars and SSI to cover. If social issues can be resolved, and if unable to achieve adequate control of blood sugars, will need to initiate insulin therapy. Consulted diabetes educator and dietitian.     Undiagnosed, untreated HTN:   Patient was initiated on losartan, metoprolol, and Procardia. Currently on losartan 100 mg p.o. daily and metoprolol 25 mg p.o. twice daily. BPs continue to trend high. Increase the dose of metoprolol to 50 mg p.o. twice daily. Given additional dose of Procardia 30 mg p.o. x1 and increase the dose of Procardia XL to 60 mg p.o. daily. EKG and 2D echo suggestive of left ventricular hypertrophy from chronic untreated hypertension.     LVH, cardiomyopathy:   2D echocardiogram was done on 10/3 to evaluate cardiac function showed severe left ventricular hypertrophy with global hypokinesis, LVEF of 40 to 45%. Possible septal infiltrative process reported. Cardiology consulted. Patient is scheduled to undergo SHEAB to rule out endocarditis.   Recommend cardiac MRI as outpatient to further evaluate the infiltrative process.     IV Access: Peripheral IV  Guerin: No   Diet: DIET CARB CONTROL; Carb Control: 3 carb choices (45 gms)/meal  Diet NPO, After Midnight  Code:Full Code  DVT PPX Lovenox subcu daily  Disposition pending toe amputation on Monday, SHEBA as per cardiology service, final ID recommendations, social work consult since patient lacks health insurance, unable to afford medications on his own.     Jennifer Genao MD   10/5/2020 1:07 PM

## 2020-10-05 NOTE — PROGRESS NOTES
[facility]  Daily Progress Note    Admit Date:  10/1/2020      CC: CM. Osteo, dm, htn  Subj: Today, doing well.   Denies cp, sob    Obj:   BP (!) 162/112   Pulse 80   Temp 98.4 °F (36.9 °C) (Oral)   Resp 16   Ht 5' 10\" (1.778 m)   Wt 226 lb 8 oz (102.7 kg)   SpO2 94%   BMI 32.50 kg/m²       Intake/Output Summary (Last 24 hours) at 10/5/2020 0945  Last data filed at 10/5/2020 0554  Gross per 24 hour   Intake 290 ml   Output --   Net 290 ml     Gen Alert, coop, no distress Heart  RRR, no MRG, nl apical impulse   Head NC, AT, no abnorm Abd  Soft, NT, +BS, no mass, no OM   Eyes PERRLA, conj/corn clear Ext  No change foot exam   Nose Nares nl, no drain, NT Pulse decr   Throat Lips, mucosa, tongue nl Skin Color/text/turg nl, no rash/lesions   Neck S/S, TM, NT, no bruit/JVD Psych Nl mood and affect   Lung CTA-B, unlabored, no DTP Lymph   No cervical or axillary LA   Ch wall NT, no deform Neuro  Nl gross M/S exam     Medications:    glipiZIDE  15 mg Oral QAM AC    metoprolol tartrate  50 mg Oral BID    NIFEdipine  60 mg Oral Daily    insulin lispro  0-18 Units Subcutaneous TID WC    insulin lispro  0-9 Units Subcutaneous Nightly    metFORMIN  1,000 mg Oral BID WC    losartan  100 mg Oral Daily    linezolid  600 mg Intravenous Q12H    metroNIDAZOLE  500 mg Oral 3 times per day    sodium chloride flush  10 mL Intravenous 2 times per day    cefepime  2 g Intravenous Q12H    sodium chloride flush  10 mL Intravenous 2 times per day    enoxaparin  40 mg Subcutaneous Daily      dextrose       Lab Data: Relevant and available CV data reviewed    ASSESSMENT AND PLAN     *Cardiomyopathy  Status New, incidental finding on echo, may be due to uncontrolled HTN  Plan SHEBA with fairly dense septum, LVH noted, EF reduced, no endocarditis   Suspect CM related to uncontrolled longstanding HTN   Check lexiscan MPI  *Osteo  Per med/surgery  *DM  Status New diagnosis  Plan Per med  *HTN  Status New diagnosis, likely longstanding  Plan Titrate antihypertensives to systolic <983

## 2020-10-05 NOTE — BRIEF OP NOTE
Brief Postoperative Note      Patient: Nilton Stubbs  YOB: 1980  MRN: [id number]    Date of Procedure: 10/5/2020    Pre-operative Diagnosis:     1. Osteomyelitis of the 3rd digit, right foot    2. Cellulitis, right foot    3. Full-thickness ulceration, right 3rd digit    Post-operative Diagnosis: same    Procedure: Amputation of the 3rd digit, right foot    Procedure(s):  RIGHT THIRD TOE AMPUTATION    Surgeon(s):  Felice Gibson DPM     Assistant: Era Finddiann PGY-1, Soy Castro MS-4    Pathology: 3rd digit sent to pathology as a permanent specimen, clearance fragment taken of the 3rd metatarsal head, swab culture taken    Anesthesia: MAC    Hemostasis: ankle tourniquet 250mmHg, total time 33 minutes    Est. Blood Loss: <15cc     Materials: 4-0 vicryl, 4-0 nylon    Injectables: 10cc of a 1:1 mixture of 0.5% Marcaine plain and 1% Lidocaine plain; 10cc of 0.5% Marcaine plain    Complications: None    Specimens:   ID Type Source Tests Collected by Time Destination   1 : right third toe base Tissue Tissue CULTURE, FUNGUS, CULTURE, TISSUE, CULTURE WITH SMEAR, ACID FAST BACILLIUS Felice Gibson DPM 10/5/2020 1517    A : Right third toe Tissue Tissue SURGICAL PATHOLOGY Felice Gibson DPM 10/5/2020 1517    B : Right third MetatarsaL Head Tissue Tissue SURGICAL PATHOLOGY Felice Gibson DPM 10/5/2020 1524        Implants:  * No implants in log *      Drains: * No LDAs found *    Findings: No purulence identified. Clearance fragment taken of 3rd metatarsal head for pathology.      Dottie Marquis DPM  Podiatric Resident, PGY-1  Pager #: [phone] or PerfectServe    Electronically signed by Angel Malik DPM on 10/5/2020 at 3:50 PM

## 2020-10-05 NOTE — ANESTHESIA POSTPROCEDURE EVALUATION
Department of Anesthesiology  Postprocedure Note    Patient: Margaret Fay  MRN: [id number]  YOB: 1980  Date of evaluation: 10/5/2020  Time:  3:53 PM     Procedure Summary     Date:  10/05/20 Room / Location:  [address]    Anesthesia Start:  2299 Anesthesia Stop:      Procedure:  RIGHT THIRD TOE AMPUTATION (Right Foot) Diagnosis:  (right foot infection)    Surgeon:  Enoch Amezcua DPM Responsible Provider:  Camryn Lazo MD    Anesthesia Type:  MAC ASA Status:  3          Anesthesia Type: MAC    Liborio Phase I:      Liborio Phase II:      Last vitals: Reviewed and per EMR flowsheets.        Anesthesia Post Evaluation    Patient location during evaluation: PACU  Patient participation: complete - patient participated  Level of consciousness: awake  Airway patency: patent  Nausea & Vomiting: no nausea and no vomiting  Complications: no  Cardiovascular status: blood pressure returned to baseline  Respiratory status: acceptable  Hydration status: euvolemic

## 2020-10-06 LAB
ANION GAP SERPL CALCULATED.3IONS-SCNC: 11 MMOL/L (ref 3–16)
BASOPHILS ABSOLUTE: 0 K/UL (ref 0–0.2)
BASOPHILS RELATIVE PERCENT: 0.2 %
BUN BLDV-MCNC: 14 MG/DL (ref 7–20)
CALCIUM SERPL-MCNC: 8.8 MG/DL (ref 8.3–10.6)
CHLORIDE BLD-SCNC: 95 MMOL/L (ref 99–110)
CO2: 27 MMOL/L (ref 21–32)
CREAT SERPL-MCNC: 0.8 MG/DL (ref 0.9–1.3)
EOSINOPHILS ABSOLUTE: 0 K/UL (ref 0–0.6)
EOSINOPHILS RELATIVE PERCENT: 0.1 %
GFR AFRICAN AMERICAN: >60
GFR NON-AFRICAN AMERICAN: >60
GLUCOSE BLD-MCNC: 101 MG/DL (ref 70–99)
GLUCOSE BLD-MCNC: 202 MG/DL (ref 70–99)
GLUCOSE BLD-MCNC: 220 MG/DL (ref 70–99)
GLUCOSE BLD-MCNC: 80 MG/DL (ref 70–99)
GLUCOSE BLD-MCNC: 96 MG/DL (ref 70–99)
HCT VFR BLD CALC: 44.9 % (ref 40.5–52.5)
HEMOGLOBIN: 14.8 G/DL (ref 13.5–17.5)
LV EF: 44 %
LVEF MODALITY: NORMAL
LYMPHOCYTES ABSOLUTE: 0.8 K/UL (ref 1–5.1)
LYMPHOCYTES RELATIVE PERCENT: 8.4 %
MAGNESIUM: 2.1 MG/DL (ref 1.8–2.4)
MCH RBC QN AUTO: 28.7 PG (ref 26–34)
MCHC RBC AUTO-ENTMCNC: 32.9 G/DL (ref 31–36)
MCV RBC AUTO: 87.3 FL (ref 80–100)
MONOCYTES ABSOLUTE: 0.6 K/UL (ref 0–1.3)
MONOCYTES RELATIVE PERCENT: 6.7 %
NEUTROPHILS ABSOLUTE: 7.9 K/UL (ref 1.7–7.7)
NEUTROPHILS RELATIVE PERCENT: 84.6 %
PDW BLD-RTO: 13.9 % (ref 12.4–15.4)
PERFORMED ON: ABNORMAL
PERFORMED ON: ABNORMAL
PERFORMED ON: NORMAL
PERFORMED ON: NORMAL
PLATELET # BLD: 295 K/UL (ref 135–450)
PMV BLD AUTO: 8.7 FL (ref 5–10.5)
POTASSIUM REFLEX MAGNESIUM: 3.4 MMOL/L (ref 3.5–5.1)
RBC # BLD: 5.14 M/UL (ref 4.2–5.9)
SODIUM BLD-SCNC: 133 MMOL/L (ref 136–145)
WBC # BLD: 9.3 K/UL (ref 4–11)

## 2020-10-06 PROCEDURE — 2580000003 HC RX 258: Performed by: PODIATRIST

## 2020-10-06 PROCEDURE — 6360000002 HC RX W HCPCS: Performed by: PODIATRIST

## 2020-10-06 PROCEDURE — 83735 ASSAY OF MAGNESIUM: CPT

## 2020-10-06 PROCEDURE — 7100000010 HC PHASE II RECOVERY - FIRST 15 MIN

## 2020-10-06 PROCEDURE — 6370000000 HC RX 637 (ALT 250 FOR IP): Performed by: PODIATRIST

## 2020-10-06 PROCEDURE — 1200000000 HC SEMI PRIVATE

## 2020-10-06 PROCEDURE — 78452 HT MUSCLE IMAGE SPECT MULT: CPT | Performed by: INTERNAL MEDICINE

## 2020-10-06 PROCEDURE — A9502 TC99M TETROFOSMIN: HCPCS | Performed by: INTERNAL MEDICINE

## 2020-10-06 PROCEDURE — 3430000000 HC RX DIAGNOSTIC RADIOPHARMACEUTICAL: Performed by: INTERNAL MEDICINE

## 2020-10-06 PROCEDURE — 6360000002 HC RX W HCPCS: Performed by: INTERNAL MEDICINE

## 2020-10-06 PROCEDURE — 80048 BASIC METABOLIC PNL TOTAL CA: CPT

## 2020-10-06 PROCEDURE — 6370000000 HC RX 637 (ALT 250 FOR IP): Performed by: INTERNAL MEDICINE

## 2020-10-06 PROCEDURE — 99233 SBSQ HOSP IP/OBS HIGH 50: CPT | Performed by: INTERNAL MEDICINE

## 2020-10-06 PROCEDURE — 85025 COMPLETE CBC W/AUTO DIFF WBC: CPT

## 2020-10-06 PROCEDURE — 93017 CV STRESS TEST TRACING ONLY: CPT | Performed by: INTERNAL MEDICINE

## 2020-10-06 PROCEDURE — 94760 N-INVAS EAR/PLS OXIMETRY 1: CPT

## 2020-10-06 PROCEDURE — 6370000000 HC RX 637 (ALT 250 FOR IP): Performed by: PHYSICIAN ASSISTANT

## 2020-10-06 PROCEDURE — 99232 SBSQ HOSP IP/OBS MODERATE 35: CPT | Performed by: INTERNAL MEDICINE

## 2020-10-06 PROCEDURE — 36415 COLL VENOUS BLD VENIPUNCTURE: CPT

## 2020-10-06 PROCEDURE — 93005 ELECTROCARDIOGRAM TRACING: CPT | Performed by: INTERNAL MEDICINE

## 2020-10-06 RX ORDER — LINEZOLID 600 MG/1
600 TABLET, FILM COATED ORAL EVERY 12 HOURS SCHEDULED
Status: DISCONTINUED | OUTPATIENT
Start: 2020-10-06 | End: 2020-10-07

## 2020-10-06 RX ORDER — CIPROFLOXACIN 500 MG/1
500 TABLET, FILM COATED ORAL EVERY 12 HOURS SCHEDULED
Status: DISCONTINUED | OUTPATIENT
Start: 2020-10-06 | End: 2020-10-07

## 2020-10-06 RX ADMIN — METOPROLOL TARTRATE 50 MG: 50 TABLET, FILM COATED ORAL at 12:22

## 2020-10-06 RX ADMIN — ENOXAPARIN SODIUM 40 MG: 40 INJECTION SUBCUTANEOUS at 12:22

## 2020-10-06 RX ADMIN — LINEZOLID 600 MG: 600 TABLET, FILM COATED ORAL at 22:25

## 2020-10-06 RX ADMIN — HYDROCHLOROTHIAZIDE 25 MG: 25 TABLET ORAL at 12:22

## 2020-10-06 RX ADMIN — METOPROLOL TARTRATE 50 MG: 50 TABLET, FILM COATED ORAL at 22:25

## 2020-10-06 RX ADMIN — TETROFOSMIN 10 MILLICURIE: 1.38 INJECTION, POWDER, LYOPHILIZED, FOR SOLUTION INTRAVENOUS at 09:25

## 2020-10-06 RX ADMIN — TETROFOSMIN 30 MILLICURIE: 1.38 INJECTION, POWDER, LYOPHILIZED, FOR SOLUTION INTRAVENOUS at 11:07

## 2020-10-06 RX ADMIN — NIFEDIPINE 60 MG: 30 TABLET, FILM COATED, EXTENDED RELEASE ORAL at 12:21

## 2020-10-06 RX ADMIN — METFORMIN HYDROCHLORIDE 1000 MG: 500 TABLET ORAL at 12:22

## 2020-10-06 RX ADMIN — CIPROFLOXACIN 500 MG: 500 TABLET, FILM COATED ORAL at 22:26

## 2020-10-06 RX ADMIN — LOSARTAN POTASSIUM 100 MG: 100 TABLET, FILM COATED ORAL at 12:22

## 2020-10-06 RX ADMIN — REGADENOSON 0.4 MG: 0.08 INJECTION, SOLUTION INTRAVENOUS at 10:20

## 2020-10-06 RX ADMIN — GLIPIZIDE 15 MG: 5 TABLET ORAL at 06:02

## 2020-10-06 RX ADMIN — CEFEPIME HYDROCHLORIDE 2 G: 2 INJECTION, POWDER, FOR SOLUTION INTRAVENOUS at 03:11

## 2020-10-06 RX ADMIN — LINEZOLID 600 MG: 600 INJECTION, SOLUTION INTRAVENOUS at 06:03

## 2020-10-06 RX ADMIN — METRONIDAZOLE 500 MG: 250 TABLET ORAL at 06:02

## 2020-10-06 RX ADMIN — INSULIN GLARGINE 15 UNITS: 100 INJECTION, SOLUTION SUBCUTANEOUS at 22:32

## 2020-10-06 ASSESSMENT — ENCOUNTER SYMPTOMS
ABDOMINAL PAIN: 0
SORE THROAT: 0
SHORTNESS OF BREATH: 0
BACK PAIN: 0
COUGH: 0
EYE DISCHARGE: 0
EYE REDNESS: 0
WHEEZING: 0
TROUBLE SWALLOWING: 0
RHINORRHEA: 0
CONSTIPATION: 0
DIARRHEA: 0
NAUSEA: 0

## 2020-10-06 ASSESSMENT — PAIN SCALES - GENERAL
PAINLEVEL_OUTOF10: 0

## 2020-10-06 NOTE — PROGRESS NOTES
Nutrition Assessment     Type and Reason for Visit: Initial, Wound, Patient Education    Nutrition Recommendations/Plan:   CCC diet education      Nutrition Assessment:  Pt admitted for osteomyelitis of rt foot. Pt with increased nutrient needs for wound healing s/p toe amputation. PO intake provides adequate protein to promote healing AEB po intake % of meals consumed. Written [address] Beng[place] guidelines left @ bedside as pt off unit. Will review prior to dc, as schedule permits. No further nutrition concerns @ this time.     Malnutrition Assessment:  Malnutrition Status: No malnutrition    Nutrition Related Findings: Glucose 220, active BS; +3 pitting edema RLE      Current Nutrition Therapies:    DIET CARB CONTROL; Carb Control: 3 carb choices (45 gms)/meal    Anthropometric Measures:  · Height: 5' 10\" (177.8 cm)  · Current Body Wt: 226 lb (102.5 kg)   · BMI: 32.4    Nutrition Diagnosis:   · Increased nutrient needs related to increase demand for energy/nutrients as evidenced by wounds      Nutrition Interventions:   Food and/or Nutrient Delivery:  Continue Current Diet  Nutrition Education/Counseling:  Education needed   Coordination of Nutrition Care:  Continued Inpatient Monitoring    Goals:  po intake to remain greater than 75% of meals with compliance on [facility] diet       Nutrition Monitoring and Evaluation:   Behavioral-Environmental Outcomes:      Food/Nutrient Intake Outcomes:  Food and Nutrient Intake  Physical Signs/Symptoms Outcomes:  Biochemical Data, Skin     Discharge Planning:          Electronically signed by Sha Hernandez RD, LD on 10/6/20 at 11:37 AM EDT    Contact: [phone]

## 2020-10-06 NOTE — PROGRESS NOTES
Shift assessment completed, pt is alert and oriented, independent in room, denies any other needs at this time, call light within reach. See flowsheets and LDA. Will monitor pt. The care plan and education has been reviewed and mutually agreed upon with the patient.

## 2020-10-06 NOTE — PROGRESS NOTES
[facility] PROGRESS NOTE    10/6/2020 12:17 PM        Name: Brendon Cannon Admitted: 10/1/2020  Primary Care Provider: No primary care provider on file. (Tel: None)    Brief Course: Patient is a 76-year-old man with no prior known history of chronic illness. He presents to the emergency room for evaluation of increased pain, swelling and erythema of his right forefoot. In addition, evaluation revealed previously undiagnosed hypertension and diabetes mellitus. He was admitted under hospitalist service for further evaluation and treatment. Podiatry was involved due to concern for abscess and osteomyelitis. MRI on 10/3 showed right foot osteomyelitis. ID service was consulted. 2D echocardiogram done on 10/3 showed severe left ventricular hypertrophy, LVEF of 40 to 45%, global hypokinesis and septum with infiltrative process. Cardiology was consulted on 10/3. SHEBA performed 10/5, as per cardiology note no vegetation. Had R 3rd toe amp on 10/5. CC: pain, swelling and erythema of his right foot  Subjective: Patient reports improving pain in his right foot. BPs remain mildly elevated as well. Blood sugars are elevated. He had GXT this am. [place]  He is from [facility]. He recently started at a \"fabric job\"       Reviewed interval ancillary notes    Current Medications  insulin glargine (LANTUS;BASAGLAR) injection pen 15 Units, Nightly  hydroCHLOROthiazide (HYDRODIURIL) tablet 25 mg, Daily  potassium chloride (KLOR-CON M) extended release tablet 40 mEq, PRN    Or  potassium bicarb-citric acid (EFFER-K) effervescent tablet 40 mEq, PRN    Or  potassium chloride 10 mEq/100 mL IVPB (Peripheral Line), PRN  glipiZIDE (GLUCOTROL) tablet 15 mg, QAM AC  metoprolol tartrate (LOPRESSOR) tablet 50 mg, BID  labetalol (NORMODYNE;TRANDATE) injection 10 mg, Q4H PRN  NIFEdipine (PROCARDIA XL) extended release tablet 60 mg, Daily  insulin lispro (1 Unit Dial) 0-18 Units, TID WC  insulin lispro (1 Unit Dial) 0-9 Units, Nightly  metFORMIN (GLUCOPHAGE) tablet 1,000 mg, BID WC  perflutren lipid microspheres (DEFINITY) injection 1.65 mg, ONCE PRN  glucose (GLUTOSE) 40 % oral gel 15 g, PRN  dextrose 50 % IV solution, PRN  glucagon (rDNA) injection 1 mg, PRN  dextrose 5 % solution, PRN  losartan (COZAAR) tablet 100 mg, Daily  linezolid (ZYVOX) IVPB 600 mg, Q12H  metroNIDAZOLE (FLAGYL) tablet 500 mg, 3 times per day  sodium chloride flush 0.9 % injection 10 mL, 2 times per day  sodium chloride flush 0.9 % injection 10 mL, PRN  cefepime (MAXIPIME) 2 g IVPB minibag, Q12H  sodium chloride flush 0.9 % injection 10 mL, 2 times per day  sodium chloride flush 0.9 % injection 10 mL, PRN  acetaminophen (TYLENOL) tablet 650 mg, Q4H PRN    Or  acetaminophen (TYLENOL) suppository 650 mg, Q4H PRN  polyethylene glycol (GLYCOLAX) packet 17 g, Daily PRN  ondansetron (ZOFRAN) injection 4 mg, Q4H PRN  enoxaparin (LOVENOX) injection 40 mg, Daily        Objective:  BP (!) 163/93   Pulse 84   Temp 98.1 °F (36.7 °C) (Oral)   Resp 16   Ht 5' 10\" (1.778 m)   Wt 226 lb 8 oz (102.7 kg)   SpO2 96%   BMI 32.50 kg/m²     Intake/Output Summary (Last 24 hours) at 10/6/2020 1217  Last data filed at 10/5/2020 1544  Gross per 24 hour   Intake 1200 ml   Output 30 ml   Net 1170 ml      Wt Readings from Last 3 Encounters:   10/01/20 226 lb 8 oz (102.7 kg)     General appearance:Overweight  male,  Appears comfortable  ENT: Moist oral mucosa. Trachea midline, no adenopathy. Cardiovascular: Regular rhythm, normal S1, S2. No murmur. No edema in lower extremities  Respiratory: Not using accessory muscles. Good inspiratory effort. Clear to auscultation bilaterally, no wheeze or crackles. GI: Abdomen soft, no tenderness, not distended, normal bowel sounds  Musculoskeletal: No cyanosis in digits, neck supple  Psych: Normal affect.  Alert and oriented in time, place and person  Skin: Warm, dry, normal turgor, Right foot with deformity of toes, swelling and erythema, distal plantar aspect of his foot is very tender.   Extremity exam shows brisk capillary refill.  Peripheral pulses are palpable in lower extremities            Labs and Tests:  CBC:   Recent Labs     10/04/20  0529 10/05/20  0727 10/06/20  0806   WBC 6.9 7.0 9.3   HGB 13.3* 14.0 14.8    263 295     BMP:    Recent Labs     10/04/20  0529 10/05/20  0727 10/06/20  0805    135* 133*   K 3.5 3.4* 3.4*    98* 95*   CO2 27 28 27   BUN 10 7 14   CREATININE 0.8* 0.7* 0.8*   GLUCOSE 173* 179* 202*     Hepatic:   No results for input(s): AST, ALT, ALB, BILITOT, ALKPHOS in the last 72 hours. NM MYOCARDIAL SPECT REST EXERCISE OR RX   Final Result      XR FOOT RIGHT (MIN 3 VIEWS)   Final Result   No acute osseous abnormality of the right foot status post amputation of the   3rd toe. Scattered soft tissue gas with dorsal soft tissue swelling. MRI FOOT RIGHT W WO CONTRAST   Final Result   Cellulitis most prominent involving the 3rd digit where there is a soft   tissue ulceration along the dorsal aspect extending to the PIP joint. Early   erosive changes at the PIP joint and findings of osteomyelitis involving the   middle and proximal phalanx of the 3rd digit. No defined fluid collection. VL Extremity Venous Right   Final Result      XR FOOT RIGHT (MIN 3 VIEWS)   Final Result   No acute osseous abnormality right foot. Soft tissue edema presumably reflecting cellulitis, contusion and/or sprain. Follow-up imaging recommended if pain persists or worsens following   conservative management.            Problem List  Principal Problem:    Cellulitis of right foot  Active Problems:    Sepsis ([address]    Fever    Tachycardia    Neutrophilic leukocytosis    Poorly controlled type 2 diabetes mellitus (HCC)    Essential hypertension    Cellulitis of skin with lymphangitis Hyperglycemia    Acute hematogenous osteomyelitis of right foot ([facility]    Ex-smoker    Diabetic polyneuropathy associated with type 2 diabetes mellitus (HCC)    Class 1 obesity due to excess calories with body mass index (BMI) of 32.0 to 32.9 in adult    Diabetes education, encounter for  Resolved Problems:    * No resolved hospital problems. *       Assessment & Plan:     1. Right foot osteomyelitis:   Patient was started on IV vancomycin and cefepime on admission. Podiatry was consulted. Patient underwent MRI foot. ID was consulted after MRI foot showed osteomyelitis. Patient is currently on IV cefepime, IV linezolid and IV Flagyl. Patient had R 3rd toe amputation on Monday. Awaiting Pathology results. Abx per ID.      2. Chronic undiagnosed, untreated type-2 DM, complicated by neuropathy and foot osteomyelitis:   Hemoglobin A1c of 11.7. Patient does not have medical insurance. Unable to afford medications on his own. Currently on metformin 1 g p.o. twice daily and glipizide 10 mg p.o. daily. Will add 15 of lanuts.      3. Undiagnosed, untreated HTN:   Patient was initiated on losartan, metoprolol, and Procardia. Currently on losartan 100 mg p.o. daily and metoprolol 50 mg p.o. twice daily, procardia 60 mg. EKG and 2D echo suggestive of left ventricular hypertrophy from chronic untreated hypertension.     4. LVH, cardiomyopathy:   2D echocardiogram was done on 10/3 to evaluate cardiac function showed severe left ventricular hypertrophy with global hypokinesis, LVEF of 40 to 45%. Possible septal infiltrative process reported. Cardiology consulted. Patient had SHEBA negative for endocarditis but had dense septum. Having stress test today.  Likely from untreated htn.        IV Access: Peripheral IV  Guerin: No   Diet: DIET CARB CONTROL; Carb Control: 3 carb choices (45 gms)/meal  Diet NPO, After Midnight  Code:Full Code  DVT PPX Lovenox subcu daily  Disposition pending toe amputation on Monday, SHEBA as per cardiology service, final ID recommendations, social work consult since patient lacks health insurance, unable to afford medications on his own.     Constanza Jose PA-C   10/6/2020 12:17 PM

## 2020-10-06 NOTE — PROGRESS NOTES
Attempted to see patient for diabetes education yesterday & today. Surgery yesterday, now in stress lab. A1C 11.7. Will return as schedule permits.     Natalia Palmer, Diabetes Educator

## 2020-10-06 NOTE — PROGRESS NOTES
Instructed on Lexiscan Stress Test Procedure including possible side effects/ adverse reactions. Patient verbalizes  understanding and denies having any questions . See [address] [place] [address] Cardiology

## 2020-10-06 NOTE — PROGRESS NOTES
[address] [facility] Diabetes Education Nurse  Consult Note       NAME:  Kae Teague  MEDICAL RECORD NUMBER:  [id number]  AGE: 45 y.o. GENDER: male  : 1980  TODAY'S DATE:  10/6/2020    Subjective:     Reason for Educator consult ---  Evaluation and Assessment:    Kae Teague is a 45 y.o. male referred by:   [x] Physician  [] Nursing  [] Other:      Patient is newly diagnosed diabetic and will be discharged on insulin. Education completed with use of language line. Due to financial complications, patient to be discharged with Lantus and two oral hypoglycemics. Financial previously consulted. Patient's mother and father, now , and sister were all diagnosed diabetic. Patient reports sister very compliant will be a great support. Patient educated on how to use glucometer, diet, medications, Lantus administration, and necessity of compliance to deter long term complications of DM. Dietitian previously consulted. Explained A1C values and goals. Patient given booklet of written material regarding diabetes titled \" Living Well With Diabetes\" in [address] [place] [address]. PAST MEDICAL HISTORY  History reviewed. No pertinent past medical history. PAST SURGICAL HISTORY  Past Surgical History:   Procedure Laterality Date    TOE AMPUTATION Right 10/5/2020    RIGHT THIRD TOE AMPUTATION performed by Mikey Cruz DPM at [address] HISTORY  Family History   Problem Relation Age of Onset    Diabetes Mother     Diabetes Father     Diabetes Sister        SOCIAL HISTORY  Social History     Tobacco Use    Smoking status: Former Smoker     Packs/day: 0.25     Years: 10.00     Pack years: 2.50     Types: Cigarettes    Smokeless tobacco: Never Used   Substance Use Topics    Alcohol use: Yes     Alcohol/week: 3.0 standard drinks     Types: 3 Cans of beer per week    Drug use: Never       ALLERGIES  No Known Allergies    MEDICATIONS  No current facility-administered medications on file prior to encounter.       No current outpatient medications on file prior to encounter.        Objective:     LABS    CBC:   Lab Results   Component Value Date    WBC 9.3 10/06/2020    RBC 5.14 10/06/2020    HGB 14.8 10/06/2020    HCT 44.9 10/06/2020    MCV 87.3 10/06/2020    MCH 28.7 10/06/2020    MCHC 32.9 10/06/2020    RDW 13.9 10/06/2020     10/06/2020    MPV 8.7 10/06/2020     CMP:    Lab Results   Component Value Date     10/06/2020    K 3.4 10/06/2020    CL 95 10/06/2020    CO2 27 10/06/2020    BUN 14 10/06/2020    CREATININE 0.8 10/06/2020    GFRAA >60 10/06/2020    AGRATIO 0.6 10/01/2020    LABGLOM >60 10/06/2020    GLUCOSE 202 10/06/2020    PROT 7.3 10/01/2020    LABALBU 2.8 10/01/2020    CALCIUM 8.8 10/06/2020    BILITOT 0.7 10/01/2020    ALKPHOS 158 10/01/2020    AST 12 10/01/2020    ALT 15 10/01/2020       HgBA1c:    Lab Results   Component Value Date    LABA1C 11.7 10/02/2020     This patient's last creatinine was   Recent Labs     10/06/20  0805   CREATININE 0.8*        Recent Blood sugars have been   Lab Results   Component Value Date    POCGLU 101 10/06/2020    POCGLU 220 10/06/2020    POCGLU 250 10/05/2020    POCGLU 149 10/05/2020    POCGLU 136 10/05/2020    POCGLU 130 10/05/2020    POCGLU 160 10/05/2020    POCGLU 161 10/04/2020     Lab Results   Component Value Date    GLUCOSE 202 10/06/2020    GLUCOSE 179 10/05/2020    GLUCOSE 173 10/04/2020    GLUCOSE 206 10/03/2020    GLUCOSE 264 10/02/2020    GLUCOSE 277 10/01/2020            Assessment:     Patient Active Problem List   Diagnosis    Cellulitis of right foot    [facility]epsis ([facility]    Fever    Tachycardia    Neutrophilic leukocytosis    Poorly controlled type 2 diabetes mellitus ([facility]    Essential hypertension    Cellulitis of skin with lymphangitis    Hyperglycemia    Acute hematogenous osteomyelitis of right foot ([facility]    Ex-smoker    Diabetic polyneuropathy associated with type 2 diabetes mellitus (HCC)    Class 1 obesity due to excess calories with body mass index (BMI) of 32.0 to 32.9 in adult    Diabetes education, encounter for       Plan:     Plan of Care:   Admit with cellulitis  Diabetes Type 2 controlled. Patient will require prescription assistance at discharge. Financial has been consulted and preliminary message in sticky notes stating will potentially qualify for 100%. Nursing to continue to teach insulin injections while hospitalized. Continue to monitor blood sugars to determine adequacy of current dosages    Discharge Plan:  Patient will need insulin for home which will be new: suggest full basal bolus insulin therapy, due to financial constraints, lantus plus two hypoglycemics  Patient to f/u with PCP- will need list of clinics for uninsured patients. Instructed to log blood sugars and take blood sugar log to his f/u appointment.

## 2020-10-06 NOTE — PROGRESS NOTES
Podiatric Surgery Daily Progress Note  [facility]  Subjective :   Patient seen and examined this am at the bedside. Patient denies any acute overnight events. Patient denies N/V/F/C/SOB. Patient denies calf pain, thigh pain, chest pain. Review of Systems: A 12 point review of symptoms is unremarkable with the exception of the chief complaint. Patient specifically denies nausea, fever, vomiting, chills, shortness of breath, chest pain, abdominal pain, constipation or difficulty urinating. Objective     BP (!) 168/115   Pulse 79   Temp 98 °F (36.7 °C) (Oral)   Resp 16   Ht 5' 10\" (1.778 m)   Wt 226 lb 8 oz (102.7 kg)   SpO2 95%   BMI 32.50 kg/m²      I/O:    Intake/Output Summary (Last 24 hours) at 10/6/2020 1235  Last data filed at 10/5/2020 1544  Gross per 24 hour   Intake 1200 ml   Output 30 ml   Net 1170 ml              Wt Readings from Last 3 Encounters:   10/01/20 226 lb 8 oz (102.7 kg)       LABS:    Recent Labs     10/05/20  0727 10/06/20  0806   WBC 7.0 9.3   HGB 14.0 14.8   HCT 41.8 44.9    295        Recent Labs     10/06/20  0805   *   K 3.4*   CL 95*   CO2 27   BUN 14   CREATININE 0.8*      No results for input(s): PROT, INR, APTT in the last 72 hours. LOWER EXTREMITY EXAMINATION    Dressing intact to the RLE. CFT is brisk to the digits of the foot. Dressing will remain intact      ASSESSMENT/PLAN  1. Cellulitis, right foot  2. Full-thickness ulceration with osteomyelitis, right 3rd digit - s/p amputation of 3rd digit DOS 10/5/20  3. Full-thickness ulceration, left 3rd digit  4. Hammertoe deformities, digits 2-5 b/l  5. Diabetes mellitus with peripheral neuropathy - new onset, hemoglobin A1c of 11.7%     - Patient was seen and examined at bedside this am  - s/p amputation of the right 3rd digit. - Clearance fragment taken of the 3rd metatarsal head to determine whether resection was definitive.  Will follow up results  - Labs reviewed: leukcytosis on admission has resolved  - Agree with IV antibiotics     DISPO: s/p right 3rd digit amputation. Clearance fragment taken of the 3rd metatarsal head. Will follow up results. No further intervention planned from a podiatry standpoint. Patient will follow up in my office upon discharge.      Donato Langley DPM  [phone]

## 2020-10-06 NOTE — PROGRESS NOTES
diagnosis  Plan Per med  *HTN  Status New diagnosis, likely longstanding  Plan Titrate antihypertensives to systolic <587

## 2020-10-07 LAB
ANION GAP SERPL CALCULATED.3IONS-SCNC: 9 MMOL/L (ref 3–16)
BASOPHILS ABSOLUTE: 0.1 K/UL (ref 0–0.2)
BASOPHILS RELATIVE PERCENT: 0.8 %
BUN BLDV-MCNC: 14 MG/DL (ref 7–20)
CALCIUM SERPL-MCNC: 8.6 MG/DL (ref 8.3–10.6)
CHLORIDE BLD-SCNC: 98 MMOL/L (ref 99–110)
CO2: 29 MMOL/L (ref 21–32)
CREAT SERPL-MCNC: 0.8 MG/DL (ref 0.9–1.3)
EKG ATRIAL RATE: 86 BPM
EKG DIAGNOSIS: NORMAL
EKG P AXIS: 38 DEGREES
EKG P-R INTERVAL: 166 MS
EKG Q-T INTERVAL: 376 MS
EKG QRS DURATION: 112 MS
EKG QTC CALCULATION (BAZETT): 449 MS
EKG R AXIS: -81 DEGREES
EKG T AXIS: 39 DEGREES
EKG VENTRICULAR RATE: 86 BPM
EOSINOPHILS ABSOLUTE: 0.2 K/UL (ref 0–0.6)
EOSINOPHILS RELATIVE PERCENT: 2.9 %
GFR AFRICAN AMERICAN: >60
GFR NON-AFRICAN AMERICAN: >60
GLUCOSE BLD-MCNC: 111 MG/DL (ref 70–99)
GLUCOSE BLD-MCNC: 74 MG/DL (ref 70–99)
GLUCOSE BLD-MCNC: 77 MG/DL (ref 70–99)
GLUCOSE BLD-MCNC: 93 MG/DL (ref 70–99)
GLUCOSE BLD-MCNC: 94 MG/DL (ref 70–99)
HCT VFR BLD CALC: 41.5 % (ref 40.5–52.5)
HEMOGLOBIN: 14.2 G/DL (ref 13.5–17.5)
LYMPHOCYTES ABSOLUTE: 2.1 K/UL (ref 1–5.1)
LYMPHOCYTES RELATIVE PERCENT: 27.2 %
MAGNESIUM: 2 MG/DL (ref 1.8–2.4)
MCH RBC QN AUTO: 29.7 PG (ref 26–34)
MCHC RBC AUTO-ENTMCNC: 34.1 G/DL (ref 31–36)
MCV RBC AUTO: 86.9 FL (ref 80–100)
MONOCYTES ABSOLUTE: 0.7 K/UL (ref 0–1.3)
MONOCYTES RELATIVE PERCENT: 9.2 %
NEUTROPHILS ABSOLUTE: 4.6 K/UL (ref 1.7–7.7)
NEUTROPHILS RELATIVE PERCENT: 59.9 %
PDW BLD-RTO: 13.9 % (ref 12.4–15.4)
PERFORMED ON: NORMAL
PLATELET # BLD: 297 K/UL (ref 135–450)
PMV BLD AUTO: 8.1 FL (ref 5–10.5)
POTASSIUM REFLEX MAGNESIUM: 2.9 MMOL/L (ref 3.5–5.1)
POTASSIUM SERPL-SCNC: 3.6 MMOL/L (ref 3.5–5.1)
RBC # BLD: 4.78 M/UL (ref 4.2–5.9)
SODIUM BLD-SCNC: 136 MMOL/L (ref 136–145)
WBC # BLD: 7.7 K/UL (ref 4–11)

## 2020-10-07 PROCEDURE — 80048 BASIC METABOLIC PNL TOTAL CA: CPT

## 2020-10-07 PROCEDURE — 6360000002 HC RX W HCPCS: Performed by: PODIATRIST

## 2020-10-07 PROCEDURE — 2580000003 HC RX 258: Performed by: PODIATRIST

## 2020-10-07 PROCEDURE — 99232 SBSQ HOSP IP/OBS MODERATE 35: CPT | Performed by: INTERNAL MEDICINE

## 2020-10-07 PROCEDURE — 1200000000 HC SEMI PRIVATE

## 2020-10-07 PROCEDURE — 85025 COMPLETE CBC W/AUTO DIFF WBC: CPT

## 2020-10-07 PROCEDURE — 93010 ELECTROCARDIOGRAM REPORT: CPT | Performed by: INTERNAL MEDICINE

## 2020-10-07 PROCEDURE — 6370000000 HC RX 637 (ALT 250 FOR IP): Performed by: PHYSICIAN ASSISTANT

## 2020-10-07 PROCEDURE — 6370000000 HC RX 637 (ALT 250 FOR IP): Performed by: PODIATRIST

## 2020-10-07 PROCEDURE — 84132 ASSAY OF SERUM POTASSIUM: CPT

## 2020-10-07 PROCEDURE — 6370000000 HC RX 637 (ALT 250 FOR IP): Performed by: INTERNAL MEDICINE

## 2020-10-07 PROCEDURE — 99233 SBSQ HOSP IP/OBS HIGH 50: CPT | Performed by: INTERNAL MEDICINE

## 2020-10-07 PROCEDURE — 83735 ASSAY OF MAGNESIUM: CPT

## 2020-10-07 PROCEDURE — 36415 COLL VENOUS BLD VENIPUNCTURE: CPT

## 2020-10-07 PROCEDURE — 6360000002 HC RX W HCPCS: Performed by: INTERNAL MEDICINE

## 2020-10-07 RX ORDER — CEPHALEXIN 250 MG/1
500 CAPSULE ORAL EVERY 6 HOURS SCHEDULED
Status: DISCONTINUED | OUTPATIENT
Start: 2020-10-07 | End: 2020-10-08 | Stop reason: HOSPADM

## 2020-10-07 RX ORDER — CARVEDILOL 6.25 MG/1
12.5 TABLET ORAL 2 TIMES DAILY WITH MEALS
Status: DISCONTINUED | OUTPATIENT
Start: 2020-10-07 | End: 2020-10-08 | Stop reason: HOSPADM

## 2020-10-07 RX ORDER — POTASSIUM CHLORIDE 7.45 MG/ML
10 INJECTION INTRAVENOUS
Status: DISPENSED | OUTPATIENT
Start: 2020-10-07 | End: 2020-10-07

## 2020-10-07 RX ADMIN — LINEZOLID 600 MG: 600 TABLET, FILM COATED ORAL at 08:17

## 2020-10-07 RX ADMIN — CARVEDILOL 12.5 MG: 6.25 TABLET, FILM COATED ORAL at 17:36

## 2020-10-07 RX ADMIN — POTASSIUM CHLORIDE 10 MEQ: 7.46 INJECTION, SOLUTION INTRAVENOUS at 11:06

## 2020-10-07 RX ADMIN — LOSARTAN POTASSIUM 100 MG: 100 TABLET, FILM COATED ORAL at 08:18

## 2020-10-07 RX ADMIN — HYDROCHLOROTHIAZIDE 25 MG: 25 TABLET ORAL at 08:17

## 2020-10-07 RX ADMIN — NIFEDIPINE 60 MG: 30 TABLET, FILM COATED, EXTENDED RELEASE ORAL at 08:17

## 2020-10-07 RX ADMIN — CEPHALEXIN 500 MG: 250 CAPSULE ORAL at 15:34

## 2020-10-07 RX ADMIN — Medication 10 ML: at 21:25

## 2020-10-07 RX ADMIN — INSULIN GLARGINE 15 UNITS: 100 INJECTION, SOLUTION SUBCUTANEOUS at 21:26

## 2020-10-07 RX ADMIN — POTASSIUM CHLORIDE 10 MEQ: 7.46 INJECTION, SOLUTION INTRAVENOUS at 08:18

## 2020-10-07 RX ADMIN — METFORMIN HYDROCHLORIDE 1000 MG: 500 TABLET ORAL at 08:18

## 2020-10-07 RX ADMIN — METFORMIN HYDROCHLORIDE 1000 MG: 500 TABLET ORAL at 17:36

## 2020-10-07 RX ADMIN — ENOXAPARIN SODIUM 40 MG: 40 INJECTION SUBCUTANEOUS at 08:07

## 2020-10-07 RX ADMIN — METOPROLOL TARTRATE 50 MG: 50 TABLET, FILM COATED ORAL at 08:17

## 2020-10-07 RX ADMIN — CEPHALEXIN 500 MG: 250 CAPSULE ORAL at 23:34

## 2020-10-07 RX ADMIN — Medication 10 ML: at 08:20

## 2020-10-07 RX ADMIN — GLIPIZIDE 15 MG: 5 TABLET ORAL at 05:44

## 2020-10-07 RX ADMIN — CIPROFLOXACIN 500 MG: 500 TABLET, FILM COATED ORAL at 08:17

## 2020-10-07 RX ADMIN — POTASSIUM CHLORIDE 40 MEQ: 1500 TABLET, EXTENDED RELEASE ORAL at 12:18

## 2020-10-07 ASSESSMENT — ENCOUNTER SYMPTOMS
ABDOMINAL PAIN: 0
COUGH: 0
RHINORRHEA: 0
NAUSEA: 0
TROUBLE SWALLOWING: 0
EYE REDNESS: 0
SHORTNESS OF BREATH: 0
SORE THROAT: 0
BACK PAIN: 0
CONSTIPATION: 0
DIARRHEA: 0
WHEEZING: 0
EYE DISCHARGE: 0

## 2020-10-07 ASSESSMENT — PAIN SCALES - GENERAL
PAINLEVEL_OUTOF10: 0

## 2020-10-07 NOTE — PROGRESS NOTES
person  Skin: Warm, dry, normal turgor, Right foot with deformity of toes, swelling and erythema, distal plantar aspect of his foot is very tender.   Extremity exam shows brisk capillary refill.  Peripheral pulses are palpable in lower extremities            Labs and Tests:  CBC:   Recent Labs     10/05/20  0727 10/06/20  0806 10/07/20  0640   WBC 7.0 9.3 7.7   HGB 14.0 14.8 14.2    295 297     BMP:    Recent Labs     10/05/20  0727 10/06/20  0805 10/07/20  0640   * 133* 136   K 3.4* 3.4* 2.9*   CL 98* 95* 98*   CO2 28 27 29   BUN 7 14 14   CREATININE 0.7* 0.8* 0.8*   GLUCOSE 179* 202* 111*     Hepatic:   No results for input(s): AST, ALT, ALB, BILITOT, ALKPHOS in the last 72 hours. NM MYOCARDIAL SPECT REST EXERCISE OR RX   Final Result      XR FOOT RIGHT (MIN 3 VIEWS)   Final Result   No acute osseous abnormality of the right foot status post amputation of the   3rd toe. Scattered soft tissue gas with dorsal soft tissue swelling. MRI FOOT RIGHT W WO CONTRAST   Final Result   Cellulitis most prominent involving the 3rd digit where there is a soft   tissue ulceration along the dorsal aspect extending to the PIP joint. Early   erosive changes at the PIP joint and findings of osteomyelitis involving the   middle and proximal phalanx of the 3rd digit. No defined fluid collection. VL Extremity Venous Right   Final Result      XR FOOT RIGHT (MIN 3 VIEWS)   Final Result   No acute osseous abnormality right foot. Soft tissue edema presumably reflecting cellulitis, contusion and/or sprain. Follow-up imaging recommended if pain persists or worsens following   conservative management. GXT   Summary     Small sized anterior, and infero-basal minimally reversible defect of     moderate intensity. Dilated left ventricular with mildly reduced ejection     fraction at 44 %.        Problem List  Principal Problem:    Cellulitis of right foot  Active Problems:    Sepsis ([address] Fever    Tachycardia    Neutrophilic leukocytosis    Poorly controlled type 2 diabetes mellitus (HCC)    Essential hypertension    Cellulitis of skin with lymphangitis    Hyperglycemia    Acute hematogenous osteomyelitis of right foot ([address]    Ex-smoker    Diabetic polyneuropathy associated with type 2 diabetes mellitus (HCC)    Class 1 obesity due to excess calories with body mass index (BMI) of 32.0 to 32.9 in adult    Weight loss counseling, encounter for  Resolved Problems:    * No resolved hospital problems. *       Assessment & Plan:     1. Right foot osteomyelitis:   Patient was started on IV vancomycin and cefepime on admission. Podiatry was consulted. Patient underwent MRI foot. ID was consulted after MRI foot showed osteomyelitis. Patient is currently on IV cefepime, IV linezolid and IV Flagyl. Patient had R 3rd toe amputation on Monday. Awaiting Pathology results. Abx per ID.      2. Chronic undiagnosed, untreated type-2 DM, complicated by neuropathy and foot osteomyelitis:   Hemoglobin A1c of 11.7. Patient does not have medical insurance. Unable to afford medications on his own. Currently on metformin 1 g p.o. twice daily and glipizide 10 mg p.o. daily. Will add 15 of lanuts.      3. Undiagnosed, untreated HTN:   Patient was initiated on losartan, metoprolol, and Procardia. Currently on losartan 100 mg p.o. daily and metoprolol 50 mg p.o. twice daily, procardia 60 mg. EKG and 2D echo suggestive of left ventricular hypertrophy from chronic untreated hypertension. Change metoprolol to coreg.      4. Abnormal stress test-minimally reversible defect on GXT-? Cath. Cardiology to follow up today. 5. LVH, cardiomyopathy:   2D echocardiogram was done on 10/3 to evaluate cardiac function showed severe left ventricular hypertrophy with global hypokinesis, LVEF of 40 to 45%. Possible septal infiltrative process reported. Cardiology consulted.   Patient had SHEBA negative for endocarditis but had dense septum. Likely from untreated htn.        IV Access: Peripheral IV  Guerin: No   Diet: DIET CARB CONTROL; Carb Control: 3 carb choices (45 gms)/meal  Diet NPO, After Midnight  Code:Full Code  DVT PPX Lovenox subcu daily  Disposition: dc when path comes back and possible fermin Pressley PA-C   10/7/2020 1:57 PM

## 2020-10-07 NOTE — PROGRESS NOTES
Shift assessment and AM vitals complete, VSS. AM meds given. Pt was educate per night shift on how to do insulin injections. Will educate if needed. Talked to SW and they can get pt insulin by Nov. 4th. We will see if we can get him sent with a supply of insulin to go home with until then. Will check with Hospitalist and pharmacy. Pt foot dressing is CDI and pain is controlled. Pt needs K+ replacement, will give protocol and re check. The care plan and education has been reviewed and mutually agreed upon with the patient.

## 2020-10-07 NOTE — PROGRESS NOTES
[facility]  Daily Progress Note    Admit Date:  10/1/2020      CC: CM. Osteo, dm, htn  Subj: Stress with mostly fixed defect. Defer cath until after recovers from foot surgery and infection.         Obj:   /88   Pulse 78   Temp 98.1 °F (36.7 °C) (Oral)   Resp 16   Ht 5' 10\" (1.778 m)   Wt 226 lb 8 oz (102.7 kg)   SpO2 96%   BMI 32.50 kg/m²     No intake or output data in the 24 hours ending 10/07/20 1708  Gen Alert, coop, no distress Heart  RRR, no MRG, nl apical impulse   Head NC, AT, no abnorm Abd  Soft, NT, +BS, no mass, no OM   Eyes PERRLA, conj/corn clear Ext  def   Nose Nares nl, no drain, NT Pulse decr   Throat Lips, mucosa, tongue nl Skin Color/text/turg nl, no rash/lesions   Neck S/S, TM, NT, no bruit/JVD Psych Nl mood and affect   Lung CTA-B, unlabored, no DTP Lymph   No cervical or axillary LA   Ch wall NT, no deform Neuro  Nl gross M/S exam     Medications:    carvedilol  12.5 mg Oral BID WC    cephALEXin  500 mg Oral 4 times per day    insulin glargine  15 Units Subcutaneous Nightly    hydroCHLOROthiazide  25 mg Oral Daily    glipiZIDE  15 mg Oral QAM AC    NIFEdipine  60 mg Oral Daily    insulin lispro  0-18 Units Subcutaneous TID WC    insulin lispro  0-9 Units Subcutaneous Nightly    metFORMIN  1,000 mg Oral BID WC    losartan  100 mg Oral Daily    sodium chloride flush  10 mL Intravenous 2 times per day    sodium chloride flush  10 mL Intravenous 2 times per day    enoxaparin  40 mg Subcutaneous Daily      dextrose       Lab Data: Relevant and available CV data reviewed    ASSESSMENT AND PLAN     *Cardiomyopathy  Status New, incidental finding on echo, may be due to uncontrolled HTN  Plan SHEBA with fairly dense septum, LVH noted, EF reduced, no endocarditis   Suspect CM related to uncontrolled longstanding HTN   No evidence for ischemia on stress, outpatient continued workup after recovers from foot surgery/infxn  *Osteo  Per med/surgery  *DM  Status New diagnosis  Plan Per med  *HTN  Status New diagnosis, likely longstanding  Plan Titrate antihypertensives to systolic <447    Call with questions

## 2020-10-07 NOTE — PROGRESS NOTES
Shift assessment completed, pt is alert and oriented, VSS. Independent in room, call light within reach, pt demonstrates how to administer insulin, does well, denies any needs at this time. See flowsheets. Will monitor. The care plan and education has been reviewed and mutually agreed upon with the patient.

## 2020-10-07 NOTE — PROGRESS NOTES
Infectious Diseases   Progress Note      Admission Date: 10/1/2020  Hospital Day: Hospital Day: 7   Attending: Maya Neil MD  Date of service: 10/7/2020     Chief complaint/ Reason for consult:     · Complicated right diabetic foot infection with osteomyelitis of the right third toe  · Fever and bandemia on admission  · Poorly controlled newly diagnosed diabetes mellitus  · Diabetic polyneuropathy    Microbiology:        I have reviewed allavailable micro lab data and cultures    · Blood culture (2/2) - collected on 10/1/2020: Negative      Antibiotics and immunizations:       Current antibiotics: All antibiotics and their doses were reviewed by me    Recent Abx Admin                   ciprofloxacin (CIPRO) tablet 500 mg (mg) 500 mg Given 10/07/20 0817     500 mg Given 10/06/20 2226    linezolid (ZYVOX) tablet 600 mg (mg) 600 mg Given 10/07/20 0817     600 mg Given 10/06/20 2225                  Immunization History: All immunization history was reviewed by me today. Immunization History   Administered Date(s) Administered    Influenza, Quadv, IM, PF (6 mo and older Fluzone, Flulaval, Fluarix, and 3 yrs and older Afluria) 10/02/2020       Known drug allergies: All allergies were reviewed and updated    No Known Allergies    Social history:     Social History:  All social andepidemiologic history was reviewed and updated by me today as needed. · Tobacco use:   reports that he has quit smoking. His smoking use included cigarettes. He has a 2.50 pack-year smoking history. He has never used smokeless tobacco.  · Alcohol use:   reports current alcohol use of about 3.0 standard drinks of alcohol per week. · [facility]  ·  reports no history of drug use. Assessment:     The patient is a 45 y.o. old male who  has no past medical history on file.  with following problems:    · Complicated right diabetic foot infection with osteomyelitis of the right third toe-culture has grown Streptococcus agalactiae  · Fever and bandemia on admission-this is resolved  · Poorly controlled newly diagnosed diabetes mellitus-diabetes counseling done  · Diabetic polyneuropathy  · Essential hypertension-blood pressure okay  · Ex-smoker  · Obesity Class 1 due to excess calorie intake : Body mass index is 32.5 kg/m². ·       Discussion:      The patient is on oral linezolid and ciprofloxacin. He is tolerating the antibiotics okay. Surgical culture has grown Streptococcus agalactiae. The patient had a transesophageal echo done on 10/5/2020. Endocarditis was ruled out on that. Plan:     Diagnostic Workup:    · Continue to follow  fever curve, WBC count and blood cultures  · Follow up on liver and renal function    Antimicrobials:    · Will stop linezolid today  · We will stop ciprofloxacin today  · Will order p.o. Keflex 500 mg every 6 hours  · Clearance fragment path report indicates clear margin, will recommend a 10-day course of oral Keflex  · Right foot surgical site care  · Maintain good glycemic control  · DVT prophylaxis  · Discussed all above with patient and RN      Drug Monitoring:    · Continue monitoring for antibiotic toxicity as follows: CBC, CMP  · Continue to watch for following: new or worsening fever, new hypotension, hives, lip swelling and redness or purulence at vascular access sites. I/v access Management:    · Continue to monitor i.v access sites for erythema, induration, discharge or tenderness. · As always, continue efforts to minimize tubes/lines/drains as clinically appropriate to reduce chances of line associated infections. Patient education and counseling:       · The patient was educated in detail about the side-effects of various antibiotics and things to watch for like new rashes, lip swelling, severe reaction, worsening diarrhea, break through fever etc.  · Discussed patient's condition and what to expect.  All of the patient's questions were addressed in a satisfactory manner and patient verbalized understanding all instructions. Smoking cessation/ Tobacco use disorder counseling:    I have counseled the patient extensively about risks of smoking and have encouraged the patient to maintain a healthy smoke-free lifestyle. Information was given about various smoking cessation programs and their websites like www.smokefree.gov, [place]. quitlogix. org as well as help lines like 1-028-QUIT-NOW and 1-896SafeOp SurgicalLUNGSafeOp SurgicalUSA. TIME SPENT TODAY:     - Spent over  35 minutes on visit (including interval history, physical exam, review of data including labs, cultures, imaging, development and implementation of treatment plan and coordination of complex care). Thanks for allowing me to participate in your patient's care. I will sign off today, but will be available to answer any further questions or concerns that may arise during patient's stay in the hospital.      Subjective: Interval history: Interval history was obtained from chart review and RN. The patient is afebrile. Tolerating antibiotic okay. No diarrhea     REVIEW OF SYSTEMS:      Review of Systems   Constitutional: Negative for chills, diaphoresis and fever. HENT: Negative for ear discharge, ear pain, rhinorrhea, sore throat and trouble swallowing. Eyes: Negative for discharge and redness. Respiratory: Negative for cough, shortness of breath and wheezing. Cardiovascular: Negative for chest pain and leg swelling. Gastrointestinal: Negative for abdominal pain, constipation, diarrhea and nausea. Endocrine: Negative for polyuria. Genitourinary: Negative for dysuria, flank pain, frequency, hematuria and urgency. Musculoskeletal: Negative for back pain and myalgias. Skin: Negative for rash. Neurological: Negative for dizziness, seizures and headaches. Hematological: Does not bruise/bleed easily. Psychiatric/Behavioral: Negative for hallucinations and suicidal ideas.    All other systems reviewed and are negative. Past Medical History: All past medical history reviewed today. History reviewed. No pertinent past medical history. Past Surgical History: All past surgical history was reviewed today. Past Surgical History:   Procedure Laterality Date    TOE AMPUTATION Right 10/5/2020    RIGHT THIRD TOE AMPUTATION performed by Maria D Garcia DPM at [address] [facility]       Family History: All family history was reviewed today. Problem Relation Age of Onset    Diabetes Mother     Diabetes Father     Diabetes Sister        Objective:       PHYSICAL EXAM:      Vitals:   Vitals:    10/07/20 0115 10/07/20 0511 10/07/20 0745 10/07/20 1258   BP: 124/81 136/88 (!) 146/100 (!) 141/95   Pulse: 71 73 80 82   Resp: 16 16 16 16   Temp: 98.1 °F (36.7 °C) 98.1 °F (36.7 °C) 98.5 °F (36.9 °C) 97.9 °F (36.6 °C)   TempSrc: Oral Oral Oral Oral   SpO2: 95% 95% 96% 97%   Weight:       Height:           Physical Exam  Vitals signs and nursing note reviewed. Constitutional:       Appearance: Normal appearance. He is well-developed. HENT:      Head: Normocephalic and atraumatic. Right Ear: External ear normal.      Left Ear: External ear normal.      Nose: Nose normal. No congestion or rhinorrhea. Mouth/Throat:      Mouth: Mucous membranes are moist.      Pharynx: No oropharyngeal exudate or posterior oropharyngeal erythema. Eyes:      General: No scleral icterus. Right eye: No discharge. Left eye: No discharge. Conjunctiva/sclera: Conjunctivae normal.      Pupils: Pupils are equal, round, and reactive to light. Neck:      Musculoskeletal: Normal range of motion and neck supple. No neck rigidity or muscular tenderness. Cardiovascular:      Rate and Rhythm: Normal rate and regular rhythm. Pulses: Normal pulses. Heart sounds: No murmur. No friction rub. Pulmonary:      Effort: Pulmonary effort is normal. No respiratory distress.       Breath sounds: Normal breath sounds. No stridor. No wheezing, rhonchi or rales. Abdominal:      General: Bowel sounds are normal.      Palpations: Abdomen is soft. Tenderness: There is no abdominal tenderness. There is no right CVA tenderness, left CVA tenderness, guarding or rebound. Musculoskeletal: Normal range of motion. General: No swelling or tenderness. Lymphadenopathy:      Cervical: No cervical adenopathy. Skin:     General: Skin is warm and dry. Coloration: Skin is not jaundiced. Findings: No erythema or rash. Comments:   Surgical dressing on the right foot   Neurological:      General: No focal deficit present. Mental Status: He is alert and oriented to person, place, and time. Mental status is at baseline. Motor: No abnormal muscle tone. Psychiatric:         Mood and Affect: Mood normal.         Behavior: Behavior normal.         Thought Content: Thought content normal.            Lines: All vascular access sites are healthy with no local erythema, discharge or tenderness. Intake and output:    No intake/output data recorded. Lab Data:   All available labs and old records have been reviewed by me.     CBC:  Recent Labs     10/05/20  0727 10/06/20  0806 10/07/20  0640   WBC 7.0 9.3 7.7   RBC 4.83 5.14 4.78   HGB 14.0 14.8 14.2   HCT 41.8 44.9 41.5    295 297   MCV 86.6 87.3 86.9   MCH 28.9 28.7 29.7   MCHC 33.4 32.9 34.1   RDW 13.8 13.9 13.9        BMP:  Recent Labs     10/05/20  0727 10/06/20  0805 10/07/20  0640   * 133* 136   K 3.4* 3.4* 2.9*   CL 98* 95* 98*   CO2 28 27 29   BUN 7 14 14   CREATININE 0.7* 0.8* 0.8*   CALCIUM 8.6 8.8 8.6   GLUCOSE 179* 202* 111*        Hepatic Function Panel:   Lab Results   Component Value Date    ALKPHOS 158 10/01/2020    ALT 15 10/01/2020    AST 12 10/01/2020    PROT 7.3 10/01/2020    BILITOT 0.7 10/01/2020    LABALBU 2.8 10/01/2020       CPK: No results found for: CKTOTAL  ESR:   Lab Results   Component Value Date SEDRATE 78 (H) 10/03/2020     CRP: No results found for: CRP        Imaging: All pertinent images and reports for the current visit were reviewed by me during this visit. NM MYOCARDIAL SPECT REST EXERCISE OR RX   Final Result      XR FOOT RIGHT (MIN 3 VIEWS)   Final Result   No acute osseous abnormality of the right foot status post amputation of the   3rd toe. Scattered soft tissue gas with dorsal soft tissue swelling. MRI FOOT RIGHT W WO CONTRAST   Final Result   Cellulitis most prominent involving the 3rd digit where there is a soft   tissue ulceration along the dorsal aspect extending to the PIP joint. Early   erosive changes at the PIP joint and findings of osteomyelitis involving the   middle and proximal phalanx of the 3rd digit. No defined fluid collection. VL Extremity Venous Right   Final Result      XR FOOT RIGHT (MIN 3 VIEWS)   Final Result   No acute osseous abnormality right foot. Soft tissue edema presumably reflecting cellulitis, contusion and/or sprain. Follow-up imaging recommended if pain persists or worsens following   conservative management. Medications: All current and past medications were reviewed.      potassium chloride  10 mEq Intravenous Q1H    carvedilol  12.5 mg Oral BID WC    insulin glargine  15 Units Subcutaneous Nightly    linezolid  600 mg Oral 2 times per day    ciprofloxacin  500 mg Oral 2 times per day    hydroCHLOROthiazide  25 mg Oral Daily    glipiZIDE  15 mg Oral QAM AC    NIFEdipine  60 mg Oral Daily    insulin lispro  0-18 Units Subcutaneous TID WC    insulin lispro  0-9 Units Subcutaneous Nightly    metFORMIN  1,000 mg Oral BID WC    losartan  100 mg Oral Daily    sodium chloride flush  10 mL Intravenous 2 times per day    sodium chloride flush  10 mL Intravenous 2 times per day    enoxaparin  40 mg Subcutaneous Daily        dextrose         potassium chloride **OR** potassium alternative oral replacement

## 2020-10-07 NOTE — CARE COORDINATION
Discharge Planning Assessment  Rn/SW discharge planner met w/patient/family member to discuss reason for admission, current living situation, and potential needs at the time of discharge. Demographics/Insurance verified Yes    Current type of dwelling:house    Level of function/support: independent     PCP:none    DME:none    Active with any community resources/agencies/skilled home care:n/a    Medication compliance issues:no PCP    Financial issues that could impact healthcare:no ins    Transportation at time of d/c (Discussed w/pt/family that on the day of discharge home, tentative time of discharge will be between 10am and noon): family    Discussed and provided facilities of choice if transition to a skilled nursing facility is required a the time of discharge-N/A. Tentative discharge plan: Met w/pt to offer assistance w/dc planning w/ [id number]. Informed pt that he will need to follow up w/PCP for new diabetic diagnosis. Pt understands. Presented pt w/[employer] brochure and offered to schedule appt. Pt stated that his sister brought in info on a PCP-Dr. Subha Barone. Pt asked for appt to be made w/this physician. Scheduled pt appt for 11/4/20 @130pm-this was the first avail appt. Informed pt of appt time and potential cost for visit.      Electronically signed by NASREEN Guerrero  [phone]

## 2020-10-07 NOTE — PROGRESS NOTES
Roxie Villagran NP talked with pt this afternoon and pt complaining some chest pain while IV K+ given. NP says to stop it right now, give PO K+ and re check K+ level in 4 hours.

## 2020-10-08 VITALS
HEART RATE: 79 BPM | DIASTOLIC BLOOD PRESSURE: 89 MMHG | WEIGHT: 226.5 LBS | HEIGHT: 70 IN | BODY MASS INDEX: 32.43 KG/M2 | RESPIRATION RATE: 16 BRPM | TEMPERATURE: 97.6 F | OXYGEN SATURATION: 98 % | SYSTOLIC BLOOD PRESSURE: 135 MMHG

## 2020-10-08 LAB
ANION GAP SERPL CALCULATED.3IONS-SCNC: 8 MMOL/L (ref 3–16)
BASOPHILS ABSOLUTE: 0.1 K/UL (ref 0–0.2)
BASOPHILS RELATIVE PERCENT: 0.8 %
BUN BLDV-MCNC: 13 MG/DL (ref 7–20)
CALCIUM SERPL-MCNC: 8.9 MG/DL (ref 8.3–10.6)
CHLORIDE BLD-SCNC: 99 MMOL/L (ref 99–110)
CO2: 30 MMOL/L (ref 21–32)
CREAT SERPL-MCNC: 0.7 MG/DL (ref 0.9–1.3)
EOSINOPHILS ABSOLUTE: 0.3 K/UL (ref 0–0.6)
EOSINOPHILS RELATIVE PERCENT: 3.7 %
GFR AFRICAN AMERICAN: >60
GFR NON-AFRICAN AMERICAN: >60
GLUCOSE BLD-MCNC: 115 MG/DL (ref 70–99)
GLUCOSE BLD-MCNC: 64 MG/DL (ref 70–99)
GLUCOSE BLD-MCNC: 92 MG/DL (ref 70–99)
GLUCOSE BLD-MCNC: 94 MG/DL (ref 70–99)
HCT VFR BLD CALC: 41.8 % (ref 40.5–52.5)
HEMOGLOBIN: 14.3 G/DL (ref 13.5–17.5)
LYMPHOCYTES ABSOLUTE: 1.7 K/UL (ref 1–5.1)
LYMPHOCYTES RELATIVE PERCENT: 25.4 %
MAGNESIUM: 2 MG/DL (ref 1.8–2.4)
MCH RBC QN AUTO: 29.8 PG (ref 26–34)
MCHC RBC AUTO-ENTMCNC: 34.2 G/DL (ref 31–36)
MCV RBC AUTO: 87.2 FL (ref 80–100)
MONOCYTES ABSOLUTE: 0.7 K/UL (ref 0–1.3)
MONOCYTES RELATIVE PERCENT: 10.7 %
NEUTROPHILS ABSOLUTE: 4 K/UL (ref 1.7–7.7)
NEUTROPHILS RELATIVE PERCENT: 59.4 %
PDW BLD-RTO: 14.2 % (ref 12.4–15.4)
PERFORMED ON: ABNORMAL
PERFORMED ON: NORMAL
PERFORMED ON: NORMAL
PLATELET # BLD: 303 K/UL (ref 135–450)
PMV BLD AUTO: 7.7 FL (ref 5–10.5)
POTASSIUM REFLEX MAGNESIUM: 3.4 MMOL/L (ref 3.5–5.1)
RBC # BLD: 4.79 M/UL (ref 4.2–5.9)
SODIUM BLD-SCNC: 137 MMOL/L (ref 136–145)
WBC # BLD: 6.7 K/UL (ref 4–11)

## 2020-10-08 PROCEDURE — 6370000000 HC RX 637 (ALT 250 FOR IP): Performed by: INTERNAL MEDICINE

## 2020-10-08 PROCEDURE — 99233 SBSQ HOSP IP/OBS HIGH 50: CPT | Performed by: NURSE PRACTITIONER

## 2020-10-08 PROCEDURE — 6370000000 HC RX 637 (ALT 250 FOR IP): Performed by: PODIATRIST

## 2020-10-08 PROCEDURE — 83735 ASSAY OF MAGNESIUM: CPT

## 2020-10-08 PROCEDURE — 80048 BASIC METABOLIC PNL TOTAL CA: CPT

## 2020-10-08 PROCEDURE — 85025 COMPLETE CBC W/AUTO DIFF WBC: CPT

## 2020-10-08 PROCEDURE — 6370000000 HC RX 637 (ALT 250 FOR IP): Performed by: PHYSICIAN ASSISTANT

## 2020-10-08 PROCEDURE — 94760 N-INVAS EAR/PLS OXIMETRY 1: CPT

## 2020-10-08 PROCEDURE — 6360000002 HC RX W HCPCS: Performed by: PODIATRIST

## 2020-10-08 RX ORDER — BLOOD-GLUCOSE METER
1 KIT MISCELLANEOUS DAILY
Qty: 100 EACH | Refills: 3 | Status: SHIPPED | OUTPATIENT
Start: 2020-10-08 | End: 2020-11-06 | Stop reason: SDUPTHER

## 2020-10-08 RX ORDER — UBIQUINOL 100 MG
1 CAPSULE ORAL DAILY
Qty: 100 EACH | Refills: 3 | Status: SHIPPED | OUTPATIENT
Start: 2020-10-08

## 2020-10-08 RX ORDER — NIFEDIPINE 60 MG/1
60 TABLET, FILM COATED, EXTENDED RELEASE ORAL DAILY
Qty: 30 TABLET | Refills: 1 | Status: SHIPPED | OUTPATIENT
Start: 2020-10-09 | End: 2020-11-06 | Stop reason: SDUPTHER

## 2020-10-08 RX ORDER — LOSARTAN POTASSIUM 100 MG/1
100 TABLET ORAL DAILY
Qty: 30 TABLET | Refills: 1 | Status: SHIPPED | OUTPATIENT
Start: 2020-10-09 | End: 2020-11-06 | Stop reason: SDUPTHER

## 2020-10-08 RX ORDER — BLOOD-GLUCOSE METER
1 KIT MISCELLANEOUS ONCE
Qty: 1 KIT | Refills: 0 | Status: SHIPPED | OUTPATIENT
Start: 2020-10-08 | End: 2020-11-16

## 2020-10-08 RX ORDER — CEPHALEXIN 500 MG/1
500 CAPSULE ORAL 4 TIMES DAILY
Qty: 40 CAPSULE | Refills: 0 | Status: SHIPPED | OUTPATIENT
Start: 2020-10-08 | End: 2020-10-18

## 2020-10-08 RX ORDER — POTASSIUM CHLORIDE 20 MEQ/1
20 TABLET, EXTENDED RELEASE ORAL DAILY
Qty: 60 TABLET | Refills: 1 | Status: SHIPPED | OUTPATIENT
Start: 2020-10-08 | End: 2020-11-06 | Stop reason: SDUPTHER

## 2020-10-08 RX ORDER — CARVEDILOL 12.5 MG/1
12.5 TABLET ORAL 2 TIMES DAILY WITH MEALS
Qty: 60 TABLET | Refills: 1 | Status: SHIPPED | OUTPATIENT
Start: 2020-10-08 | End: 2020-11-06 | Stop reason: SDUPTHER

## 2020-10-08 RX ORDER — BLOOD-GLUCOSE METER
1 KIT MISCELLANEOUS ONCE
Qty: 1 KIT | Refills: 0 | Status: SHIPPED | OUTPATIENT
Start: 2020-10-08 | End: 2020-10-08 | Stop reason: SDUPTHER

## 2020-10-08 RX ORDER — HYDROCHLOROTHIAZIDE 25 MG/1
25 TABLET ORAL DAILY
Qty: 30 TABLET | Refills: 1 | Status: SHIPPED | OUTPATIENT
Start: 2020-10-09 | End: 2020-11-06 | Stop reason: SDUPTHER

## 2020-10-08 RX ORDER — UBIQUINOL 100 MG
1 CAPSULE ORAL DAILY
Qty: 100 EACH | Refills: 3 | Status: SHIPPED | OUTPATIENT
Start: 2020-10-08 | End: 2020-10-08 | Stop reason: SDUPTHER

## 2020-10-08 RX ORDER — GLIPIZIDE 5 MG/1
15 TABLET ORAL
Qty: 60 TABLET | Refills: 1 | Status: SHIPPED | OUTPATIENT
Start: 2020-10-09 | End: 2020-11-06 | Stop reason: SDUPTHER

## 2020-10-08 RX ORDER — LANCETS 28 GAUGE
1 EACH MISCELLANEOUS DAILY
Qty: 100 EACH | Refills: 3 | Status: SHIPPED | OUTPATIENT
Start: 2020-10-08 | End: 2020-11-04 | Stop reason: ALTCHOICE

## 2020-10-08 RX ORDER — LANCETS 28 GAUGE
1 EACH MISCELLANEOUS DAILY
Qty: 100 EACH | Refills: 3 | Status: SHIPPED | OUTPATIENT
Start: 2020-10-08 | End: 2020-10-08 | Stop reason: SDUPTHER

## 2020-10-08 RX ORDER — PEN NEEDLE, DIABETIC 29 G X1/2"
1 NEEDLE, DISPOSABLE MISCELLANEOUS DAILY
Qty: 100 EACH | Refills: 1 | Status: SHIPPED | OUTPATIENT
Start: 2020-10-08 | End: 2020-11-04 | Stop reason: ALTCHOICE

## 2020-10-08 RX ADMIN — ENOXAPARIN SODIUM 40 MG: 40 INJECTION SUBCUTANEOUS at 09:01

## 2020-10-08 RX ADMIN — CEPHALEXIN 500 MG: 250 CAPSULE ORAL at 05:51

## 2020-10-08 RX ADMIN — HYDROCHLOROTHIAZIDE 25 MG: 25 TABLET ORAL at 09:02

## 2020-10-08 RX ADMIN — LOSARTAN POTASSIUM 100 MG: 100 TABLET, FILM COATED ORAL at 09:02

## 2020-10-08 RX ADMIN — GLIPIZIDE 15 MG: 5 TABLET ORAL at 05:51

## 2020-10-08 RX ADMIN — CEPHALEXIN 500 MG: 250 CAPSULE ORAL at 14:09

## 2020-10-08 RX ADMIN — NIFEDIPINE 60 MG: 30 TABLET, FILM COATED, EXTENDED RELEASE ORAL at 09:02

## 2020-10-08 RX ADMIN — POTASSIUM CHLORIDE 40 MEQ: 1500 TABLET, EXTENDED RELEASE ORAL at 09:06

## 2020-10-08 RX ADMIN — CARVEDILOL 12.5 MG: 6.25 TABLET, FILM COATED ORAL at 09:01

## 2020-10-08 RX ADMIN — METFORMIN HYDROCHLORIDE 1000 MG: 500 TABLET ORAL at 09:02

## 2020-10-08 NOTE — PROGRESS NOTES
Morning med pass and assessment completed. Pt denies pain at this time, drsg to right foot CDI. Given potassium per replacement protocol. Care plan and education were reviewed with patient and mutually agreed upon. Reviewed potential for falls and safety measures. Patient verbalized understanding and denies any need at this time. Will continue to monitor.

## 2020-10-08 NOTE — PROGRESS NOTES
[facility]   Cardiology Progress Note     Date: 10/8/2020  Admit Date: 10/1/2020       Chief Complaint:   Chief Complaint   Patient presents with    Leg Swelling     Pt to er with c/o swelling to right leg, states concerned not getting enough circulation to leg, wearing eva hose but states swelling started after. also bleeding noted to toes, states happens when his leg swells, denies injury. no hx of CHF, sometimes swelling in left too, denies today       History of Present Illness: History obtained from patient and medical record. Sean Cunningham is a 45 y.o. male admitted 10/1 with osteomyelitis of foot. Echo performed revealing depressed EF of 40-45% and severe LVH with possible septal infiltrative process. No hx of heart disease, no family hx heart disease. Hx smoking. Did not known was diabetic. BP elevated on admit. Interval Hx: Today, he is feeling ok. No cardiac complaints. Patient seen and examined. Clinical notes reviewed. Telemetry reviewed. No new complaints today. No major events overnight. Denies having chest pain, palpitations, shortness of breath, orthopnea/PND, cough, or dizziness at the time of this visit. Allergies:  No Known Allergies    Home Meds:  Prior to Visit Medications    Medication Sig Taking?  Authorizing Provider   cephALEXin (KEFLEX) 500 MG capsule Take 1 capsule by mouth 4 times daily for 10 days Yes Ameya Erwin MD   glipiZIDE (GLUCOTROL) 5 MG tablet Take 3 tablets by mouth every morning (before breakfast) Yes Margarita Medina PA-C   insulin glargine (LANTUS;BASAGLAR) 100 UNIT/ML injection pen Inject 10 Units into the skin nightly Yes Margarita Medina PA-C   metFORMIN (GLUCOPHAGE) 1000 MG tablet Take 1 tablet by mouth 2 times daily (with meals) Yes Margarita Medina PA-C   losartan (COZAAR) 100 MG tablet Take 1 tablet by mouth daily Yes Margarita Medina PA-C   carvedilol (COREG) 12.5 MG tablet Take 1 tablet by mouth 2 times daily (with meals) Yes Henrry Sprout Past Surgical History:    has a past surgical history that includes Toe amputation (Right, 10/5/2020). Social History:  Reviewed. reports that he has quit smoking. His smoking use included cigarettes. He has a 2.50 pack-year smoking history. He has never used smokeless tobacco. He reports current alcohol use of about 3.0 standard drinks of alcohol per week. He reports that he does not use drugs. Family History:  Reviewed. family history includes Diabetes in his father, mother, and sister. Denies family history of sudden cardiac death, arrhythmia, premature CAD    Review of Systems:  · Constitutional: Negative for fever, night sweats, chills, weight changes, or weakness  · Skin: Negative for rash, dry skin, pruritus, bleeding, blood clots, changes in skin pigment, or bruising    · HEENT: Negative for vision changes, ringing in the ears, dysphagia, or swollen lymph nodes  · Respiratory: Reviewed in HPI  · Cardiovascular: Reviewed in HPI  · Gastrointestinal: Negative for abdominal pain, N/V/D, constipation, or black/tarry stools  · Genito-Urinary: Negative for dysuria, incontinence, or hematuria  · Musculoskeletal: Negative for joint swelling, muscle pain, or injuries  · Neurological/Psych: Negative for confusion, seizures, headaches, balance issues or TIA-like symptoms. No anxiety, depression, or insomnia    Physical Examination:  Vitals:    10/08/20 0901   BP: (!) 145/96   Pulse: 76   Resp: 16   Temp: 98 °F (36.7 °C)   SpO2: 98%      No intake/output data recorded. Wt Readings from Last 3 Encounters:   10/01/20 226 lb 8 oz (102.7 kg)     No intake or output data in the 24 hours ending 10/08/20 1125    Telemetry: Personally Reviewed  - Sinus rhythm   · Constitutional: Cooperative and in no apparent distress, and appears well nourished  · Skin: Warm and pink; no pallor, cyanosis, clubbing, or bruising   · HEENT: Symmetric and normocephalic. PERRL, EOM intact. Conjunctiva pink with clear sclera.  Mucus membranes pink and moist.   · Cardiovascular: Regular rate and rhythm. S1/S2 present without murmurs, no rubs or gallops. Peripheral pulses 2+, capillary refill < 3 seconds. No elevation of JVP. +1 RLE peripheral edema  · Respiratory: Respirations symmetric and unlabored. Lungs clear to auscultation bilaterally, no wheezing, crackles, or rhonchi  · Gastrointestinal: Abdomen soft and round. Bowel sounds normoactive without tenderness or masses. · Musculoskeletal: Bilateral upper and lower extremity strength 5/5 with full ROM  · Neurologic/Psych: Awake and orientated to person, place and time. Calm affect, appropriate mood    Pertinent labs, diagnostic, device, and imaging results reviewed as a part of this visit    Labs:    BMP:   Recent Labs     10/06/20  0805 10/07/20  0640 10/07/20  1641 10/08/20  0540   * 136  --  137   K 3.4* 2.9* 3.6 3.4*   CL 95* 98*  --  99   CO2 27 29  --  30   BUN 14 14  --  13   CREATININE 0.8* 0.8*  --  0.7*   MG 2.10 2.00  --  2.00     Estimated Creatinine Clearance: 170 mL/min (A) (based on SCr of 0.7 mg/dL (L)).    CBC:   Recent Labs     10/06/20  0806 10/07/20  0640 10/08/20  0540   WBC 9.3 7.7 6.7   HGB 14.8 14.2 14.3   HCT 44.9 41.5 41.8   MCV 87.3 86.9 87.2    297 303     Thyroid: No results found for: TSH, I2MICTS, R8COFLZ, THYROIDAB  Lipids: No results found for: CHOL, HDL, TRIG  LFTS:   Lab Results   Component Value Date    ALT 15 10/01/2020    AST 12 10/01/2020    ALKPHOS 158 10/01/2020    PROT 7.3 10/01/2020    AGRATIO 0.6 10/01/2020    BILITOT 0.7 10/01/2020     Cardiac Enzymes:   Lab Results   Component Value Date    TROPONINI <0.01 10/03/2020     Coags: No results found for: PROTIME, INR    ECG: 10/6/20  Normal sinus rhythm  Left anterior fascicular block  Lateral infarct (cited on or before 03-OCT-2020)  Abnormal ECG  When compared with ECG of 03-OCT-2020 15:57,  No significant change was found  Confirmed by [facility] MD, [id number] ([id number]) on 10/7/2020 9:19:51 AM    ECHO: 10/3/20   Summary   -Severe LVH with global hypokinesis and EF 40-45%. Septal endocardium is very bright suggesting of an infiltrative process. (There is no EKG on the chart to evaluate for voltage). -Mild to moderate mitral regurgitation with moderately enlarged LA. -IVC size is dilated (>2.1 cm) but collapses > 50% with respiration   consistent with elevated RA pressure (8 mmHg). SHEBA: 10/6/20  Summary   *Left ventricle - normal in size, reduced function with EF of 40%   *Right ventricle - normal in size, reduced function   *Tricuspid valve - trivial regurgitation   *No evidence for endocarditis    Stress Test: 10/6/20  Summary     Small sized anterior, and infero-basal minimally reversible defect of     moderate intensity. Dilated left ventricular with mildly reduced ejection     fraction at 44 %.       Overall findings represent a intermediate risk scan. Problem List:   Patient Active Problem List    Diagnosis Date Noted    Streptococcal infection     Cellulitis of skin with lymphangitis     Hyperglycemia     Acute hematogenous osteomyelitis of right foot ([address]     Ex-smoker     Diabetic polyneuropathy associated with type 2 diabetes mellitus ([address]     Class 1 obesity due to excess calories with body mass index (BMI) of 32.0 to 32.9 in adult     Diabetes education, encounter for     Cellulitis of right foot 10/01/2020    Sepsis ([address] 10/01/2020    Fever 10/01/2020    Tachycardia 53/32/4318    Neutrophilic leukocytosis 16/49/7810    Poorly controlled type 2 diabetes mellitus ([address] 10/01/2020    Essential hypertension 10/01/2020        Assessment and Plan:     Cardiomyopathy   ~ New incidental finding on echo, may be r/t uncontrolled HTN  ~ SHEBA with fairly dense septum, LVH noted, EF reduced, no endocarditis  ~ Stress test with minimal reversible defect.  Cath deferred until he recovers from foot surgery and infection   ~ losartan / coreg     Osteomyelitis    ~ per ID, podiatry, and primary   ~ s/p 3rd toe amputation 10/5    DM   ~ new diagnosis, hgb A1C 11.7  ~ per primary     Hypertension   ~ new diagnosis, likely longstanding   ~ BP goal <140/   ~ controlled     Educated pt on BP monitoring and salt/fluid restriction. Pt stable from a cardiac standpoint. Needs hospital follow up in 2-3 weeks. Multiple medical conditions with risk of decompensation. All pertinent information and plan of care discussed with the physician. All questions and concerns were addressed to the patient. Alternatives to my treatment were discussed. I have discussed the above stated plan with patient and family and the nurse. The patient and family verbalized understanding and agreed with the plan. Thank you for allowing to us to participate in the care of Sabrina Oliveros.     Harmony Fearing APRN-CNP  [facility]   Office: [phone]

## 2020-10-08 NOTE — PROGRESS NOTES
Shift assessment completed, pt is alert and oriented, VSS, indpendent in room, call light within reach, denies any pain or other needs at this point. See flowsheets and LDA. Will monitor pt. The care plan and education has been reviewed and mutually agreed upon with the patient.

## 2020-10-08 NOTE — PROGRESS NOTES
Pt declined to use language line, stated he preferred to have family member translate. Reviewed discharge instructions, follow up, and medications. Follow up appointments for cardiology, podiatry, and a PCP appointment scheduled prior to discharge. Pt expressed understanding of dates/times of follow up appointments. Reviewed medications in detail, noting indications, side effects, and time to take. He expressed understanding and had all questions answered. Medications delivered to bedside. IV removed without issue. Pt discharging home in stable condition with all belongings.

## 2020-10-08 NOTE — DISCHARGE SUMMARY
[facility]    Patient Demographics    Patient. Kae Teague  Date of Birth. 1980  MRN. [id number]     Primary care provider. No primary care provider on file. (Tel: None)    Admit date: 10/1/2020    Discharge date (blank if same as Note Date): Note Date: 10/8/2020     Reason for Hospitalization. Chief Complaint   Patient presents with    Leg Swelling     Pt to er with c/o swelling to right leg, states concerned not getting enough circulation to leg, wearing eva hose but states swelling started after. also bleeding noted to toes, states happens when his leg swells, denies injury. no hx of CHF, sometimes swelling in left too, denies today         Significant Findings. Principal Problem:    Cellulitis of right foot  Active Problems:    Sepsis (HCC)    Fever    Tachycardia    Neutrophilic leukocytosis    Poorly controlled type 2 diabetes mellitus (HCC)    Essential hypertension    Cellulitis of skin with lymphangitis    Hyperglycemia    Acute hematogenous osteomyelitis of right foot ([address]    Ex-smoker    Diabetic polyneuropathy associated with type 2 diabetes mellitus (HCC)    Class 1 obesity due to excess calories with body mass index (BMI) of 32.0 to 32.9 in adult    Diabetes education, encounter for    Streptococcal infection  Resolved Problems:    * No resolved hospital problems. *       Problems and results from this hospitalization that need follow up. 1. Post op follow up   2. Hypertension  3. DM 2  4. Cardiomyopathy  5. Abnormal GXT    Significant test results and incidental findings. XR FOOT RIGHT (MIN 3 VIEWS)   Final Result   No acute osseous abnormality of the right foot status post amputation of the   3rd toe.   Scattered soft tissue gas with dorsal soft tissue swelling.           MRI FOOT RIGHT W WO CONTRAST   Final Result   Cellulitis most prominent involving the 3rd digit where there is a soft   tissue ulceration along the dorsal aspect extending to the PIP joint. Early   erosive changes at the PIP joint and findings of osteomyelitis involving the   middle and proximal phalanx of the 3rd digit. No defined fluid collection.           VL Extremity Venous Right   Final Result       XR FOOT RIGHT (MIN 3 VIEWS)   Final Result   No acute osseous abnormality right foot.       Soft tissue edema presumably reflecting cellulitis, contusion and/or sprain.       Follow-up imaging recommended if pain persists or worsens following   conservative management.              GXT   Summary     Small sized anterior, and infero-basal minimally reversible defect of     moderate intensity. Dilated left ventricular with mildly reduced ejection     fraction at 44 %.        Invasive procedures and treatments. 1. None     Problem-based Hospital Course. Patient is a 61-year-old  speaking male with no prior known history of chronic illness who presented to the emergency room for evaluation of increased pain, swelling and erythema of his right forefoot. In addition, evaluation revealed previously undiagnosed hypertension and diabetes mellitus.  He was admitted under hospitalist service for further evaluation and treatment.  Podiatry was involved due to concern for abscess and osteomyelitis.  MRI on 10/3 showed right foot osteomyelitis.  ID service was consulted.  2D echocardiogram done on 10/3 showed severe left ventricular hypertrophy, LVEF of 40 to 45%, global hypokinesis and septum with infiltrative process.  Cardiology was consulted on 10/3. SHEBA performed 10/5, as per cardiology note no vegetation. Had R 3rd toe amp on 10/5. GXT on 10/5 which was abnormal. Cardiology has recommended outpatient L heart cath once recovered from foot infection. 1. Right foot osteomyelitis:   Patient was started on IV vancomycin and cefepime on admission. Podiatry was consulted.   Patient underwent MRI foot.  ID was consulted after MRI foot showed osteomyelitis. Cultures were positive for group b strep. Patient had R 3rd toe amputation on Monday. Pathology margins were clear. He was discharged on Keflex. He will follow up with podiatry in one week.      2. Chronic undiagnosed, untreated type-2 DM, complicated by neuropathy and foot osteomyelitis:   Hemoglobin A1c of 11.7.   Patient does not have medical insurance. Unable to afford medications on his own. Currently on metformin 1 g p.o. twice daily and glipizide 15 mg p.o. daily, and low dose lantus. Sugars are controlled at time of dc. He was given one month of medications by the outpatient pharmacy. He has a new patient appt with Dr Jorge Ward in one month.       3. Undiagnosed, untreated HTN:   Patient was initiated on losartan, coreg, and Procardia. BP is improved at time of dc.      4. Abnormal stress test-this was performed due to cardiomyopathy. GXT revealed a small minimally reversible defect. Cardiology has recommended outpatient cath once recovered from foot infection.      5. LVH, cardiomyopathy:   2D echocardiogram was done on 10/3 to evaluate cardiac function showed severe left ventricular hypertrophy with global hypokinesis, LVEF of 40 to 45%. Possible septal infiltrative process reported. Cardiology consulted. Patient had SHEBA negative for endocarditis but had dense septum. Likely from untreated htn    Consults. IP CONSULT TO PODIATRY  IP CONSULT TO DIABETES EDUCATOR  IP CONSULT TO SOCIAL WORK  IP CONSULT TO INFECTIOUS DISEASES  IP CONSULT TO FINANCIAL COUNSELOR  IP CONSULT TO CARDIOLOGY  IP CONSULT TO DIETITIAN    Physical examination on discharge day. /89   Pulse 79   Temp 97.6 °F (36.4 °C) (Oral)   Resp 16   Ht 5' 10\" (1.778 m)   Wt 226 lb 8 oz (102.7 kg)   SpO2 98%   BMI 32.50 kg/m²   General appearance. Alert. Looks comfortable. HEENT. Sclera clear. Moist mucus membranes. Cardiovascular. Regular rate and rhythm, normal S1, S2.  No murmur. Respiratory. Not using accessory muscles. Clear to auscultation bilaterally, no wheeze. Gastrointestinal. Abdomen soft, non-tender, not distended, normal bowel sounds  Neurology. Facial symmetry. No speech deficits. Moving all extremities equally. Extremities. No edema in lower extremities. Skin. Warm, dry, normal turgor    Condition at time of discharge stable    Medication instructions provided to patient at discharge.      Medication List      START taking these medications    Alcohol Prep 70 % Pads  1 each by Does not apply route daily     carvedilol 12.5 MG tablet  Commonly known as:  COREG  Take 1 tablet by mouth 2 times daily (with meals)  Notes to patient:  Use: control blood pressure  Side effects: dizziness     cephALEXin 500 MG capsule  Commonly known as:  KEFLEX  Take 1 capsule by mouth 4 times daily for 10 days     FreeStyle Freedom Lite w/Device Kit  1 kit by Does not apply route once for 1 dose     FreeStyle Lancets Misc  1 each by Does not apply route daily     FREESTYLE LITE strip  Generic drug:  blood glucose test strips  1 each by In Vitro route daily ICD code - Ell.9     glipiZIDE 5 MG tablet  Commonly known as:  GLUCOTROL  Take 3 tablets by mouth every morning (before breakfast)  Start taking on:  October 9, 2020     hydroCHLOROthiazide 25 MG tablet  Commonly known as:  HYDRODIURIL  Take 1 tablet by mouth daily  Start taking on:  October 9, 2020     insulin glargine 100 UNIT/ML injection pen  Commonly known as:  LANTUS;BASAGLAR  Inject 10 Units into the skin nightly     losartan 100 MG tablet  Commonly known as:  COZAAR  Take 1 tablet by mouth daily  Start taking on:  October 9, 2020     [employer] Pen Needles 29G X 12MM Misc  Generic drug:  Insulin Pen Needle  1 each by Does not apply route daily     metFORMIN 1000 MG tablet  Commonly known as:  GLUCOPHAGE  Take 1 tablet by mouth 2 times daily (with meals)     NIFEdipine 60 MG extended release tablet  Commonly known as:  ADALAT CC  Take

## 2020-10-11 LAB
ANAEROBIC CULTURE: ABNORMAL
CULTURE SURGICAL: ABNORMAL
ORGANISM: ABNORMAL

## 2020-10-12 NOTE — PROGRESS NOTES
[address]  10/15/20  Referring: None but will see Dr. Constantine Erickson as a new patient 2020. REASON FOR CONSULT/CHIEF COMPLAINT/HPI     Reason for visit/ Chief complaint  New Patient  Heart Failure   HPI Luis Valladares is a 45 y.o. male who is B[place]ian speaking only (moved to the [address] [place] [address],[address] from [facility] in ). He was admitted earlier Oct 2020 with osteomyelitis of his right foot leading to amputation of 3rd digit on 10/5/2020. An ECHO 10/3/2020 revealed EF 40-45% with severe LVH. He was found to be hypertensive and diabetic requiring insulin during this admission. Prior to the current issues, he has never been in the hospital or treated for any medical problem. He lives with his sister's family. He has a 12year old daughter who lives with her mother. Today, the  is present. He reports that has occasionally had chest discomfort and shortness of breath, but it has resolved. He states that he was previously working and capable of walking miles before his foot surgery. No history of syncope or palpitations. He states that he has had some troubles with blood sugars. He has not been taking his insulin because he has been having sugars in the 70s and feeling symptoms. He brought a log today with him that shows well-controlled sugars. HISTORY/ALLERGIES/ROS     MedHx:   has no past medical history on file. SurgHx:  has a past surgical history that includes Toe amputation (Right, 10/5/2020). SocHx:   reports that he has quit smoking. His smoking use included cigarettes. He has a 2.50 pack-year smoking history. He has never used smokeless tobacco. He reports current alcohol use of about 3.0 standard drinks of alcohol per week. He reports that he does not use drugs. FamHx:   His mother  age 70, had Diabetes; his father  age 48 of unknown cause but CPR was initiated without success. His 6 sisters/3 brothers are all healthy (one sister has Diabetes).  His aunt has heart problems, has pacemaker. His grandparents' health are unknown; he never knew them. Allergies: Patient has no known allergies. ROS:   Review of Systems   Constitutional: Positive for fatigue. Negative for activity change, diaphoresis and fever. HENT: Negative for congestion and ear discharge. Eyes: Negative for photophobia and visual disturbance. Respiratory: Positive for shortness of breath. Negative for cough and chest tightness. Cardiovascular: Negative for chest pain and palpitations. Gastrointestinal: Positive for nausea. Negative for abdominal distention, abdominal pain and blood in stool. Endocrine: Negative for cold intolerance and polydipsia. Genitourinary: Negative for difficulty urinating and flank pain. Musculoskeletal: Positive for arthralgias and myalgias. Skin: Positive for wound. Negative for rash. Allergic/Immunologic: Negative for environmental allergies and immunocompromised state. Neurological: Negative for dizziness and headaches. Hematological: Negative for adenopathy. Does not bruise/bleed easily. Psychiatric/Behavioral: Negative for confusion. The patient is not hyperactive. MEDICATIONS      Prior to Admission medications    Medication Sig Start Date End Date Taking?  Authorizing Provider   insulin glargine (LANTUS;BASAGLAR) 100 UNIT/ML injection pen ON HOLD FOR NOW  Was taking 10u @hs 10/15/20  Yes Domenico Baptiste,    cephALEXin (KEFLEX) 500 MG capsule Take 1 capsule by mouth 4 times daily for 10 days 10/8/20 10/18/20 Yes Andrew Iyer MD   glipiZIDE (GLUCOTROL) 5 MG tablet Take 3 tablets by mouth every morning (before breakfast) 10/9/20  Yes Eldon Arshad PA-C   metFORMIN (GLUCOPHAGE) 1000 MG tablet Take 1 tablet by mouth 2 times daily (with meals) 10/8/20  Yes Eldon Arshad PA-C   losartan (COZAAR) 100 MG tablet Take 1 tablet by mouth daily 10/9/20  Yes Eldon Arshad PA-C   carvedilol (COREG) 12.5 MG tablet Take 1 tablet by mouth 2 times daily (with meals) 10/8/20  Yes Valene Sergeant, PA-C   NIFEdipine (ADALAT CC) 60 MG extended release tablet Take 1 tablet by mouth daily 10/9/20  Yes Valene Sergeant, PA-C   hydroCHLOROthiazide (HYDRODIURIL) 25 MG tablet Take 1 tablet by mouth daily 10/9/20  Yes Valene Sergeant, PA-C   potassium chloride (KLOR-CON M) 20 MEQ extended release tablet Take 1 tablet by mouth daily 10/8/20  Yes Valene Sergeant, PA-C   Insulin Pen Needle ([employer] PEN NEEDLES) 29G X 12MM MISC 1 each by Does not apply route daily 10/8/20  Yes Valene Sergeant, PA-C   blood glucose test strips (FREESTYLE LITE) strip 1 each by In Vitro route daily ICD code - Ell.9 10/8/20  Yes Valene Sergeant, PA-C   FreeStyle Lancets MISC 1 each by Does not apply route daily 10/8/20  Yes Valene Sergeant, PA-C   Blood Glucose Monitoring Suppl (FREESTYLE FREEDOM LITE) w/Device KIT 1 kit by Does not apply route once for 1 dose 10/8/20 10/15/20 Yes Valene Sergeant, PA-C   Alcohol Swabs (ALCOHOL PREP) 70 % PADS 1 each by Does not apply route daily 10/8/20  Yes Valene Sergeant, PA-C   Insulin Pen Needle 32G X 4 MM MISC Inject 1 each into the skin daily 10/8/20  Yes Silverio Lozano MD     PHYSICAL EXAM        Vitals:    10/15/20 1522   BP: 115/80   Pulse: 83   SpO2: 98%    Weight: 205 lb 1.6 oz (93 kg)     Gen Alert, cooperative, no distress Heart  Regular rate and rhythm, +s4   Head Normocephalic, atraumatic, no abnormalities Abd  Soft, NT, +BS, no mass, no OM   Eyes PERRLA, conj/corn clear Ext  Foot in post-surgical boot   Nose Nares normal, no drain age, Non-tender Pulse 2+ and symmetric   Throat Lips, mucosa, tongue normal Skin Color/text/turg nl, no rash/lesions   Neck S/S, TM, NT, no bruit Psych Nl mood and affect   Lung  CTA-B, unlabored, no DTP     Ch wall NT, no deform       LABS and Imaging     Relevant and available CV data reviewed  Echo 10/3/2020: -Severe LVH with global hypokinesis and EF 40-45%. Septal endocardium is   very bright suggesting of an infiltrative process.  (There is no EKG on the chart to evaluate for voltage). -Mild to moderate mitral regurgitation with moderately enlarged LA. -IVC size is dilated (>2.1 cm) but collapses > 50% with respiration consistent with elevated RA pressure (8 mmHg). Cath: None  Holter: None  EKG 10/1/2020: NSR 86, LAFB, lateral infarct    Lexiscan heena 10/6/2020:   Small sized anterior and infero-basal minimally reversible defect of     moderate intensity. Dilated left ventricular with mildly reduced ejection fraction at 44%.          Overall findings represent a intermediate risk scan.         Stress Protocols          Resting ECG     Normal sinus rhythm. LVH.           High level of complexity given multisystem disease, drug-drug interactions, and high medical decision making given new diagnosis of cardiomyopathy, abnormal stress test, new diabetes, social barriers, and threat to life. ASSESSMENT AND PLAN     1. Cardiomyopathy:   EF 40-45% by ECHO 10/3/2020  Severe LVH w/ septal infiltrative process  by ECHO 10/3/2020  Differential: uncontrolled hypertension, ischemia, infiltrative cardiomyopathy, amyloid, hcm, fabrys, ischemia  Plan:  - continue coreg 12.5 mg bid and losartan 100 mg once daily  - check ferritin   - cardiac mri w/wout contrast  - will need cardiac cath when infection controlled given positive stress test. Patient is currently asymptomatic on medical therapy with betablocker. Will add aspirin 81 mg    2. Hypertension  Plan:  - continue nifedipine and hctz    3. Diabetes mellitus, newly diagnosed: New PCP brielle't 11/4/2020. Amputation right 3rd toe 10/5/2020. Foot wrapped with ACE/sock, wearing special shoe-brace. Takes oral meds. Stopped insulin two days ago due to feeling shaky and feels better now. Glucose 115 this morning, 85 at lunch.    4, Care Coordination and social barriers  - needs insurance and PCP    Follow Up: Other  3 weeks  Dr. Pierce Snyder attestation:  This note

## 2020-10-15 ENCOUNTER — OFFICE VISIT (OUTPATIENT)
Dept: CARDIOLOGY CLINIC | Age: 40
End: 2020-10-15
Payer: MEDICAID

## 2020-10-15 ENCOUNTER — HOSPITAL ENCOUNTER (OUTPATIENT)
Age: 40
Setting detail: SPECIMEN
Discharge: HOME | End: 2020-10-15

## 2020-10-15 VITALS
SYSTOLIC BLOOD PRESSURE: 115 MMHG | WEIGHT: 205.1 LBS | DIASTOLIC BLOOD PRESSURE: 80 MMHG | OXYGEN SATURATION: 98 % | BODY MASS INDEX: 32.19 KG/M2 | HEART RATE: 83 BPM | HEIGHT: 67 IN

## 2020-10-15 LAB
ANION GAP SERPL CALCULATED.3IONS-SCNC: 12 MMOL/L (ref 3–16)
BUN BLDV-MCNC: 24 MG/DL (ref 7–20)
CALCIUM SERPL-MCNC: 9.9 MG/DL (ref 8.3–10.6)
CHLORIDE BLD-SCNC: 94 MMOL/L (ref 99–110)
CO2: 30 MMOL/L (ref 21–32)
CREAT SERPL-MCNC: 1.5 MG/DL (ref 0.9–1.3)
GFR AFRICAN AMERICAN: >60
GFR NON-AFRICAN AMERICAN: 52
GLUCOSE BLD-MCNC: 100 MG/DL (ref 70–99)
POTASSIUM SERPL-SCNC: 4.9 MMOL/L (ref 3.5–5.1)
PRO-BNP: 719 PG/ML (ref 0–124)
SODIUM BLD-SCNC: 136 MMOL/L (ref 136–145)

## 2020-10-15 PROCEDURE — 80048 BASIC METABOLIC PNL TOTAL CA: CPT

## 2020-10-15 PROCEDURE — 82728 ASSAY OF FERRITIN: CPT

## 2020-10-15 PROCEDURE — 83880 ASSAY OF NATRIURETIC PEPTIDE: CPT

## 2020-10-15 PROCEDURE — 36415 COLL VENOUS BLD VENIPUNCTURE: CPT

## 2020-10-15 PROCEDURE — 99205 OFFICE O/P NEW HI 60 MIN: CPT | Performed by: INTERNAL MEDICINE

## 2020-10-15 NOTE — PATIENT INSTRUCTIONS
1 valores normales de azucar 60-99.  2. No tome la insulina hasta que anayeli al [employer] . 3. Continue apuntando los valores de azucar  4. Continue con los medicamentos orales. Tiene 1 orden nuva para todos ellos. 5. Termine con el antibiotico (Cephalexin). No hay ninguna orden para senia.   6. Preguntar a la maria e de la historia medica ([place]) de los abuelos

## 2020-10-16 LAB — FERRITIN: 311.2 NG/ML (ref 30–400)

## 2020-10-16 RX ORDER — ASPIRIN 81 MG/1
81 TABLET ORAL DAILY
Qty: 90 TABLET | Refills: 1 | Status: SHIPPED | OUTPATIENT
Start: 2020-10-16 | End: 2020-11-06 | Stop reason: SDUPTHER

## 2020-10-16 ASSESSMENT — ENCOUNTER SYMPTOMS
CHEST TIGHTNESS: 0
ABDOMINAL DISTENTION: 0
COUGH: 0
NAUSEA: 1
SHORTNESS OF BREATH: 1
ABDOMINAL PAIN: 0
BLOOD IN STOOL: 0
PHOTOPHOBIA: 0

## 2020-10-16 NOTE — RESULT ENCOUNTER NOTE
Please have Mr. Sharmila Gonzales stop his losartan for 3 days and have a renal panel checked on Monday.

## 2020-10-19 ENCOUNTER — TELEPHONE (OUTPATIENT)
Dept: CARDIOLOGY CLINIC | Age: 40
End: 2020-10-19

## 2020-10-19 NOTE — TELEPHONE ENCOUNTER
----- Message from Yasir Cobos DO sent at 10/17/2020  1:00 PM EDT -----  Regarding: follow up renal  Please have Mr. Ly Montes stop his losartan for 3 days and have a renal panel checked

## 2020-11-04 ENCOUNTER — OFFICE VISIT (OUTPATIENT)
Dept: PRIMARY CARE CLINIC | Age: 40
End: 2020-11-04
Payer: MEDICAID

## 2020-11-04 VITALS
HEART RATE: 78 BPM | DIASTOLIC BLOOD PRESSURE: 78 MMHG | WEIGHT: 207 LBS | TEMPERATURE: 98.5 F | BODY MASS INDEX: 32.49 KG/M2 | OXYGEN SATURATION: 98 % | SYSTOLIC BLOOD PRESSURE: 112 MMHG | HEIGHT: 67 IN

## 2020-11-04 LAB
CREATININE URINE POCT: 200
MICROALBUMIN/CREAT 24H UR: 150 MG/G{CREAT}
MICROALBUMIN/CREAT UR-RTO: >300

## 2020-11-04 PROCEDURE — 99204 OFFICE O/P NEW MOD 45 MIN: CPT | Performed by: INTERNAL MEDICINE

## 2020-11-04 PROCEDURE — 82044 UR ALBUMIN SEMIQUANTITATIVE: CPT | Performed by: INTERNAL MEDICINE

## 2020-11-04 ASSESSMENT — ENCOUNTER SYMPTOMS
CHEST TIGHTNESS: 0
DIARRHEA: 0
NAUSEA: 0
COUGH: 0
ABDOMINAL DISTENTION: 0
ABDOMINAL PAIN: 0
SHORTNESS OF BREATH: 0
BACK PAIN: 0

## 2020-11-04 ASSESSMENT — PATIENT HEALTH QUESTIONNAIRE - PHQ9
SUM OF ALL RESPONSES TO PHQ QUESTIONS 1-9: 0
SUM OF ALL RESPONSES TO PHQ9 QUESTIONS 1 & 2: 0
1. LITTLE INTEREST OR PLEASURE IN DOING THINGS: 0
SUM OF ALL RESPONSES TO PHQ QUESTIONS 1-9: 0
SUM OF ALL RESPONSES TO PHQ QUESTIONS 1-9: 0
2. FEELING DOWN, DEPRESSED OR HOPELESS: 0

## 2020-11-04 NOTE — PROGRESS NOTES
SUBJECTIVE:  11/4/2020   Seven Reynosoclair  1980     NEW TO PROVIDER  REQUIRES     No past medical history on file. Past Surgical History:   Procedure Laterality Date    TOE AMPUTATION Right 10/5/2020    RIGHT THIRD TOE AMPUTATION performed by Nakul Murphy DPM at [facility] History   Problem Relation Age of Onset    Diabetes Mother     Diabetes Father     Diabetes Sister       Social History     Socioeconomic History    Marital status: Single     Spouse name: Not on file    Number of children: Not on file    Years of education: Not on file    Highest education level: Not on file   Occupational History    Not on file   Social Needs    Financial resource strain: Not on file    Food insecurity     Worry: Not on file     Inability: Not on file    Transportation needs     Medical: Not on file     Non-medical: Not on file   Tobacco Use    Smoking status: Former Smoker     Packs/day: 0.25     Years: 10.00     Pack years: 2.50     Types: Cigarettes    Smokeless tobacco: Never Used   Substance and Sexual Activity    Alcohol use:  Yes     Alcohol/week: 3.0 standard drinks     Types: 3 Cans of beer per week    Drug use: Never    Sexual activity: Not on file   Lifestyle    Physical activity     Days per week: Not on file     Minutes per session: Not on file    Stress: Not on file   Relationships    Social connections     Talks on phone: Not on file     Gets together: Not on file     Attends Alevism service: Not on file     Active member of club or organization: Not on file     Attends meetings of clubs or organizations: Not on file     Relationship status: Not on file    Intimate partner violence     Fear of current or ex partner: Not on file     Emotionally abused: Not on file     Physically abused: Not on file     Forced sexual activity: Not on file   Other Topics Concern    Not on file   Social History Narrative    Not on file      No Known Allergies   Current Outpatient Neurological: Negative for dizziness and headaches. Psychiatric/Behavioral: Negative for agitation and behavioral problems. The patient is not nervous/anxious. OBJECTIVE:  /78 (Site: Right Upper Arm, Position: Sitting, Cuff Size: Medium Adult)   Pulse 78   Temp 98.5 °F (36.9 °C) (Infrared)   Ht 5' 7\" (1.702 m)   Wt 207 lb (93.9 kg)   SpO2 98%   BMI 32.42 kg/m²    Physical Exam  Vitals signs and nursing note reviewed. Constitutional:       General: He is not in acute distress. Appearance: He is well-developed. Comments: /78 (Site: Right Upper Arm, Position: Sitting, Cuff Size: Medium Adult)   Pulse 78   Temp 98.5 °F (36.9 °C) (Infrared)   Ht 5' 7\" (1.702 m)   Wt 207 lb (93.9 kg)   SpO2 98%   BMI 32.42 kg/m²    HENT:      Head: Normocephalic and atraumatic. Eyes:      General: No scleral icterus. Right eye: No discharge. Left eye: No discharge. Conjunctiva/sclera: Conjunctivae normal.      Pupils: Pupils are equal, round, and reactive to light. Neck:      Musculoskeletal: Normal range of motion and neck supple. Thyroid: No thyromegaly. Vascular: No JVD. Trachea: No tracheal deviation. Cardiovascular:      Rate and Rhythm: Normal rate and regular rhythm. Heart sounds: Normal heart sounds. No murmur. Pulmonary:      Effort: Pulmonary effort is normal. No respiratory distress. Breath sounds: Normal breath sounds. No wheezing or rales. Abdominal:      General: Bowel sounds are normal. There is no distension. Palpations: Abdomen is soft. Tenderness: There is no abdominal tenderness. There is no guarding or rebound. Genitourinary:     Rectum: Guaiac result negative. Musculoskeletal: Normal range of motion. General: No tenderness. Lymphadenopathy:      Cervical: No cervical adenopathy. Skin:     General: Skin is warm and dry. Findings: No erythema or rash.    Neurological:      Mental Status: He is alert and oriented to person, place, and time. Cranial Nerves: No cranial nerve deficit. Coordination: Coordination normal.      Deep Tendon Reflexes: Reflexes normal.   Psychiatric:         Behavior: Behavior normal.         Thought Content:  Thought content normal.         Data Review:  No results found for: CHOL  No results found for: TRIG  No results found for: HDL  No results found for: LDLCHOLESTEROL, LDLCALC  No results found for: LABVLDL, VLDL  No results found for: [facility]   Lab Results   Component Value Date    WBC 6.7 10/08/2020    HGB 14.3 10/08/2020    HCT 41.8 10/08/2020    MCV 87.2 10/08/2020     10/08/2020     Lab Results   Component Value Date     10/15/2020    K 4.9 10/15/2020    CL 94 (L) 10/15/2020    CO2 30 10/15/2020    BUN 24 (H) 10/15/2020    CREATININE 1.5 (H) 10/15/2020    GLUCOSE 100 (H) 10/15/2020    CALCIUM 9.9 10/15/2020    PROT 7.3 10/01/2020    LABALBU 2.8 (L) 10/01/2020    BILITOT 0.7 10/01/2020    ALKPHOS 158 (H) 10/01/2020    AST 12 (L) 10/01/2020    ALT 15 10/01/2020    LABGLOM 52 (A) 10/15/2020    GFRAA >60 10/15/2020    AGRATIO 0.6 (L) 10/01/2020    GLOB 4.5 10/01/2020       ASSESSMENT/PLAN  1.) NIDDM- OK to remain on metformin and glucotrol for now-check A1C and f/u visit in 1 month  MICRAL POCT ordered today      2.) HTN- continue current medications, on ACEi and beta blocker, has refills    F/u in 1 month to review labs    Electronically signed by Vidal Smith MD on 11/4/2020 at 1:40 PM

## 2020-11-06 ENCOUNTER — HOSPITAL ENCOUNTER (OUTPATIENT)
Dept: MRI IMAGING | Age: 40
Discharge: HOME | End: 2020-11-06

## 2020-11-06 LAB
LV EF: 66 %
LVEF MODALITY: NORMAL

## 2020-11-06 PROCEDURE — 75561 CARDIAC MRI FOR MORPH W/DYE: CPT

## 2020-11-06 PROCEDURE — 6360000004 HC RX CONTRAST MEDICATION: Performed by: INTERNAL MEDICINE

## 2020-11-06 PROCEDURE — A9585 GADOBUTROL INJECTION: HCPCS | Performed by: INTERNAL MEDICINE

## 2020-11-06 RX ORDER — HYDROCHLOROTHIAZIDE 25 MG/1
25 TABLET ORAL DAILY
Qty: 30 TABLET | Refills: 1 | Status: SHIPPED | OUTPATIENT
Start: 2020-11-06 | End: 2020-12-02 | Stop reason: SDUPTHER

## 2020-11-06 RX ORDER — ASPIRIN 81 MG/1
81 TABLET ORAL DAILY
Qty: 90 TABLET | Refills: 1 | Status: SHIPPED | OUTPATIENT
Start: 2020-11-06

## 2020-11-06 RX ORDER — BLOOD-GLUCOSE METER
1 KIT MISCELLANEOUS DAILY
Qty: 100 EACH | Refills: 3 | Status: SHIPPED | OUTPATIENT
Start: 2020-11-06

## 2020-11-06 RX ORDER — POTASSIUM CHLORIDE 20 MEQ/1
20 TABLET, EXTENDED RELEASE ORAL DAILY
Qty: 60 TABLET | Refills: 1 | Status: SHIPPED | OUTPATIENT
Start: 2020-11-06 | End: 2021-03-08 | Stop reason: SDUPTHER

## 2020-11-06 RX ORDER — NIFEDIPINE 60 MG/1
60 TABLET, FILM COATED, EXTENDED RELEASE ORAL DAILY
Qty: 30 TABLET | Refills: 1 | Status: SHIPPED | OUTPATIENT
Start: 2020-11-06 | End: 2020-12-02 | Stop reason: SDUPTHER

## 2020-11-06 RX ORDER — CARVEDILOL 12.5 MG/1
12.5 TABLET ORAL 2 TIMES DAILY WITH MEALS
Qty: 60 TABLET | Refills: 1 | Status: SHIPPED | OUTPATIENT
Start: 2020-11-06 | End: 2020-12-02 | Stop reason: SDUPTHER

## 2020-11-06 RX ORDER — LOSARTAN POTASSIUM 100 MG/1
100 TABLET ORAL DAILY
Qty: 30 TABLET | Refills: 1 | Status: SHIPPED | OUTPATIENT
Start: 2020-11-06 | End: 2020-11-13

## 2020-11-06 RX ORDER — GLIPIZIDE 5 MG/1
15 TABLET ORAL
Qty: 60 TABLET | Refills: 1 | Status: SHIPPED | OUTPATIENT
Start: 2020-11-06 | End: 2020-12-02 | Stop reason: SDUPTHER

## 2020-11-06 RX ADMIN — GADOBUTROL 14 ML: 604.72 INJECTION INTRAVENOUS at 11:31

## 2020-11-06 NOTE — TELEPHONE ENCOUNTER
Patient called in stating that he was not able to get medications refilled. He states that he went to [facility] and they told him he would have to have a new order from  in order for them to fill medications. He needs all his medications sent to [facility].

## 2020-11-09 ENCOUNTER — TELEPHONE (OUTPATIENT)
Dept: CARDIOLOGY | Age: 40
End: 2020-11-09

## 2020-11-09 LAB
FUNGUS (MYCOLOGY) CULTURE: NORMAL
FUNGUS STAIN: NORMAL

## 2020-11-09 NOTE — TELEPHONE ENCOUNTER
I spoke to his niece Thom Cruz (on [employer]); she assists him with appt's, speaks fluent [place]. She states he can come in to see Dr. Cao President 11/16/2020 at 12:30; I assured her an  would be present for that brielle't. She will bring him to the hospital in a few days for the lab work (order is already in [address]); she understands it needs drawn before the brielle't. CALL CENTER: Please schedule  (and put him on DKW schedule for the day/time above). Thank you.

## 2020-11-09 NOTE — TELEPHONE ENCOUNTER
Can you please call  Ramos Mace and have him re-recheck a renal lab panel.  Can we set him up with an appointment with me at 11/16  At 12:30 or 11/24 at 10 am? Mongolian speaking, needs

## 2020-11-12 ENCOUNTER — HOSPITAL ENCOUNTER (OUTPATIENT)
Age: 40
Discharge: HOME | End: 2020-11-12

## 2020-11-12 LAB
A/G RATIO: 1.2 (ref 1.1–2.2)
ALBUMIN SERPL-MCNC: 4.5 G/DL (ref 3.4–5)
ALP BLD-CCNC: 89 U/L (ref 40–129)
ALT SERPL-CCNC: 23 U/L (ref 10–40)
ANION GAP SERPL CALCULATED.3IONS-SCNC: 11 MMOL/L (ref 3–16)
AST SERPL-CCNC: 20 U/L (ref 15–37)
BASOPHILS ABSOLUTE: 0 K/UL (ref 0–0.2)
BASOPHILS RELATIVE PERCENT: 0.7 %
BILIRUB SERPL-MCNC: 0.5 MG/DL (ref 0–1)
BUN BLDV-MCNC: 20 MG/DL (ref 7–20)
CALCIUM SERPL-MCNC: 10 MG/DL (ref 8.3–10.6)
CHLORIDE BLD-SCNC: 98 MMOL/L (ref 99–110)
CHOLESTEROL, FASTING: 193 MG/DL (ref 0–199)
CO2: 30 MMOL/L (ref 21–32)
CREAT SERPL-MCNC: 1 MG/DL (ref 0.9–1.3)
EOSINOPHILS ABSOLUTE: 0.2 K/UL (ref 0–0.6)
EOSINOPHILS RELATIVE PERCENT: 3.2 %
FERRITIN: 383.8 NG/ML (ref 30–400)
GFR AFRICAN AMERICAN: >60
GFR NON-AFRICAN AMERICAN: >60
GLOBULIN: 3.8 G/DL
GLUCOSE BLD-MCNC: 105 MG/DL (ref 70–99)
GLUCOSE FASTING: ABNORMAL MG/DL (ref 70–99)
HCT VFR BLD CALC: 49.3 % (ref 40.5–52.5)
HDLC SERPL-MCNC: 42 MG/DL (ref 40–60)
HEMOGLOBIN: 16.3 G/DL (ref 13.5–17.5)
LDL CHOLESTEROL CALCULATED: 126 MG/DL
LYMPHOCYTES ABSOLUTE: 1.7 K/UL (ref 1–5.1)
LYMPHOCYTES RELATIVE PERCENT: 24 %
MCH RBC QN AUTO: 28.4 PG (ref 26–34)
MCHC RBC AUTO-ENTMCNC: 33.1 G/DL (ref 31–36)
MCV RBC AUTO: 85.7 FL (ref 80–100)
MONOCYTES ABSOLUTE: 0.5 K/UL (ref 0–1.3)
MONOCYTES RELATIVE PERCENT: 7.5 %
NEUTROPHILS ABSOLUTE: 4.5 K/UL (ref 1.7–7.7)
NEUTROPHILS RELATIVE PERCENT: 64.6 %
PDW BLD-RTO: 14.8 % (ref 12.4–15.4)
PHOSPHORUS: 3.5 MG/DL (ref 2.5–4.9)
PLATELET # BLD: 226 K/UL (ref 135–450)
PMV BLD AUTO: 8.4 FL (ref 5–10.5)
POTASSIUM SERPL-SCNC: 4.5 MMOL/L (ref 3.5–5.1)
PRO-BNP: 897 PG/ML (ref 0–124)
RBC # BLD: 5.75 M/UL (ref 4.2–5.9)
SODIUM BLD-SCNC: 139 MMOL/L (ref 136–145)
TOTAL PROTEIN: 8.3 G/DL (ref 6.4–8.2)
TRIGLYCERIDE, FASTING: 126 MG/DL (ref 0–150)
VLDLC SERPL CALC-MCNC: 25 MG/DL
WBC # BLD: 7 K/UL (ref 4–11)

## 2020-11-12 PROCEDURE — 85025 COMPLETE CBC W/AUTO DIFF WBC: CPT

## 2020-11-12 PROCEDURE — 80053 COMPREHEN METABOLIC PANEL: CPT

## 2020-11-12 PROCEDURE — 83880 ASSAY OF NATRIURETIC PEPTIDE: CPT

## 2020-11-12 PROCEDURE — 82728 ASSAY OF FERRITIN: CPT

## 2020-11-12 PROCEDURE — 83036 HEMOGLOBIN GLYCOSYLATED A1C: CPT

## 2020-11-12 PROCEDURE — 86702 HIV-2 ANTIBODY: CPT

## 2020-11-12 PROCEDURE — 87390 HIV-1 AG IA: CPT

## 2020-11-12 PROCEDURE — 80061 LIPID PANEL: CPT

## 2020-11-12 PROCEDURE — 36415 COLL VENOUS BLD VENIPUNCTURE: CPT

## 2020-11-12 PROCEDURE — 86701 HIV-1ANTIBODY: CPT

## 2020-11-12 NOTE — RESULT ENCOUNTER NOTE
Please call Kassandra Eldon Hughes and let him know that his kidney function looks better. He can restart losartan at 50 mg once daily, which is half the dose he was previously taking. We will see him at his follow up appointment. Thanks!

## 2020-11-13 ENCOUNTER — TELEPHONE (OUTPATIENT)
Dept: CARDIOLOGY CLINIC | Age: 40
End: 2020-11-13

## 2020-11-13 PROBLEM — I42.9 CARDIOMYOPATHY (HCC): Status: ACTIVE | Noted: 2020-11-13

## 2020-11-13 LAB
ESTIMATED AVERAGE GLUCOSE: 226 MG/DL
HBA1C MFR BLD: 9.5 %
HIV AG/AB: NORMAL
HIV ANTIGEN: NORMAL
HIV-1 ANTIBODY: NORMAL
HIV-2 AB: NORMAL

## 2020-11-13 RX ORDER — LOSARTAN POTASSIUM 100 MG/1
50 TABLET ORAL DAILY
Qty: 45 TABLET | Refills: 3
Start: 2020-11-13 | End: 2021-01-25

## 2020-11-13 ASSESSMENT — ENCOUNTER SYMPTOMS
SHORTNESS OF BREATH: 1
COUGH: 0
BLOOD IN STOOL: 0
ABDOMINAL DISTENTION: 0
PHOTOPHOBIA: 0
CHEST TIGHTNESS: 0
ABDOMINAL PAIN: 0

## 2020-11-14 NOTE — PATIENT INSTRUCTIONS
1.  Schedule for coronary angiogram to check for blockages in your heart arteries  2. Bring pill bottles to your next appointment with Dr. Tana Meehan    Coronary angiogram   1. No food after midnight or at least 8 hours prior to angiogram  2. No fluid/drink after midnight or at least 8 hours prior to angiogram but can take medications with enough water to swallow them. Can take all morning medications, but would not take water pill (HCTZ) the morning of the angiogram  3. You will need someone to drive you home from the angiogram.  Limited lifting for one week after angiogram, but then can return to normal activities including work after one week. Patient Education        Coronary Angiogram with Possible Treatment: Before Your Procedure  What is a coronary angiogram?     A coronary angiogram is a test to look at the blood vessels of your heart. These are called the coronary arteries. You may have this test to see if any of these arteries are narrowed or blocked. The test may also be used to measure the pressure in your heart's chambers. A doctor will put a thin, flexible tube into a blood vessel in your upper leg or groin. This tube is called a catheter. In some cases, the doctor may insert it in a blood vessel near your elbow or wrist.  During the test, the doctor moves the catheter through the blood vessel and into your heart. Then the doctor puts a dye into the catheter. This makes your coronary arteries show up on a screen. Your doctor can see if the arteries are blocked or narrowed. If you have a narrowed or blocked artery, the doctor may do an angioplasty or a coronary stent procedure. In an angioplasty, the doctor puts a catheter with a tiny balloon at the tip into the blocked area and inflates it. The balloon presses the fatty buildup (plaque) against the walls of the artery. This makes more room for blood to flow. In most cases, the doctor then puts a stent in the artery.  A stent is a small tube made of wire mesh. It presses against the walls of the artery. The stent is left in the artery to keep it open. This helps blood flow. Follow-up care is a key part of your treatment and safety. Be sure to make and go to all appointments, and call your doctor if you are having problems. It's also a good idea to know your test results and keep a list of the medicines you take. How do you prepare for the procedure? Procedures can be stressful. This information will help you understand what you can expect. And it will help you safely prepare for your procedure. Preparing for the procedure    · Be sure you have someone to take you home. Anesthesia and pain medicine will make it unsafe for you to drive or get home on your own.     · Understand exactly what procedure is planned, along with the risks, benefits, and other options. · Tell your doctor ALL the medicines, vitamins, supplements, and herbal remedies you take. Some may increase the risk of problems during your procedure. Your doctor will tell you if you should stop taking any of them before the procedure and how soon to do it.     · If you take aspirin or some other blood thinner, ask your doctor if you should stop taking it before your procedure. Make sure that you understand exactly what your doctor wants you to do. These medicines increase the risk of bleeding.     · Make sure your doctor and the hospital have a copy of your advance directive. If you don't have one, you may want to prepare one. It lets others know your health care wishes. It's a good thing to have before any type of surgery or procedure. What happens on the day of the procedure? · Follow the instructions exactly about when to stop eating and drinking. If you don't, your procedure may be canceled. If your doctor told you to take your medicines on the day of the procedure, take them with only a sip of water.     · Take a bath or shower before you come in for your procedure.  Do not apply lotions, perfumes, deodorants, or nail polish.     · Do not shave the procedure site yourself.     · Take off all jewelry and piercings. And take out contact lenses, if you wear them. At the hospital or surgery center   · Bring a picture ID.     · You will be kept comfortable and safe by your anesthesia provider. You may get medicine that relaxes you or puts you in a light sleep. The area being worked on will be numb.     · After the procedure, pressure will be applied to the area where the catheter was put into your artery. Then you may have a bandage or a compression device on your groin or arm at the catheter insertion site. This will prevent bleeding.     · Nurses will check your heart rate and blood pressure. The nurse also will check the catheter site for bleeding.     · If the catheter was put in your groin, you will need to lie still and keep your leg straight for several hours. The nurse may put a weighted bag on your leg to help you keep it still.     · If the catheter was put in your arm, you may be able to sit up and get out of bed right away. But you will need to keep your arm still for at least one hour.     · You may be able to go home later the same day, or you may need to stay in the hospital overnight.     · You may have a bruise where the catheter was put in your groin or arm. This is normal and will go away. When should you call your doctor? · You have questions or concerns.     · You don't understand how to prepare for your procedure.     · You become ill before the procedure (such as fever, flu, or a cold).     · You need to reschedule or have changed your mind about having the procedure. Where can you learn more? Go to https://THE FASHIONhedy.[employer]. org and sign in to your [employer] account. Enter [id number] in the MTailor box to learn more about \"Coronary Angiogram with Possible Treatment: Before Your Procedure. \"     If you do not have an account, please click on the \"Sign Up Now\" link. Current as of: December 16, 2019               Content Version: 12.6  © 2006-2020 [employer]. Care instructions adapted under license by [facility] ([facility]). If you have questions about a medical condition or this instruction, always ask your healthcare professional. [address] any warranty or liability for your use of this information. Patient Education        Coronary Angiogram: What to Expect at Home  Your Recovery    A coronary angiogram is a test to examine the large blood vessel of your heart (coronary artery). The doctor inserted a thin, flexible tube (catheter) into a blood vessel in your groin. In some cases, the catheter is placed in a blood vessel in the arm. Your groin or arm may have a bruise and feel sore for a day or two after the procedure. You can do light activities around the house but nothing strenuous for several days. This care sheet gives you a general idea about how long it will take for you to recover. But each person recovers at a different pace. Follow the steps below to feel better as quickly as possible. How can you care for yourself at home? Activity    · If the doctor gave you a sedative:  ? For 24 hours, don't do anything that requires attention to detail, such as going to work, making important decisions, or signing any legal documents. It takes time for the medicine's effects to completely wear off.  ? For your safety, do not drive or operate any machinery that could be dangerous. Wait until the medicine wears off and you can think clearly and react easily.     · Do not do strenuous exercise and do not lift, pull, or push anything heavy until your doctor says it is okay. This may be for a day or two.  You can walk around the house and do light activity, such as cooking.     · If the catheter was placed in your groin, try not to walk up stairs for the first couple of days.     · If the catheter was placed in your arm near your wrist, do not bend your wrist deeply for the first couple of days. Be careful using your hand to get into and out of a chair or bed.     · If your doctor recommends it, get more exercise. Walking is a good choice. Bit by bit, increase the amount you walk every day. Try for at least 30 minutes on most days of the week. Diet    · Drink plenty of fluids to help your body flush out the dye. If you have kidney, heart, or liver disease and have to limit fluids, talk with your doctor before you increase the amount of fluids you drink.     · Keep eating a heart-healthy diet that has lots of fruits, vegetables, and whole grains. If you have not been eating this way, talk to your doctor. You also may want to talk to a dietitian. This expert can help you to learn about healthy foods and plan meals. Medicines    · Your doctor will tell you if and when you can restart your medicines. He or she will also give you instructions about taking any new medicines.     · If you take aspirin or some other blood thinner, ask your doctor if and when to start taking it again. Make sure that you understand exactly what your doctor wants you to do.     · Your doctor may prescribe a blood-thinning medicine like aspirin or clopidogrel (Plavix). It is very important that you take these medicines exactly as directed in order to keep the coronary artery open and reduce your risk of a heart attack. Be safe with medicines. Call your doctor if you think you are having a problem with your medicine. Care of the catheter site    · For 1 or 2 days, keep a bandage over the spot where the catheter was inserted. The bandage probably will fall off in this time.     · Put ice or a cold pack on the area for 10 to 20 minutes at a time to help with soreness or swelling. Put a thin cloth between the ice and your skin.     · You may shower 24 to 48 hours after the procedure, if your doctor okays it.  Pat the incision dry.     · Do not soak the catheter site until it is healed. Don't take a bath for 1 week, or until your doctor tells you it is okay.     · Watch for bleeding from the site. A small amount of blood (up to the size of a quarter) on the bandage can be normal.     · If you are bleeding, lie down and press on the area for 15 minutes to try to make it stop. If the bleeding does not stop, call your doctor or seek immediate medical care. Follow-up care is a key part of your treatment and safety. Be sure to make and go to all appointments, and call your doctor if you are having problems. It's also a good idea to know your test results and keep a list of the medicines you take. When should you call for help? Call 911 anytime you think you may need emergency care. For example, call if:    · You passed out (lost consciousness).     · You have severe trouble breathing.     · You have sudden chest pain and shortness of breath, or you cough up blood.     · You have symptoms of a heart attack. These may include:  ? Chest pain or pressure, or a strange feeling in the chest.  ? Sweating. ? Shortness of breath. ? Nausea or vomiting. ? Pain, pressure, or a strange feeling in the back, neck, jaw, or upper belly, or in one or both shoulders or arms. ? Lightheadedness or sudden weakness. ? A fast or irregular heartbeat. After you call 911, the  may tel you to chew 1 adult-strength or 2 to 4 low-dose aspirin. Wait for an ambulance. Do not try to drive yourself.     · You have been diagnosed with angina, and you have symptoms that do not go away with rest or are not getting better within 5 minutes after you take a dose of nitroglycerin. Call your doctor now or seek immediate medical care if:    · You are bleeding from the area where the catheter was put in your artery.     · You have a fast-growing, painful lump at the catheter site.     · You have signs of infection, such as:  ? Increased pain, swelling, warmth, or redness.   ? Red streaks leading from the catheter site. ? Pus draining from the catheter site. ? A fever.     · Your leg, arm, or hand is painful, looks blue, or feels cold, numb, or tingly. Watch closely for changes in your health, and be sure to contact your doctor if you have any problems. Where can you learn more? Go to https://Damage Houndspeholliseweb.ESCAPESwithYOU. org and sign in to your tripJane account. Enter I340 in the C3L3B Digital box to learn more about \"Coronary Angiogram: What to Expect at Home. \"     If you do not have an account, please click on the \"Sign Up Now\" link. Current as of: December 16, 2019               Content Version: 12.6  © 4097-4756 [employer]. Care instructions adapted under license by [facility] [place] ([facility]). If you have questions about a medical condition or this instruction, always ask your healthcare professional. [address] any warranty or liability for your use of this information.

## 2020-11-14 NOTE — PROGRESS NOTES
Constitutional: Positive for fatigue. Negative for activity change, diaphoresis and fever. HENT: Negative for congestion and ear discharge. Eyes: Negative for photophobia and visual disturbance. Respiratory: Positive for shortness of breath. Negative for cough and chest tightness. Cardiovascular: Negative for chest pain and palpitations. Gastrointestinal: Negative for abdominal distention, abdominal pain, blood in stool and nausea. Endocrine: Negative for cold intolerance and polydipsia. Genitourinary: Negative for difficulty urinating and flank pain. Musculoskeletal: Positive for arthralgias and myalgias. Skin: Positive for wound. Negative for rash. Allergic/Immunologic: Negative for environmental allergies and immunocompromised state. Neurological: Negative for dizziness and headaches. Hematological: Negative for adenopathy. Does not bruise/bleed easily. Psychiatric/Behavioral: Negative for confusion. The patient is not hyperactive. MEDICATIONS      Prior to Admission medications    Medication Sig Start Date End Date Taking?  Authorizing Provider   losartan (COZAAR) 100 MG tablet Take 0.5 tablets by mouth daily 11/13/20   Ever Cunha DO   aspirin EC 81 MG EC tablet Take 1 tablet by mouth daily 11/6/20   Wilmar Coates MD   blood glucose test strips (FREESTYLE LITE) strip 1 each by In Vitro route daily ICD code - Ell.9 11/6/20   Wilmar Coates MD   carvedilol (COREG) 12.5 MG tablet Take 1 tablet by mouth 2 times daily (with meals) 11/6/20   Wilmar Coates MD   glipiZIDE (GLUCOTROL) 5 MG tablet Take 3 tablets by mouth every morning (before breakfast) 11/6/20   Wilmar Coates MD   hydroCHLOROthiazide (HYDRODIURIL) 25 MG tablet Take 1 tablet by mouth daily 11/6/20   Wilmar Coates MD   metFORMIN (GLUCOPHAGE) 1000 MG tablet Take 1 tablet by mouth 2 times daily (with meals) 11/6/20   Wilmar Coates MD   NIFEdipine (ADALAT CC) 60 MG extended release tablet Take 1 tablet by mouth myocardium. There is prominent trabeculation predominately within the apex and along the lateral wall which also demonstrates mild dyskinesia. There is mild delayed    hyperenhancement throughout most of the LV myocardium with some degree of subendocardial sparing along the septum. There is focal transmural delayed hyperenhancement along the mid and apical inferior wall. These findings could represent an infiltrative    process such as myocarditis or sarcoidosis with a superimposed inferior wall ischemic process. The thinning of the lateral myocardium and prominent trabeculation also raises the possibility of noncompaction.         Global hypokinesis with ejection fraction of 39%. Ekg, notes, labs, imaging, and cardiac MRI personally reviewed  Moderate level of complexity given multisystem disease, drug-drug interactions, and high medical decision making given new diagnosis of cardiomyopathy, abnormal stress test, new diabetes, social barriers, and threat to life. ASSESSMENT AND PLAN     1. Cardiomyopathy:   - new problem  EF 39% MRI  Severe LVH w/ septal infiltrative process by ECHO 10/3/2020  Differential: uncontrolled hypertension, ischemia, infiltrative cardiomyopathy, amyloid, fabrys  - discussed MRI results with Dr. Ricardo Gordillo, may represent prior myocarditis vs infiltrative disorder. No active edema was seen on MRI. High degree of late-gadolinium enhancement. No indication of chagas disease  Plan:   - continue coreg 12.5 mg bid and losartan 50 mg once daily  - given abnormal stress test and new onset cardiomyopathy, will proceed with coronary angiogram to evaluate for ischemia. Discussed risks, benefits, and alternatives of angiogram including bleeding, stroke, heart attack, dialysis, paralysis, damage to nerves/arteries and veins  - no indication for ICD at this time as EF >35%  - will refer to heart failure for further evaluation.  If angiogram unrevealing, may need FDG-PET to evaluate for active condition vs endomyocardial biopsy     2. Hypertension  - improved  Plan:  - continue nifedipine and hctz    3. Diabetes mellitus, newly diagnosed: New PCP brielle't 11/4/2020.  -stable  Amputation right 3rd toe 10/5/2020. Foot wrapped with ACE/sock, wearing special shoe-brace. Takes oral meds, (stopped insulin 10/13/20 due to feeling shaky)   Now followed by Dr. Jie Benitez  Hemoglobin A1C 9.5 (11/12/20)    4. Care Coordination and social barriers  - needs insurance   - saw Dr. Jie Benitez  - voicemail left for financial assistance    Follow Up: 1 month    Dr. Rhoda Celaya     This note was scribed in the presence of Gina Rossi M.D. by Khadijah Land RN  Physician Attestation  The scribe for and in the presence of me Gina Rossi DO). The scribe Khadijah Land may have prepopulated components of this note with my historical  intellectual property under my direct supervision. Any additions to this intellectual property were performed in my presence and at my direction. Furthermore, the content and accuracy of this note have been reviewed by me Gina Rossi DO).   11/17/2020 8:09 AM

## 2020-11-16 ENCOUNTER — OFFICE VISIT (OUTPATIENT)
Dept: CARDIOLOGY CLINIC | Age: 40
End: 2020-11-16

## 2020-11-16 VITALS
RESPIRATION RATE: 18 BRPM | WEIGHT: 207 LBS | DIASTOLIC BLOOD PRESSURE: 62 MMHG | SYSTOLIC BLOOD PRESSURE: 110 MMHG | HEART RATE: 82 BPM | HEIGHT: 66 IN | BODY MASS INDEX: 33.27 KG/M2

## 2020-11-16 PROCEDURE — 99214 OFFICE O/P EST MOD 30 MIN: CPT | Performed by: INTERNAL MEDICINE

## 2020-11-16 ASSESSMENT — ENCOUNTER SYMPTOMS: NAUSEA: 0

## 2020-11-16 NOTE — LETTER
[address] [facility] [address] [place] [address] [address]  Phone: [phone]  Fax: [address], DO        November 16, 2020     Lore Rice, [address] [address]    Patient: Adri Chavez  MR Number: [id number]  YOB: 1980  Date of Visit: 11/16/2020    Dear Dr. Lore Rice:    Below are the relevant portions of my assessment and plan of care. [address]  11/13/20  Referring: Dr. Susan Bennett CONSULT/CHIEF COMPLAINT/HPI     Reason for visit/ Chief complaint  Follow up  Heart Failure   HPI Adri Chavez is a 45 y.o. male who is Grenadian speaking only (moved to the [address] [place] [address],[address] from [facility] in 2006). He was admitted earlier Oct 2020 with osteomyelitis of his right foot leading to amputation of 3rd digit on 10/5/2020. An ECHO 10/3/2020 revealed EF 40-45% with severe LVH. He was found to be hypertensive and diabetic requiring insulin during this admission. Prior to the current issues, he has never been in the hospital or treated for any medical problem. He lives with his sister's family. He has a 12year old daughter who lives with her mother. Today, he reports that he is feeling okay. No chest pain or pressure. He states that his right foot is healing and he is in a normal shoe today. No drainage, swelling, or pain. No orthopnea, pnd, palpitations, or chest pain. He reports that he obtained a new PCP (Dr. Sidney Ayala) and made some adjustments to his diabetic medications. He decreased his losartan as instructed and has been adherent with his medications. HISTORY/ALLERGIES/ROS     MedHx:   has no past medical history on file. SurgHx:  has a past surgical history that includes Toe amputation (Right, 10/5/2020). SocHx:   reports that he has quit smoking. His smoking use included cigarettes. He has a 2.50 pack-year smoking history.  He has never used smokeless tobacco. He reports current alcohol use of about 3.0 standard drinks of alcohol per week. He reports that he does not use drugs. FamHx:   His mother  age 70, had Diabetes; his father  age 48 of unknown cause but CPR was initiated without success. His 6 sisters/3 brothers are all healthy (one sister has Diabetes). His aunt has heart problems, has pacemaker. His grandparents' health are unknown; he never knew them. Allergies: Patient has no known allergies. ROS:   Review of Systems   Constitutional: Positive for fatigue. Negative for activity change, diaphoresis and fever. HENT: Negative for congestion and ear discharge. Eyes: Negative for photophobia and visual disturbance. Respiratory: Positive for shortness of breath. Negative for cough and chest tightness. Cardiovascular: Negative for chest pain and palpitations. Gastrointestinal: Negative for abdominal distention, abdominal pain, blood in stool and nausea. Endocrine: Negative for cold intolerance and polydipsia. Genitourinary: Negative for difficulty urinating and flank pain. Musculoskeletal: Positive for arthralgias and myalgias. Skin: Positive for wound. Negative for rash. Allergic/Immunologic: Negative for environmental allergies and immunocompromised state. Neurological: Negative for dizziness and headaches. Hematological: Negative for adenopathy. Does not bruise/bleed easily. Psychiatric/Behavioral: Negative for confusion. The patient is not hyperactive. MEDICATIONS      Prior to Admission medications    Medication Sig Start Date End Date Taking?  Authorizing Provider   losartan (COZAAR) 100 MG tablet Take 0.5 tablets by mouth daily 20   Chey Carty DO   aspirin EC 81 MG EC tablet Take 1 tablet by mouth daily 20   Frida Aguilar MD   blood glucose test strips (FREESTYLE LITE) strip 1 each by In Vitro route daily ICD code - Ell.9 20   Frida Aguilar MD carvedilol (COREG) 12.5 MG tablet Take 1 tablet by mouth 2 times daily (with meals) 11/6/20   Levi Murray MD   glipiZIDE (GLUCOTROL) 5 MG tablet Take 3 tablets by mouth every morning (before breakfast) 11/6/20   Levi Murray, MD   hydroCHLOROthiazide (HYDRODIURIL) 25 MG tablet Take 1 tablet by mouth daily 11/6/20   Levi Murray, MD   metFORMIN (GLUCOPHAGE) 1000 MG tablet Take 1 tablet by mouth 2 times daily (with meals) 11/6/20   Levi Murray, MD   NIFEdipine (ADALAT CC) 60 MG extended release tablet Take 1 tablet by mouth daily 11/6/20   Levi Peak, MD   potassium chloride (KLOR-CON M) 20 MEQ extended release tablet Take 1 tablet by mouth daily 11/6/20   Levi Murray, MD   Blood Glucose Monitoring Suppl (FREESTYLE FREEDOM LITE) w/Device KIT 1 kit by Does not apply route once for 1 dose 10/8/20 10/15/20  Mary Corley PA-C   Alcohol Swabs (ALCOHOL PREP) 70 % PADS 1 each by Does not apply route daily 10/8/20   Mary Corley PA-C     PHYSICAL EXAM        There were no vitals filed for this visit. Gen Alert, cooperative, no distress Heart  Regular rate and rhythm, +s4   Head Normocephalic, atraumatic, no abnormalities Abd  Soft, NT, +BS, no mass, no OM   Eyes PERRLA, conj/corn clear Ext  Foot in shoe   Nose Nares normal, no drain age, Non-tender Pulse 2+ and symmetric   Throat Lips, mucosa, tongue normal Skin Color/text/turg nl, no rash/lesions   Neck S/S, TM, NT, no bruit Psych Nl mood and affect   Lung  CTA-B, unlabored, no DTP     Ch wall NT, no deform       LABS and Imaging     Relevant and available CV data reviewed  Echo 10/3/2020: -Severe LVH with global hypokinesis and EF 40-45%. Septal endocardium is   very bright suggesting of an infiltrative process. (There is no EKG on the chart to evaluate for voltage). -Mild to moderate mitral regurgitation with moderately enlarged LA.    -IVC size is dilated (>2.1 cm) but collapses > 50% with respiration late-gadolinium enhancement. No indication of chagas disease  Plan:   - continue coreg 12.5 mg bid and losartan 50 mg once daily  - given abnormal stress test and new onset cardiomyopathy, will proceed with coronary angiogram to evaluate for ischemia. Discussed risks, benefits, and alternatives of angiogram including bleeding, stroke, heart attack, dialysis, paralysis, damage to nerves/arteries and veins  - no indication for ICD at this time as EF >35%  - will refer to heart failure for further evaluation. If angiogram unrevealing, may need FDG-PET to evaluate for active condition vs endomyocardial biopsy     2. Hypertension  - improved  Plan:  - continue nifedipine and hctz    3. Diabetes mellitus, newly diagnosed: New PCP brielle't 11/4/2020.  -stable  Amputation right 3rd toe 10/5/2020. Foot wrapped with ACE/sock, wearing special shoe-brace. Takes oral meds, (stopped insulin 10/13/20 due to feeling shaky)   Now followed by Dr. Andrae Benjamin  Hemoglobin A1C 9.5 (11/12/20)    4. Care Coordination and social barriers  - needs insurance   - saw Dr. Andrae Benjamin  - voicemail left for financial assistance    Follow Up: 1 month    Dr. Shaka Herrera     This note was scribed in the presence of Erin Rubalcava M.D. by Bruno Armstrong RN      If you have questions, please do not hesitate to call me. I look forward to following Seven along with you.     Sincerely,        Ruby Callahan, DO

## 2020-11-16 NOTE — PROGRESS NOTES
[facility]  Advanced CHF/Pulmonary Hypertension   Cardiac Evaluation      Nilton Stubbs  YOB: 1980    Date of Visit:  20    Chief Complaint   Patient presents with    New Patient      History of Present Illness: Congolese speaking only (moved to the [facility] from [facility] in ). Nilton Stubbs is a 45 y.o. male who presents from referral from Dr. aHnnah Reveles for consultation and management of CHF, new onset. He was admitted earlier Oct 2020 with osteomyelitis of his right foot leading to amputation of 3rd digit on 10/5/2020; it is healing well. An ECHO 10/3/2020 revealed EF 40-45% with severe LVH. He was found to be hypertensive and diabetic requiring insulin during this admission. Prior to the current issues, he has never been in the hospital or treated for any medical problem. Recent reduction in losartan due to elevated creatinine. He has a new PCP (Dr. Antoinette Arteaga) and made some adjustments to his diabetic medications. He lives with his sister's family; his niece Jacklyn Frankel helps with managing his care and appt's -- she speaks fluent [place]. He has a 12year old daughter who lives with her mother. His parents are ; he states they both had Diabetes but he is unsure of any other health issues.  Michell Dunne is present: Today, he states he has been feeling \"fine. \" He denies any breathing issues, no swelling. He denies exertional chest pain, KOO/PND, palpitations, light-headedness, edema. He states he takes all of his medications as prescribed. He has a LHC scheduled 12/3/2020 with Dr. Hannah Reveles. He states he has been walking.     No Known Allergies  Current Outpatient Medications   Medication Sig Dispense Refill    losartan (COZAAR) 100 MG tablet Take 0.5 tablets by mouth daily 45 tablet 3    aspirin EC 81 MG EC tablet Take 1 tablet by mouth daily 90 tablet 1    carvedilol (COREG) 12.5 MG tablet Take 1 tablet by mouth 2 times daily (with meals) 60 tablet 1    glipiZIDE (GLUCOTROL) 5 MG tablet Take 3 tablets by mouth every morning (before breakfast) 60 tablet 1    hydroCHLOROthiazide (HYDRODIURIL) 25 MG tablet Take 1 tablet by mouth daily 30 tablet 1    metFORMIN (GLUCOPHAGE) 1000 MG tablet Take 1 tablet by mouth 2 times daily (with meals) 60 tablet 1    NIFEdipine (ADALAT CC) 60 MG extended release tablet Take 1 tablet by mouth daily 30 tablet 1    potassium chloride (KLOR-CON M) 20 MEQ extended release tablet Take 1 tablet by mouth daily 60 tablet 1    blood glucose test strips (FREESTYLE LITE) strip 1 each by In Vitro route daily ICD code - Ell.9 100 each 3    Blood Glucose Monitoring Suppl (FREESTYLE FREEDOM LITE) w/Device KIT 1 kit by Does not apply route once for 1 dose 1 kit 0    Alcohol Swabs (ALCOHOL PREP) 70 % PADS 1 each by Does not apply route daily 100 each 3     No current facility-administered medications for this visit. No past medical history on file. Past Surgical History:   Procedure Laterality Date    TOE AMPUTATION Right 10/5/2020    RIGHT THIRD TOE AMPUTATION performed by Michelle Duggan DPM at [address] [place] [address],[address] [facility] History   Problem Relation Age of Onset    Diabetes Mother     Diabetes Father     Diabetes Sister      Social History     Socioeconomic History    Marital status: Single     Spouse name: Not on file    Number of children: Not on file    Years of education: Not on file    Highest education level: Not on file   Occupational History    Not on file   Social Needs    Financial resource strain: Not on file    Food insecurity     Worry: Not on file     Inability: Not on file    Transportation needs     Medical: Not on file     Non-medical: Not on file   Tobacco Use    Smoking status: Former Smoker     Packs/day: 0.25     Years: 10.00     Pack years: 2.50     Types: Cigarettes    Smokeless tobacco: Never Used   Substance and Sexual Activity    Alcohol use:  Yes     Alcohol/week: 3.0 standard drinks     Types: 3 Cans of beer per week    Drug use: Never    Sexual activity: Not on file   Lifestyle    Physical activity     Days per week: Not on file     Minutes per session: Not on file    Stress: Not on file   Relationships    Social connections     Talks on phone: Not on file     Gets together: Not on file     Attends [employer] service: Not on file     Active member of club or organization: Not on file     Attends meetings of clubs or organizations: Not on file     Relationship status: Not on file    Intimate partner violence     Fear of current or ex partner: Not on file     Emotionally abused: Not on file     Physically abused: Not on file     Forced sexual activity: Not on file   Other Topics Concern    Not on file   Social History Narrative    Not on file     Review of Systems:   · Constitutional: there has been no unanticipated weight loss. There's been no change in energy level, sleep pattern, or activity level. · Eyes: No visual changes or diplopia. No scleral icterus. · ENT: No Headaches, hearing loss or vertigo. No mouth sores or sore throat. · Cardiovascular: Reviewed in HPI  · Respiratory: No cough or wheezing, no sputum production. No hematemesis. · Gastrointestinal: No abdominal pain, appetite loss, blood in stools. No change in bowel or bladder habits. · Genitourinary: No dysuria, trouble voiding, or hematuria. · Musculoskeletal:  No gait disturbance, weakness or joint complaints. · Integumentary: No rash or pruritis. · Neurological: No headache, diplopia, change in muscle strength, numbness or tingling. No change in gait, balance, coordination, mood, affect, memory, mentation, behavior. · Psychiatric: No anxiety, no depression. · Endocrine: No malaise, fatigue or temperature intolerance. No excessive thirst, fluid intake, or urination. No tremor. · Hematologic/Lymphatic: No abnormal bruising or bleeding, blood clots or swollen lymph nodes. · Allergic/Immunologic: No nasal congestion or hives.     Physical Examination: Vitals:    11/20/20 1109 11/20/20 1112   BP: (!) 166/106 (!) 164/106   Pulse: 84    Weight: 205 lb (93 kg)    Height: 5' 6\" (1.676 m)      Body mass index is 33.09 kg/m². Wt Readings from Last 3 Encounters:   11/20/20 205 lb (93 kg)   11/16/20 207 lb (93.9 kg)   11/04/20 207 lb (93.9 kg)     BP Readings from Last 3 Encounters:   11/20/20 (!) 164/106   11/16/20 110/62   11/04/20 112/78     Constitutional and General Appearance:   WD/WN in NAD  HEENT:  NC/AT  TOMMY  No problems with hearing  Skin:  Warm, dry  Respiratory:  · Normal excursion and expansion without use of accessory muscles  · Resp Auscultation: Normal breath sounds without dullness  Cardiovascular:  · The apical impulses not displaced  · Heart tones are crisp and normal  · Cervical veins are not engorged  · The carotid upstroke is normal in amplitude and contour without delay or bruit  · JVP less than 8 cm H2O  RRR with nl S1 and S2 without m,r,g  · Peripheral pulses are symmetrical and full  · There is no clubbing, cyanosis of the extremities. · No edema  · Femoral Arteries: 2+ and equal  · Pedal Pulses: 2+ and equal   Neck:  · No thyromegaly  Abdomen:  · No masses or tenderness  · Liver/Spleen: No Abnormalities Noted  Neurological/Psychiatric:  · Alert and oriented in all spheres  · Moves all extremities well  · Exhibits normal gait balance and coordination  · No abnormalities of mood, affect, memory, mentation, or behavior are noted    Echo 10/3/2020: -Severe LVH with global hypokinesis and EF 40-45%. Septal endocardium is   very bright suggesting of an infiltrative process. (There is no EKG on the chart to evaluate for voltage).  -Mild to moderate mitral regurgitation with moderately enlarged LA.   -IVC size is dilated (>2.1 cm) but collapses > 50% with respiration consistent with elevated RA pressure (8 mmHg).          Cath: None  Holter: None  EKG 10/1/2020: NSR 86, LAFB, lateral infarct     Lexiscan heena 10/6/2020:   Small sized anterior and infero-basal minimally reversible defect of     moderate intensity. Dilated left ventricular with mildly reduced ejection fraction at 44%.          Overall findings represent a intermediate risk scan.         Stress Protocols          Resting ECG     Normal sinus rhythm. LVH.            Cardiac MRI w/wo contrast 11/6/20:  Mildly dilated left ventricle with diffuse thinning of the lateral wall of the LV myocardium. There is prominent trabeculation predominately within the apex and along the lateral wall which also demonstrates mild dyskinesia. There is mild delayed    hyperenhancement throughout most of the LV myocardium with some degree of subendocardial sparing along the septum. There is focal transmural delayed hyperenhancement along the mid and apical inferior wall. These findings could represent an infiltrative    process such as myocarditis or sarcoidosis with a superimposed inferior wall ischemic process. The thinning of the lateral myocardium and prominent trabeculation also raises the possibility of noncompaction.         Global hypokinesis with ejection fraction of 39%. Labs were reviewed including labs from other hospital systems through [facility]. Cardiac testing was reviewed including echos, nuclear scans, cardiac catheterization, including from other hospital systems through [facility]. Assessment:    1. Infiltrative cardiomyopathy ([address]    2. Essential hypertension    3. LVH (left ventricular hypertrophy)    4. SOB (shortness of breath)         1. Cardiomyopathy  Stable, compensated.   - Pro- (11/12/20)  - ECHO 10/3/20> EF 39%, severe LVH w/ septal infiltrative process    Differential: uncontrolled hypertension, ischemia, infiltrative cardiomyopathy, amyloid, hcm, fabrys, ischemia  - discussed MRI results with Dr. Pj Lam continue coreg 12.5 mg bid  - losartan decreased to 50 mg once daily due to elevated creatinine  - Per Dr. Ketan Diego note 11/16/20> He will need cardiac spent counseling and coordinating care for infiltrative cardiomyopathy, hypertension, heart failure using an .

## 2020-11-20 ENCOUNTER — HOSPITAL ENCOUNTER (OUTPATIENT)
Age: 40
Discharge: HOME | End: 2020-11-20

## 2020-11-20 ENCOUNTER — OFFICE VISIT (OUTPATIENT)
Dept: CARDIOLOGY CLINIC | Age: 40
End: 2020-11-20

## 2020-11-20 VITALS
WEIGHT: 205 LBS | DIASTOLIC BLOOD PRESSURE: 106 MMHG | BODY MASS INDEX: 32.95 KG/M2 | HEIGHT: 66 IN | SYSTOLIC BLOOD PRESSURE: 164 MMHG | HEART RATE: 84 BPM

## 2020-11-20 PROBLEM — I42.8 INFILTRATIVE CARDIOMYOPATHY (HCC): Status: ACTIVE | Noted: 2020-11-13

## 2020-11-20 LAB
FERRITIN: 354.7 NG/ML (ref 30–400)
IRON SATURATION: 31 % (ref 20–50)
IRON: 85 UG/DL (ref 59–158)
TOTAL IRON BINDING CAPACITY: 273 UG/DL (ref 260–445)
TSH REFLEX: 1.61 UIU/ML (ref 0.27–4.2)

## 2020-11-20 PROCEDURE — 99245 OFF/OP CONSLTJ NEW/EST HI 55: CPT | Performed by: INTERNAL MEDICINE

## 2020-11-20 PROCEDURE — 83883 ASSAY NEPHELOMETRY NOT SPEC: CPT

## 2020-11-20 PROCEDURE — 36415 COLL VENOUS BLD VENIPUNCTURE: CPT

## 2020-11-20 PROCEDURE — 84155 ASSAY OF PROTEIN SERUM: CPT

## 2020-11-20 PROCEDURE — 84165 PROTEIN E-PHORESIS SERUM: CPT

## 2020-11-20 PROCEDURE — 83550 IRON BINDING TEST: CPT

## 2020-11-20 PROCEDURE — 83540 ASSAY OF IRON: CPT

## 2020-11-20 PROCEDURE — 82728 ASSAY OF FERRITIN: CPT

## 2020-11-20 PROCEDURE — 84443 ASSAY THYROID STIM HORMONE: CPT

## 2020-11-20 NOTE — PATIENT INSTRUCTIONS
1. Urine collection x 24 hours: Urinate in the toilet first thing in the morning. Collect all urine for the rest of the day and night. The last collection will be the first urination the next morning. Then bring the jug to the lab. 2. Heart procedures schedule 12/3/2020.

## 2020-11-23 ENCOUNTER — HOSPITAL ENCOUNTER (OUTPATIENT)
Age: 40
Discharge: HOME | End: 2020-11-23

## 2020-11-23 LAB
24HR URINE VOLUME (ML): 3200 ML
ALBUMIN SERPL-MCNC: 3.6 G/DL (ref 3.1–4.9)
ALPHA-1-GLOBULIN: 0.3 G/DL (ref 0.2–0.4)
ALPHA-2-GLOBULIN: 1.1 G/DL (ref 0.4–1.1)
BETA GLOBULIN: 1.2 G/DL (ref 0.9–1.6)
GAMMA GLOBULIN: 1.6 G/DL (ref 0.6–1.8)
PROTEIN 24 HOUR URINE: 0.77 G/24HR
SPE/IFE INTERPRETATION: NORMAL
TOTAL PROTEIN: 7.8 G/DL (ref 6.4–8.2)

## 2020-11-23 PROCEDURE — 84166 PROTEIN E-PHORESIS/URINE/CSF: CPT

## 2020-11-23 PROCEDURE — 84156 ASSAY OF PROTEIN URINE: CPT

## 2020-11-24 ENCOUNTER — TELEPHONE (OUTPATIENT)
Dept: CARDIOLOGY CLINIC | Age: 40
End: 2020-11-24

## 2020-11-24 LAB
AFB CULTURE (MYCOBACTERIA): NORMAL
AFB SMEAR: NORMAL

## 2020-11-25 LAB
KAPPA, FREE LIGHT CHAINS, SERUM: 25.61 MG/L (ref 3.3–19.4)
KAPPA/LAMBDA RATIO: 1.17 (ref 0.26–1.65)
KAPPA/LAMBDA TEST COMMENT: ABNORMAL
LAMBDA, FREE LIGHT CHAINS, SERUM: 21.96 MG/L (ref 5.71–26.3)
URINE ELECTROPHORESIS INTERP: NORMAL

## 2020-12-02 ENCOUNTER — OFFICE VISIT (OUTPATIENT)
Dept: PRIMARY CARE CLINIC | Age: 40
End: 2020-12-02

## 2020-12-02 VITALS
BODY MASS INDEX: 33.83 KG/M2 | OXYGEN SATURATION: 98 % | SYSTOLIC BLOOD PRESSURE: 122 MMHG | WEIGHT: 209.6 LBS | HEART RATE: 91 BPM | DIASTOLIC BLOOD PRESSURE: 70 MMHG | TEMPERATURE: 97.4 F

## 2020-12-02 PROCEDURE — 99213 OFFICE O/P EST LOW 20 MIN: CPT | Performed by: INTERNAL MEDICINE

## 2020-12-02 RX ORDER — CARVEDILOL 12.5 MG/1
12.5 TABLET ORAL 2 TIMES DAILY WITH MEALS
Qty: 60 TABLET | Refills: 5 | Status: SHIPPED | OUTPATIENT
Start: 2020-12-02 | End: 2021-05-20

## 2020-12-02 RX ORDER — GLIPIZIDE 5 MG/1
5 TABLET ORAL
Qty: 60 TABLET | Refills: 5 | Status: SHIPPED | OUTPATIENT
Start: 2020-12-02 | End: 2021-06-01

## 2020-12-02 RX ORDER — NIFEDIPINE 60 MG/1
60 TABLET, FILM COATED, EXTENDED RELEASE ORAL DAILY
Qty: 30 TABLET | Refills: 5 | Status: SHIPPED | OUTPATIENT
Start: 2020-12-02 | End: 2021-06-01

## 2020-12-02 RX ORDER — HYDROCHLOROTHIAZIDE 25 MG/1
25 TABLET ORAL DAILY
Qty: 30 TABLET | Refills: 5 | Status: SHIPPED | OUTPATIENT
Start: 2020-12-02 | End: 2021-06-01

## 2020-12-02 ASSESSMENT — ENCOUNTER SYMPTOMS
DIARRHEA: 0
ABDOMINAL DISTENTION: 0
CHEST TIGHTNESS: 0
BACK PAIN: 0
SHORTNESS OF BREATH: 0
COUGH: 0
NAUSEA: 0
ABDOMINAL PAIN: 0

## 2020-12-02 ASSESSMENT — PATIENT HEALTH QUESTIONNAIRE - PHQ9
SUM OF ALL RESPONSES TO PHQ QUESTIONS 1-9: 0
1. LITTLE INTEREST OR PLEASURE IN DOING THINGS: 0
2. FEELING DOWN, DEPRESSED OR HOPELESS: 0
SUM OF ALL RESPONSES TO PHQ9 QUESTIONS 1 & 2: 0

## 2020-12-02 NOTE — PROGRESS NOTES
12/2/2020   Seven Galo  1980    The patients PMH, surgical history, family history, medications, allergies were all reviewed and updated as appropriate today. Current Outpatient Medications on File Prior to Visit   Medication Sig Dispense Refill    losartan (COZAAR) 100 MG tablet Take 0.5 tablets by mouth daily 45 tablet 3    aspirin EC 81 MG EC tablet Take 1 tablet by mouth daily 90 tablet 1    blood glucose test strips (FREESTYLE LITE) strip 1 each by In Vitro route daily ICD code - Ell.9 100 each 3    carvedilol (COREG) 12.5 MG tablet Take 1 tablet by mouth 2 times daily (with meals) 60 tablet 1    glipiZIDE (GLUCOTROL) 5 MG tablet Take 3 tablets by mouth every morning (before breakfast) 60 tablet 1    hydroCHLOROthiazide (HYDRODIURIL) 25 MG tablet Take 1 tablet by mouth daily 30 tablet 1    metFORMIN (GLUCOPHAGE) 1000 MG tablet Take 1 tablet by mouth 2 times daily (with meals) 60 tablet 1    NIFEdipine (ADALAT CC) 60 MG extended release tablet Take 1 tablet by mouth daily 30 tablet 1    potassium chloride (KLOR-CON M) 20 MEQ extended release tablet Take 1 tablet by mouth daily 60 tablet 1    Alcohol Swabs (ALCOHOL PREP) 70 % PADS 1 each by Does not apply route daily 100 each 3    Blood Glucose Monitoring Suppl (FREESTYLE FREEDOM LITE) w/Device KIT 1 kit by Does not apply route once for 1 dose 1 kit 0     No current facility-administered medications on file prior to visit. Chief Complaint   Patient presents with    Diabetes     f/u, had labs done, no refills needed, sugars are normal in the morning but throughout the day they drop low      REQUIRES -Kwame martinez    HPI:  Wants lab results printed from 1 week ago, , a1c 9.5 eAG 226  He believes that BS are TOO LOW during the day. .. Review of Systems   Constitutional: Negative for appetite change, chills, fatigue and fever. Respiratory: Negative for cough, chest tightness and shortness of breath. Cardiovascular: Negative for chest pain. Gastrointestinal: Negative for abdominal distention, abdominal pain, diarrhea and nausea. Genitourinary: Negative for difficulty urinating and dysuria. Musculoskeletal: Negative for back pain. Neurological: Negative for dizziness and headaches. Psychiatric/Behavioral: Negative for agitation and behavioral problems. The patient is not nervous/anxious. Wt Readings from Last 3 Encounters:   12/02/20 209 lb 9.6 oz (95.1 kg)   11/20/20 205 lb (93 kg)   11/16/20 207 lb (93.9 kg)       OBJECTIVE:  /70 (Site: Left Upper Arm, Position: Sitting, Cuff Size: Medium Adult)   Pulse 91   Temp 97.4 °F (36.3 °C) (Temporal)   Wt 209 lb 9.6 oz (95.1 kg)   SpO2 98%   BMI 33.83 kg/m²    Physical Exam  Vitals signs and nursing note reviewed. Constitutional:       General: He is not in acute distress. Appearance: Normal appearance. He is well-developed. HENT:      Head: Normocephalic and atraumatic. Right Ear: Tympanic membrane, ear canal and external ear normal.      Left Ear: Tympanic membrane, ear canal and external ear normal.      Nose: Nose normal. No rhinorrhea. Mouth/Throat:      Pharynx: No oropharyngeal exudate or posterior oropharyngeal erythema. Eyes:      General:         Right eye: No discharge. Left eye: No discharge. Extraocular Movements: Extraocular movements intact. Conjunctiva/sclera: Conjunctivae normal.      Pupils: Pupils are equal, round, and reactive to light. Neck:      Musculoskeletal: Normal range of motion. No muscular tenderness. Thyroid: No thyromegaly. Vascular: No carotid bruit or JVD. Cardiovascular:      Rate and Rhythm: Normal rate and regular rhythm. Pulses: Normal pulses. Heart sounds: Normal heart sounds. No murmur. Pulmonary:      Effort: Pulmonary effort is normal. No respiratory distress. Breath sounds: Normal breath sounds. No wheezing, rhonchi or rales.

## 2020-12-03 ENCOUNTER — HOSPITAL ENCOUNTER (OUTPATIENT)
Dept: CARDIAC CATH/INVASIVE PROCEDURES | Age: 40
Discharge: HOME | End: 2020-12-03
Attending: INTERNAL MEDICINE | Admitting: INTERNAL MEDICINE

## 2020-12-03 VITALS
OXYGEN SATURATION: 98 % | SYSTOLIC BLOOD PRESSURE: 167 MMHG | RESPIRATION RATE: 18 BRPM | BODY MASS INDEX: 32.95 KG/M2 | WEIGHT: 205 LBS | TEMPERATURE: 98 F | HEIGHT: 66 IN | HEART RATE: 85 BPM | DIASTOLIC BLOOD PRESSURE: 105 MMHG

## 2020-12-03 PROBLEM — R94.39 ABNORMAL STRESS TEST: Status: ACTIVE | Noted: 2020-12-03

## 2020-12-03 LAB
ANION GAP SERPL CALCULATED.3IONS-SCNC: 10 MMOL/L (ref 3–16)
BASOPHILS ABSOLUTE: 0.1 K/UL (ref 0–0.2)
BASOPHILS RELATIVE PERCENT: 0.8 %
BUN BLDV-MCNC: 31 MG/DL (ref 7–20)
CALCIUM SERPL-MCNC: 9.5 MG/DL (ref 8.3–10.6)
CHLORIDE BLD-SCNC: 100 MMOL/L (ref 99–110)
CO2: 29 MMOL/L (ref 21–32)
CREAT SERPL-MCNC: 1.1 MG/DL (ref 0.9–1.3)
EKG ATRIAL RATE: 85 BPM
EKG DIAGNOSIS: NORMAL
EKG P AXIS: 52 DEGREES
EKG P-R INTERVAL: 160 MS
EKG Q-T INTERVAL: 364 MS
EKG QRS DURATION: 112 MS
EKG QTC CALCULATION (BAZETT): 433 MS
EKG R AXIS: -74 DEGREES
EKG T AXIS: 35 DEGREES
EKG VENTRICULAR RATE: 85 BPM
EOSINOPHILS ABSOLUTE: 0.3 K/UL (ref 0–0.6)
EOSINOPHILS RELATIVE PERCENT: 2.7 %
GFR AFRICAN AMERICAN: >60
GFR NON-AFRICAN AMERICAN: >60
GLUCOSE BLD-MCNC: 122 MG/DL (ref 70–99)
HCT VFR BLD CALC: 43.1 % (ref 40.5–52.5)
HEMOGLOBIN: 14.5 G/DL (ref 13.5–17.5)
LYMPHOCYTES ABSOLUTE: 2 K/UL (ref 1–5.1)
LYMPHOCYTES RELATIVE PERCENT: 21 %
MCH RBC QN AUTO: 28.5 PG (ref 26–34)
MCHC RBC AUTO-ENTMCNC: 33.8 G/DL (ref 31–36)
MCV RBC AUTO: 84.5 FL (ref 80–100)
MONOCYTES ABSOLUTE: 0.8 K/UL (ref 0–1.3)
MONOCYTES RELATIVE PERCENT: 8.6 %
NEUTROPHILS ABSOLUTE: 6.5 K/UL (ref 1.7–7.7)
NEUTROPHILS RELATIVE PERCENT: 66.9 %
PDW BLD-RTO: 15.5 % (ref 12.4–15.4)
PLATELET # BLD: 236 K/UL (ref 135–450)
PMV BLD AUTO: 8.3 FL (ref 5–10.5)
POC ACT LR: 219 SEC
POTASSIUM SERPL-SCNC: 4.1 MMOL/L (ref 3.5–5.1)
PRO-BNP: 372 PG/ML (ref 0–124)
RBC # BLD: 5.1 M/UL (ref 4.2–5.9)
SODIUM BLD-SCNC: 139 MMOL/L (ref 136–145)
WBC # BLD: 9.7 K/UL (ref 4–11)

## 2020-12-03 PROCEDURE — C1894 INTRO/SHEATH, NON-LASER: HCPCS

## 2020-12-03 PROCEDURE — 83880 ASSAY OF NATRIURETIC PEPTIDE: CPT

## 2020-12-03 PROCEDURE — 80048 BASIC METABOLIC PNL TOTAL CA: CPT

## 2020-12-03 PROCEDURE — C1725 CATH, TRANSLUMIN NON-LASER: HCPCS

## 2020-12-03 PROCEDURE — 85025 COMPLETE CBC W/AUTO DIFF WBC: CPT

## 2020-12-03 PROCEDURE — 2500000003 HC RX 250 WO HCPCS

## 2020-12-03 PROCEDURE — C1887 CATHETER, GUIDING: HCPCS

## 2020-12-03 PROCEDURE — 99152 MOD SED SAME PHYS/QHP 5/>YRS: CPT

## 2020-12-03 PROCEDURE — 36415 COLL VENOUS BLD VENIPUNCTURE: CPT

## 2020-12-03 PROCEDURE — 85347 COAGULATION TIME ACTIVATED: CPT

## 2020-12-03 PROCEDURE — 93010 ELECTROCARDIOGRAM REPORT: CPT | Performed by: INTERNAL MEDICINE

## 2020-12-03 PROCEDURE — 6360000002 HC RX W HCPCS

## 2020-12-03 PROCEDURE — C1769 GUIDE WIRE: HCPCS

## 2020-12-03 PROCEDURE — 93005 ELECTROCARDIOGRAM TRACING: CPT | Performed by: INTERNAL MEDICINE

## 2020-12-03 PROCEDURE — 99153 MOD SED SAME PHYS/QHP EA: CPT

## 2020-12-03 PROCEDURE — 93458 L HRT ARTERY/VENTRICLE ANGIO: CPT

## 2020-12-03 PROCEDURE — 2709999900 HC NON-CHARGEABLE SUPPLY

## 2020-12-03 PROCEDURE — 6360000004 HC RX CONTRAST MEDICATION: Performed by: INTERNAL MEDICINE

## 2020-12-03 RX ADMIN — IOPAMIDOL 165 ML: 755 INJECTION, SOLUTION INTRAVENOUS at 13:14

## 2020-12-03 NOTE — SEDATION DOCUMENTATION
Brief Pre-Op Note/Sedation Assessment      Seven Young  1980  Room/bed info not found      [id number]  7:31 AM    Planned Procedure: Cardiac Catheterization, right heart catheterization and possible endomyocardial biopsy     Post Procedure Plan: Return to same level of care    Consent: I have discussed with the patient and/or the patient representative the indication, alternatives, and the possible risks and/or complications of the planned procedure and the anesthesia methods. The patient and/or patient representative appear to understand and agree to proceed. Interpretor was utilized. Discussed the risks, benefits, and alternatives of the procedure. Discussed the risks of bleeding, stroke, heart attack, dialysis, damage to nerves/arteries/veins/surrounding tissues, pericardial effusion, cardiac perforation and need for emergency surgery. Chief Complaint: NSTEMI      Indications for Cath Procedure:  Cardiomyopathy  Anginal Classification within 2 weeks:  CCS III - Symptoms with everyday living activities, i.e., moderate limitation  NYHA Heart Failure Class within 2 weeks: Class III - Symptoms of HF on less-than-ordinary exertion, Newly Diagnosed? Yes, Heart Failure Type: Systolic  Is Cath Lab Visit Valve-related?: No  Surgical Risk: Intermediate  Functional Type: >= 4 METS with symptoms    Anti- Anginal Meds within 2 weeks:   Yes: Beta Blockers and Aspirin    Stress or Imaging Studies Performed (within 6 months):  Stress Test with SPECT Result: Positive:  anterior and inferior Risk/Extent of Ischemia:  Intermediate     Vital Signs: There were no vitals taken for this visit.     Allergies:  No Known Allergies    Past Medical History:  Heart failure  Diabetes  Hypertension    Surgical History:  Past Surgical History:   Procedure Laterality Date    TOE AMPUTATION Right 10/5/2020    RIGHT THIRD TOE AMPUTATION performed by Ron Amato DPM at [facility] OR         Medications:  Current Outpatient Medications Medication Sig Dispense Refill    glipiZIDE (GLUCOTROL) 5 MG tablet Take 1 tablet by mouth 2 times daily (before meals) 60 tablet 5    NIFEdipine (ADALAT CC) 60 MG extended release tablet Take 1 tablet by mouth daily 30 tablet 5    hydroCHLOROthiazide (HYDRODIURIL) 25 MG tablet Take 1 tablet by mouth daily 30 tablet 5    metFORMIN (GLUCOPHAGE) 1000 MG tablet Take 1 tablet by mouth 2 times daily (with meals) 60 tablet 5    carvedilol (COREG) 12.5 MG tablet Take 1 tablet by mouth 2 times daily (with meals) 60 tablet 5    losartan (COZAAR) 100 MG tablet Take 0.5 tablets by mouth daily 45 tablet 3    aspirin EC 81 MG EC tablet Take 1 tablet by mouth daily 90 tablet 1    blood glucose test strips (FREESTYLE LITE) strip 1 each by In Vitro route daily ICD code - Ell.9 100 each 3    potassium chloride (KLOR-CON M) 20 MEQ extended release tablet Take 1 tablet by mouth daily 60 tablet 1    Blood Glucose Monitoring Suppl (FREESTYLE FREEDOM LITE) w/Device KIT 1 kit by Does not apply route once for 1 dose 1 kit 0    Alcohol Swabs (ALCOHOL PREP) 70 % PADS 1 each by Does not apply route daily 100 each 3     No current facility-administered medications for this encounter. Pre-Sedation:    Pre-Sedation Documentation and Exam:  I have personally completed a history, physical exam & review of systems for this patient (see notes). Prior History of Anesthesia Complications:   none    Modified Mallampati:  II (soft palate, uvula, fauces visible)    ASA Classification:  Class 2 - A normal healthy patient with mild systemic disease      Liborio Scale: Activity:  2 - Able to move 4 extremities voluntarily on command  Respiration:  2 - Able to breathe deeply and cough freely  Circulation:  2 - BP+/- 20mmHg of normal  Consciousness:  2 - Fully awake  Oxygen Saturation (color):  2 - Able to maintain oxygen saturation >92% on room air    Sedation/Anesthesia Plan:  Guard the patient's safety and welfare.   Minimize physical discomfort and pain. Minimize negative psychological responses to treatment by providing sedation and analgesia and maximize the potential amnesia. Patient to meet pre-procedure discharge plan.     Medication Planned:  midazolam intravenously and fentanyl intravenously    Patient is an appropriate candidate for plan of sedation: yes      Electronically signed by Lewis Real DO on 12/3/2020 at 7:31 AM

## 2020-12-03 NOTE — POST SEDATION
Patient:  Iwona Jasso   :   1980    Procedural Summary  ~Consent:   Obtained written and verbal consent      Risks/benefits explained in detail  ~Procedure:    Left Heart Catheterization  ~Medications:    Procedural sedation with minimal conscious sedation  ~Complications:   None  ~Blood Loss:    <10cc  ~Specimens:    None obtained  ~Pre-sedation re-evaluation: Performed immediately prior to procedure. ~Bleeding risk:  Low    Medication and Procedural Reconciliation:  An independent trained observer pushed medications at my direction. We monitored the patient's level of consciousness and vital signs/physiologic status throughout the procedure duration (see start and stop times below). Sedation: 2.5 mg Versed, 75 mcg Fentanyl  Sedation start: 1141  Sedation stop: 1303    Cardiac Cath :   Anatomy: Right dominant  Difficult engagement from radial approach, requiring AL2  LM- Patent   LAD-Patent  Cx-Patent   RCA-Patent  LVEDP- 15  /71    Intervention  Normal coronary arteries     Contrast: 165 ml   Flouro Time: 32.8  Access: Right radial artery    Impression  - Normal coronary arteries  - Difficult engagement of left main given aortic tortuosity and high takeoff, required AL2    Recommendation  ~ Aggressive treatment of non-ischemic cardiomyopathy   ~ Right heart cath and biopsy scheduled for 2020      Rosa Rodriguez MD 12/3/2020 1:36 PM

## 2020-12-03 NOTE — H&P
Resp 18   Ht 5' 6\" (1.676 m)   Wt 205 lb (93 kg)   SpO2 98%   BMI 33.09 kg/m²     EYES:  Lids and lashes normal, pupils equal, round and reactive to light, extra ocular muscles intact, sclera clear, conjunctiva normal  NECK:  Supple, symmetrical, trachea midline, no adenopathy, thyroid symmetric, not enlarged and no tenderness, skin normal  LUNGS:  No increased work of breathing, good air exchange, clear to auscultation bilaterally, no crackles or wheezing  CARDIOVASCULAR:  Normal apical impulse, regular rate and rhythm, normal S1 and S2, no S3 or S4, and no murmur noted  SKIN:  normal skin color, texture, turgor    Active Problems:  1. New onset cardiomyopathy and abnormal stress test  Plan:  - Discussed risks, benefits, and alteratives of the procedure and the patient has accepted the risks.  Will proceed with coronary angiogram, if normal will proceed with endomyocardial biopsy given concern for infiltrative cardiomyopathy

## 2021-01-13 ENCOUNTER — HOSPITAL ENCOUNTER (OUTPATIENT)
Dept: CARDIAC CATH/INVASIVE PROCEDURES | Age: 41
Discharge: HOME | End: 2021-01-13
Attending: INTERNAL MEDICINE | Admitting: INTERNAL MEDICINE

## 2021-01-13 VITALS
DIASTOLIC BLOOD PRESSURE: 101 MMHG | OXYGEN SATURATION: 99 % | WEIGHT: 205 LBS | SYSTOLIC BLOOD PRESSURE: 165 MMHG | HEART RATE: 86 BPM | TEMPERATURE: 98 F | HEIGHT: 66 IN | RESPIRATION RATE: 18 BRPM | BODY MASS INDEX: 32.95 KG/M2

## 2021-01-13 LAB
ANION GAP SERPL CALCULATED.3IONS-SCNC: 9 MMOL/L (ref 3–16)
BUN BLDV-MCNC: 26 MG/DL (ref 7–20)
CALCIUM SERPL-MCNC: 9.5 MG/DL (ref 8.3–10.6)
CHLORIDE BLD-SCNC: 103 MMOL/L (ref 99–110)
CO2: 26 MMOL/L (ref 21–32)
CREAT SERPL-MCNC: 0.9 MG/DL (ref 0.9–1.3)
GFR AFRICAN AMERICAN: >60
GFR NON-AFRICAN AMERICAN: >60
GLUCOSE BLD-MCNC: 84 MG/DL (ref 70–99)
HCT VFR BLD CALC: 40.8 % (ref 40.5–52.5)
HEMOGLOBIN: 13.7 G/DL (ref 13.5–17.5)
INR BLD: 0.91 (ref 0.86–1.14)
MCH RBC QN AUTO: 29.1 PG (ref 26–34)
MCHC RBC AUTO-ENTMCNC: 33.5 G/DL (ref 31–36)
MCV RBC AUTO: 86.9 FL (ref 80–100)
PDW BLD-RTO: 17.5 % (ref 12.4–15.4)
PLATELET # BLD: 266 K/UL (ref 135–450)
PMV BLD AUTO: 7.8 FL (ref 5–10.5)
POTASSIUM SERPL-SCNC: 4.4 MMOL/L (ref 3.5–5.1)
PRO-BNP: 326 PG/ML (ref 0–124)
PROTHROMBIN TIME: 10.5 SEC (ref 10–13.2)
RBC # BLD: 4.69 M/UL (ref 4.2–5.9)
SODIUM BLD-SCNC: 138 MMOL/L (ref 136–145)
WBC # BLD: 10.2 K/UL (ref 4–11)

## 2021-01-13 PROCEDURE — 93451 RIGHT HEART CATH: CPT

## 2021-01-13 PROCEDURE — 2709999900 HC NON-CHARGEABLE SUPPLY

## 2021-01-13 PROCEDURE — 93505 ENDOMYOCARDIAL BIOPSY: CPT

## 2021-01-13 PROCEDURE — 93451 RIGHT HEART CATH: CPT | Performed by: INTERNAL MEDICINE

## 2021-01-13 PROCEDURE — 6360000002 HC RX W HCPCS

## 2021-01-13 PROCEDURE — 85610 PROTHROMBIN TIME: CPT

## 2021-01-13 PROCEDURE — 99152 MOD SED SAME PHYS/QHP 5/>YRS: CPT | Performed by: INTERNAL MEDICINE

## 2021-01-13 PROCEDURE — 99205 OFFICE O/P NEW HI 60 MIN: CPT | Performed by: INTERNAL MEDICINE

## 2021-01-13 PROCEDURE — 93005 ELECTROCARDIOGRAM TRACING: CPT | Performed by: INTERNAL MEDICINE

## 2021-01-13 PROCEDURE — 80048 BASIC METABOLIC PNL TOTAL CA: CPT

## 2021-01-13 PROCEDURE — 93505 ENDOMYOCARDIAL BIOPSY: CPT | Performed by: INTERNAL MEDICINE

## 2021-01-13 PROCEDURE — 36415 COLL VENOUS BLD VENIPUNCTURE: CPT

## 2021-01-13 PROCEDURE — 83880 ASSAY OF NATRIURETIC PEPTIDE: CPT

## 2021-01-13 PROCEDURE — C1751 CATH, INF, PER/CENT/MIDLINE: HCPCS

## 2021-01-13 PROCEDURE — 2500000003 HC RX 250 WO HCPCS

## 2021-01-13 PROCEDURE — C1894 INTRO/SHEATH, NON-LASER: HCPCS

## 2021-01-13 PROCEDURE — 85027 COMPLETE CBC AUTOMATED: CPT

## 2021-01-13 PROCEDURE — 99153 MOD SED SAME PHYS/QHP EA: CPT

## 2021-01-13 PROCEDURE — 99152 MOD SED SAME PHYS/QHP 5/>YRS: CPT

## 2021-01-13 NOTE — PROCEDURES
Patient:  Yessenia Simms   :   1980    A pre-sedation re-evaluation was performed immediately prior to beginning of  the procedure. Procedure: Right Heart Catheterization, endomyocardial biopsy  Indication:  LVH, rule out HCM vs infiltrative CM  Medications: Procedural sedation with minimal conscious sedation  Complications: None  Estimated Blood Loss: none  Specimens: 10 endomyocardial biopsy specimens were obtained and sent to  pathology    An  was used to explain the procedure to the patient. He was given time to ask questions. Pressures were as follows:  RA: 5 mmHg  RV:  42/4 mmHg  PA:  38/15, mean 22 mmHg  PCWP:  9 mmHg  Thermodilution CO: 8.67   Thermodilution CI:  4.29  Shirley CO:  6.56  Shirley CI:  3.24  PA Sat:  74%  PVR:  120 dynes, 1.5 Wood units    An endomyocardial biopsy was performed via RIJ vein. 10 RV endomyocardial pieces were obtained and sent to  pathology. Procedural sedation:    An independent trained observer pushed medications at my direction. We monitored the patient's level of consciousness and vital signs/physiologic status throughout the procedure duration. Versed:  1.5 mg  Fentanyl:  75 mg  Start Time:  918  Stop Time:  1012  Fluoro Time: 3.5 minutes    Della Medication and Procedural Reconciliation:  I agree that the documented medications and procedures performed are true. The medications were given under my order. The procedures were performed under my direct supervision. Impression:  1. Normal filling pressures  2. Normal cardiac output  3. No evidence of pulmonary artery hypertension    Plan:  1. Await endomyocardial biopsy results  2.   Continue current meds      Mainor Scott M.D., [address] [place] [address]

## 2021-01-13 NOTE — PRE SEDATION
Sedation Pre-Procedure Note    Patient Name: Carli Ramos   YOB: 1980  Room/Bed: Cath/NONE  Medical Record Number: [id number]  Date: 1/13/2021   Time: 9:26 AM       Indication:  LVH, infiltrative cardiomyopathy    Consent: I have discussed with the patient and/or the patient representative the indication, alternatives, and the possible risks and/or complications of the planned procedure and the anesthesia methods. The patient and/or patient representative appear to understand and agree to proceed. Vital Signs:   Vitals:    01/13/21 0825   BP: (!) 165/101   Pulse: 86   Resp: 18   Temp: 98 °F (36.7 °C)   SpO2: 99%       Past Medical History:   has no past medical history on file. Past Surgical History:   has a past surgical history that includes Toe amputation (Right, 10/5/2020). Medications:   Scheduled Meds:   Continuous Infusions:   PRN Meds:   Home Meds:   Prior to Admission medications    Medication Sig Start Date End Date Taking?  Authorizing Provider   glipiZIDE (GLUCOTROL) 5 MG tablet Take 1 tablet by mouth 2 times daily (before meals) 12/2/20  Yes Romy Mccoy MD   NIFEdipine (ADALAT CC) 60 MG extended release tablet Take 1 tablet by mouth daily 12/2/20  Yes Romy Mccoy MD   hydroCHLOROthiazide (HYDRODIURIL) 25 MG tablet Take 1 tablet by mouth daily 12/2/20  Yes Romy Mccoy MD   metFORMIN (GLUCOPHAGE) 1000 MG tablet Take 1 tablet by mouth 2 times daily (with meals) 12/2/20  Yes Romy Mccoy MD   carvedilol (COREG) 12.5 MG tablet Take 1 tablet by mouth 2 times daily (with meals) 12/2/20  Yes Romy Mccoy MD   losartan (COZAAR) 100 MG tablet Take 0.5 tablets by mouth daily 11/13/20  Yes Sheeba Goldsmith DO   aspirin EC 81 MG EC tablet Take 1 tablet by mouth daily 11/6/20  Yes Romy Mccoy MD   potassium chloride (KLOR-CON M) 20 MEQ extended release tablet Take 1 tablet by mouth daily 11/6/20  Yes Romy Mccoy MD

## 2021-01-13 NOTE — H&P
 Diabetes Sister      Social History     Socioeconomic History    Marital status: Single     Spouse name: Not on file    Number of children: Not on file    Years of education: Not on file    Highest education level: Not on file   Occupational History    Not on file   Social Needs    Financial resource strain: Not on file    Food insecurity     Worry: Not on file     Inability: Not on file    Transportation needs     Medical: Not on file     Non-medical: Not on file   Tobacco Use    Smoking status: Former Smoker     Packs/day: 0.25     Years: 10.00     Pack years: 2.50     Types: Cigarettes    Smokeless tobacco: Never Used   Substance and Sexual Activity    Alcohol use: Yes     Alcohol/week: 3.0 standard drinks     Types: 3 Cans of beer per week    Drug use: Never    Sexual activity: Not on file   Lifestyle    Physical activity     Days per week: Not on file     Minutes per session: Not on file    Stress: Not on file   Relationships    Social connections     Talks on phone: Not on file     Gets together: Not on file     Attends Confucianism service: Not on file     Active member of club or organization: Not on file     Attends meetings of clubs or organizations: Not on file     Relationship status: Not on file    Intimate partner violence     Fear of current or ex partner: Not on file     Emotionally abused: Not on file     Physically abused: Not on file     Forced sexual activity: Not on file   Other Topics Concern    Not on file   Social History Narrative    Not on file     Review of Systems:   · Constitutional: there has been no unanticipated weight loss. There's been no change in energy level, sleep pattern, or activity level. · Eyes: No visual changes or diplopia. No scleral icterus. · ENT: No Headaches, hearing loss or vertigo. No mouth sores or sore throat. · Cardiovascular: Reviewed in HPI  · Respiratory: No cough or wheezing, no sputum production. No hematemesis. · Gastrointestinal: No abdominal pain, appetite loss, blood in stools. No change in bowel or bladder habits. · Genitourinary: No dysuria, trouble voiding, or hematuria. · Musculoskeletal:  No gait disturbance, weakness or joint complaints. · Integumentary: No rash or pruritis. · Neurological: No headache, diplopia, change in muscle strength, numbness or tingling. No change in gait, balance, coordination, mood, affect, memory, mentation, behavior. · Psychiatric: No anxiety, no depression. · Endocrine: No malaise, fatigue or temperature intolerance. No excessive thirst, fluid intake, or urination. No tremor. · Hematologic/Lymphatic: No abnormal bruising or bleeding, blood clots or swollen lymph nodes. · Allergic/Immunologic: No nasal congestion or hives. Physical Examination:    Vitals:    01/13/21 0825   BP: (!) 165/101   Pulse: 86   Resp: 18   Temp: 98 °F (36.7 °C)   SpO2: 99%   Weight: 205 lb (93 kg)   Height: 5' 6\" (1.676 m)     Body mass index is 33.09 kg/m². Wt Readings from Last 3 Encounters:   01/13/21 205 lb (93 kg)   12/03/20 205 lb (93 kg)   12/02/20 209 lb 9.6 oz (95.1 kg)     BP Readings from Last 3 Encounters:   01/13/21 (!) 165/101   12/03/20 (!) 167/105   12/02/20 122/70     Constitutional and General Appearance:   WD/WN in NAD  HEENT:  NC/AT  TOMMY  No problems with hearing  Skin:  Warm, dry  Respiratory:  · Normal excursion and expansion without use of accessory muscles  · Resp Auscultation: Normal breath sounds without dullness  Cardiovascular:  · The apical impulses not displaced  · Heart tones are crisp and normal  · Cervical veins are not engorged  · The carotid upstroke is normal in amplitude and contour without delay or bruit  · JVP less than 8 cm H2O  RRR with nl S1 and S2 without m,r,g  · Peripheral pulses are symmetrical and full  · There is no clubbing, cyanosis of the extremities.   · No edema  · Femoral Arteries: 2+ and equal  · Pedal Pulses: 2+ and equal   Neck: · No thyromegaly  Abdomen:  · No masses or tenderness  · Liver/Spleen: No Abnormalities Noted  Neurological/Psychiatric:  · Alert and oriented in all spheres  · Moves all extremities well  · Exhibits normal gait balance and coordination  · No abnormalities of mood, affect, memory, mentation, or behavior are noted    Echo 10/3/2020: -Severe LVH with global hypokinesis and EF 40-45%. Septal endocardium is   very bright suggesting of an infiltrative process. (There is no EKG on the chart to evaluate for voltage).  -Mild to moderate mitral regurgitation with moderately enlarged LA.   -IVC size is dilated (>2.1 cm) but collapses > 50% with respiration consistent with elevated RA pressure (8 mmHg).    Cath: None  Holter: None  EKG 10/1/2020: NSR 86, LAFB, lateral infarct     Lexiscan heena 10/6/2020:   Small sized anterior and infero-basal minimally reversible defect of     moderate intensity. Dilated left ventricular with mildly reduced ejection fraction at 44%.          Overall findings represent a intermediate risk scan.         Stress Protocols          Resting ECG     Normal sinus rhythm. LVH.            Cardiac MRI w/wo contrast 11/6/20:  Mildly dilated left ventricle with diffuse thinning of the lateral wall of the LV myocardium. There is prominent trabeculation predominately within the apex and along the lateral wall which also demonstrates mild dyskinesia. There is mild delayed    hyperenhancement throughout most of the LV myocardium with some degree of subendocardial sparing along the septum. There is focal transmural delayed hyperenhancement along the mid and apical inferior wall. These findings could represent an infiltrative    process such as myocarditis or sarcoidosis with a superimposed inferior wall ischemic process. The thinning of the lateral myocardium and prominent trabeculation also raises the possibility of noncompaction.         Global hypokinesis with ejection fraction of 39%. Labs were reviewed including labs from other hospital systems through [facility]. Cardiac testing was reviewed including echos, nuclear scans, cardiac catheterization, including from other hospital systems through [facility]. Assessment:        1. Cardiomyopathy  Stable, compensated. - Pro- (11/12/20)  - ECHO 10/3/20> EF 39%, severe LVH w/ septal infiltrative process    Differential: uncontrolled hypertension, ischemia, infiltrative cardiomyopathy, amyloid, hcm, fabrys, ischemia  - discussed MRI results with Dr. Cedrick Knowles continue coreg 12.5 mg bid  - losartan decreased to 50 mg once daily due to elevated creatinine  - Per Dr. Dia Sorto note 11/16/20> He will need cardiac cath when infection controlled given positive stress test. Patient is currently asymptomatic on medical therapy with betablocker. Baby aspirin was added to his regimen.     2. Hypertension  Elevated. 144/90 on recheck by me. He took his meds this morning. Blood pressure (!) 165/101, pulse 86, temperature 98 °F (36.7 °C), resp. rate 18, height 5' 6\" (1.676 m), weight 205 lb (93 kg), SpO2 99 %.     3. Diabetes mellitus, newly diagnosed: New PCP Dr. Natalia Marroquin, saw him 11/4/2020. Amputation right 3rd toe 10/5/2020. Foot wrapped with ACE/sock, wearing special shoe-brace. Takes oral meds, (stopped insulin 10/13/20 due to feeling shaky)   Hemoglobin A1C 9.5 (11/12/20)         Plan:  1. Proceed with right heart cath and biopsy today. I appreciate the opportunity of cooperating in the care of this patient.     Jaycee Gonzalez M.D., [facility] [place]

## 2021-01-14 LAB
EKG ATRIAL RATE: 86 BPM
EKG DIAGNOSIS: NORMAL
EKG P AXIS: 49 DEGREES
EKG P-R INTERVAL: 160 MS
EKG Q-T INTERVAL: 352 MS
EKG QRS DURATION: 102 MS
EKG QTC CALCULATION (BAZETT): 421 MS
EKG R AXIS: -64 DEGREES
EKG T AXIS: 34 DEGREES
EKG VENTRICULAR RATE: 86 BPM

## 2021-01-15 ENCOUNTER — TELEPHONE (OUTPATIENT)
Dept: PRIMARY CARE CLINIC | Age: 41
End: 2021-01-15

## 2021-01-20 NOTE — PROGRESS NOTES
[facility]  Advanced CHF/Pulmonary Hypertension   Cardiac Evaluation      Jane Avila  YOB: 1980    Date of Visit:  21    Chief Complaint   Patient presents with    Cardiomyopathy     no cardiac complaints at this time    Follow-up     Cath      History of Present Illness: Lao speaking only (moved to the [address] [place] [address],[address] from [facility] in ). Jane Avila is a 45 y.o. male who presents from referral from Dr. Rayshawn Merritt for consultation and management of CHF, new onset. He was admitted earlier Oct 2020 with osteomyelitis of his right foot leading to amputation of 3rd digit on 10/5/2020; it is healing well. An ECHO 10/3/2020 revealed EF 40-45% with severe LVH. He was found to be hypertensive and diabetic requiring insulin during this admission. Prior to the current issues, he has never been in the hospital or treated for any medical problem. Recent reduction in losartan due to elevated creatinine. He has a new PCP (Dr. Arlin Flores) and made some adjustments to his diabetic medications. He lives with his sister's family; his niece Erica Flores helps with managing his care and appt's -- she speaks fluent [place]. He has a 12year old daughter who lives with her mother. His parents are ; he states they both had Diabetes but he is unsure of any other health issues. He had a LHC on 12/3/2020 with Dr. Adri Salinas which showed normal coronary arteries. [address] on 21 showed no evidence of pulmonary artery hypertension. Myocardial biopsy on 21 was negative for Amyloid, Granulomas and Inflammation. He denies problems with his neck from the [address] He is here with his niece, Lc Hanson (lives with her and her sister Maurice Buerger). I have explained that he has no abnormal proteins on the biopsy. He may have a hereditary condition treated with medications. He denies family members with heart conditions. His dad was diabetic and  at 48 but not of heart problems. He has one daughter age 12 in [facility].  I have suggested when she is older that she gets an echo. He is not working right now. He is taking medications. We discussed in detail that he likely has hypertrophic cardiomyopathy and that this condition could be inherited.         No Known Allergies  Current Outpatient Medications   Medication Sig Dispense Refill    glipiZIDE (GLUCOTROL) 5 MG tablet Take 1 tablet by mouth 2 times daily (before meals) 60 tablet 5    NIFEdipine (ADALAT CC) 60 MG extended release tablet Take 1 tablet by mouth daily 30 tablet 5    hydroCHLOROthiazide (HYDRODIURIL) 25 MG tablet Take 1 tablet by mouth daily 30 tablet 5    metFORMIN (GLUCOPHAGE) 1000 MG tablet Take 1 tablet by mouth 2 times daily (with meals) 60 tablet 5    carvedilol (COREG) 12.5 MG tablet Take 1 tablet by mouth 2 times daily (with meals) 60 tablet 5    losartan (COZAAR) 100 MG tablet Take 0.5 tablets by mouth daily 45 tablet 3    aspirin EC 81 MG EC tablet Take 1 tablet by mouth daily 90 tablet 1    blood glucose test strips (FREESTYLE LITE) strip 1 each by In Vitro route daily ICD code - Ell.9 100 each 3    potassium chloride (KLOR-CON M) 20 MEQ extended release tablet Take 1 tablet by mouth daily 60 tablet 1    Alcohol Swabs (ALCOHOL PREP) 70 % PADS 1 each by Does not apply route daily 100 each 3  Blood Glucose Monitoring Suppl (FREESTYLE FREEDOM LITE) w/Device KIT 1 kit by Does not apply route once for 1 dose 1 kit 0     No current facility-administered medications for this visit. History reviewed. No pertinent past medical history. Past Surgical History:   Procedure Laterality Date    TOE AMPUTATION Right 10/5/2020    RIGHT THIRD TOE AMPUTATION performed by Elier Mendez DPM at [address] [facility] [facility] History   Problem Relation Age of Onset    Diabetes Mother     Diabetes Father     Diabetes Sister      Social History     Socioeconomic History    Marital status: Single     Spouse name: Not on file    Number of children: Not on file    Years of education: Not on file    Highest education level: Not on file   Occupational History    Not on file   Social Needs    Financial resource strain: Not on file    Food insecurity     Worry: Not on file     Inability: Not on file    Transportation needs     Medical: Not on file     Non-medical: Not on file   Tobacco Use    Smoking status: Former Smoker     Packs/day: 0.25     Years: 10.00     Pack years: 2.50     Types: Cigarettes    Smokeless tobacco: Never Used   Substance and Sexual Activity    Alcohol use:  Yes     Alcohol/week: 3.0 standard drinks     Types: 3 Cans of beer per week     Comment: occasional    Drug use: Never    Sexual activity: Not on file   Lifestyle    Physical activity     Days per week: Not on file     Minutes per session: Not on file    Stress: Not on file   Relationships    Social connections     Talks on phone: Not on file     Gets together: Not on file     Attends Episcopal service: Not on file     Active member of club or organization: Not on file     Attends meetings of clubs or organizations: Not on file     Relationship status: Not on file    Intimate partner violence     Fear of current or ex partner: Not on file     Emotionally abused: Not on file     Physically abused: Not on file Forced sexual activity: Not on file   Other Topics Concern    Not on file   Social History Narrative    Not on file     Review of Systems:   · Constitutional: there has been no unanticipated weight loss. There's been no change in energy level, sleep pattern, or activity level. · Eyes: No visual changes or diplopia. No scleral icterus. · ENT: No Headaches, hearing loss or vertigo. No mouth sores or sore throat. · Cardiovascular: Reviewed in HPI  · Respiratory: No cough or wheezing, no sputum production. No hematemesis. · Gastrointestinal: No abdominal pain, appetite loss, blood in stools. No change in bowel or bladder habits. · Genitourinary: No dysuria, trouble voiding, or hematuria. · Musculoskeletal:  No gait disturbance, weakness or joint complaints. · Integumentary: No rash or pruritis. · Neurological: No headache, diplopia, change in muscle strength, numbness or tingling. No change in gait, balance, coordination, mood, affect, memory, mentation, behavior. · Psychiatric: No anxiety, no depression. · Endocrine: No malaise, fatigue or temperature intolerance. No excessive thirst, fluid intake, or urination. No tremor. · Hematologic/Lymphatic: No abnormal bruising or bleeding, blood clots or swollen lymph nodes. · Allergic/Immunologic: No nasal congestion or hives. Physical Examination:    Vitals:    01/21/21 1308   BP: 112/70   Site: Right Upper Arm   Position: Sitting   Cuff Size: Large Adult   Pulse: 87   SpO2: 96%   Weight: 209 lb 12.8 oz (95.2 kg)   Height: 5' 6\" (1.676 m)     Body mass index is 33.86 kg/m².      Wt Readings from Last 3 Encounters:   01/21/21 209 lb 12.8 oz (95.2 kg)   01/13/21 205 lb (93 kg)   12/03/20 205 lb (93 kg)     BP Readings from Last 3 Encounters:   01/21/21 112/70   01/13/21 (!) 165/101   12/03/20 (!) 167/105     Constitutional and General Appearance:   WD/WN in NAD  HEENT:  NC/AT  TOMMY  No problems with hearing  Skin:  Warm, dry  Respiratory: · Normal excursion and expansion without use of accessory muscles  · Resp Auscultation: Normal breath sounds without dullness  Cardiovascular:  · The apical impulses not displaced  · Heart tones are crisp and normal  · Cervical veins are not engorged  · The carotid upstroke is normal in amplitude and contour without delay or bruit  · JVP less than 8 cm H2O  RRR with nl S1 and S2 without m,r,g  · Peripheral pulses are symmetrical and full  · There is no clubbing, cyanosis of the extremities. · No edema  · Femoral Arteries: 2+ and equal  · Pedal Pulses: 2+ and equal   Neck:  · No thyromegaly  Abdomen:  · No masses or tenderness  · Liver/Spleen: No Abnormalities Noted  Neurological/Psychiatric:  · Alert and oriented in all spheres  · Moves all extremities well  · Exhibits normal gait balance and coordination  · No abnormalities of mood, affect, memory, mentation, or behavior are noted    RHC/Biopsy 1/13/21  An  was used to explain the procedure to the patient. He was given time to ask questions.     Pressures were as follows:  RA: 5 mmHg  RV:  42/4 mmHg  PA:  38/15, mean 22 mmHg  PCWP:  9 mmHg  Thermodilution CO: 8.67   Thermodilution CI:  4.29  Shirley CO:  6.56  Shirley CI:  3.24  PA Sat:  74%  PVR:  120 dynes, 1.5 Wood units     An endomyocardial biopsy was performed via RIJ vein. 10 RV endomyocardial pieces were obtained and sent to  pathology. Impression:  1. Normal filling pressures  2. Normal cardiac output  3. No evidence of pulmonary artery hypertension   Plan:  1. Await endomyocardial biopsy results (Negative for Amyloid, Granulomas and Inflammation)   2. Vannessa Santo M.D., [facility]     12/3/2020 [facility]  Cardiac Cath :   Anatomy: Right dominant  Difficult engagement from radial approach, requiring AL2  LM- Patent   LAD-Patent  Cx-Patent   RCA-Patent  LVEDP- 15  /71     Intervention  Normal coronary arteries      Contrast: 165 ml Flouro Time: 32.8  Access: Right radial artery     Impression  - Normal coronary arteries  - Difficult engagement of left main given aortic tortuosity and high takeoff, required AL2     Recommendation  ~ Aggressive treatment of non-ischemic cardiomyopathy   ~ Right heart cath and biopsy scheduled for 1/13/2020      Echo 10/3/2020: -Severe LVH with global hypokinesis and EF 40-45%. Septal endocardium is   very bright suggesting of an infiltrative process. (There is no EKG on the chart to evaluate for voltage).  -Mild to moderate mitral regurgitation with moderately enlarged LA.   -IVC size is dilated (>2.1 cm) but collapses > 50% with respiration consistent with elevated RA pressure (8 mmHg).    Cath: None  Holter: None  EKG 10/1/2020: NSR 86, LAFB, lateral infarct     Lexiscan heena 10/6/2020:   Small sized anterior and infero-basal minimally reversible defect of     moderate intensity. Dilated left ventricular with mildly reduced ejection fraction at 44%.          Overall findings represent a intermediate risk scan.         Stress Protocols          Resting ECG     Normal sinus rhythm. LVH.            Cardiac MRI w/wo contrast 11/6/20:  Mildly dilated left ventricle with diffuse thinning of the lateral wall of the LV myocardium. There is prominent trabeculation predominately within the apex and along the lateral wall which also demonstrates mild dyskinesia. There is mild delayed    hyperenhancement throughout most of the LV myocardium with some degree of subendocardial sparing along the septum. There is focal transmural delayed hyperenhancement along the mid and apical inferior wall. These findings could represent an infiltrative    process such as myocarditis or sarcoidosis with a superimposed inferior wall ischemic process. The thinning of the lateral myocardium and prominent trabeculation also raises the possibility of noncompaction.         Global hypokinesis with ejection fraction of 39%. Labs were reviewed including labs from other hospital systems through [facility]. Cardiac testing was reviewed including echos, nuclear scans, cardiac catheterization, including from other hospital systems through [facility]. Assessment:    1. Hypertrophic cardiomyopathy ([address]    2. LVH (left ventricular hypertrophy)    3. Essential hypertension    4. SOB (shortness of breath)    5. Uses Portuguese as primary spoken language         1. Hypertrophic Cardiomyopathy  Stable, compensated. - Pro-> 326 1/13/21  - ECHO 10/3/20> EF 39%, severe LVH w/ septal infiltrative process. - continue coreg 12.5 mg bid, Losartan, HCTZ.      2. Hypertension: /70 (Site: Right Upper Arm, Position: Sitting, Cuff Size: Large Adult)   Pulse 87   Ht 5' 6\" (1.676 m)   Wt 209 lb 12.8 oz (95.2 kg)   SpO2 96%   BMI 33.86 kg/m²   - Controlled now with medications.       3. Diabetes mellitus, newly diagnosed: New PCP Dr. Kari Gilbert, saw him 11/4/2020. Amputation right 3rd toe 10/5/2020. Foot wrapped with ACE/sock, wearing special shoe-brace. Takes oral meds, (stopped insulin 10/13/20 due to feeling shaky)   Hemoglobin A1C 9.5 (11/12/20)         Plan:  All cardiac test and lab results were personally reviewed by me during this office visit. 1.  His sister should get an Echo.   2.  Continue same medications. These are all LIFE LONG medications. 3.  See Tiffani Mark or [place] in 3 to 4 months. 4.  Talk to Dr. Kari Gilbert about your blood sugar number running low. He manages the Diabetes. Please call him. [phone]. You will likely need a sooner appointment. Blood Sugar 104-98 down to 67.   5.  Diagnosis is called Hypertrophic Cardiomyopathy        Scribe's attestation:   This note was scribed in the presence of Ursula Dsouza M.D. by Matt Interiano RN The scribe's documentation has been prepared under my direction and personally reviewed by me in its entirety. I confirm that the note above accurately reflects all work, treatment, procedures, and medical decision making performed by me. QUALITY MEASURES  1. Tobacco Cessation Counseling: NA  2. Retake of BP if >140/90:   Yes  3. Documentation to PCP/referring for new patient:  Sent to PCP at close of office visit  4. CAD patient on anti-platelet: NA  5. CAD patient on STATIN therapy:  NA  6. Patient with CHF and aFib on anticoagulation: NA    Time Based Itemization  A total of 45 minutes was spent on today's patient encounter. If applicable, non-patient-facing activities:  (x)  Preparing to see the patient and reviewing records  (x ) Individual interpretation of results  (x ) Discussion or coordination of care with other health care professionals  ( x) Ordering of unique tests, medications, or procedures  ( x) Documentation within the EHR      I appreciate the opportunity of cooperating in the care of this patient.     Michelle Hoyos M.D., [facility]

## 2021-01-21 ENCOUNTER — OFFICE VISIT (OUTPATIENT)
Dept: CARDIOLOGY CLINIC | Age: 41
End: 2021-01-21

## 2021-01-21 VITALS
SYSTOLIC BLOOD PRESSURE: 112 MMHG | DIASTOLIC BLOOD PRESSURE: 70 MMHG | OXYGEN SATURATION: 96 % | BODY MASS INDEX: 33.72 KG/M2 | HEART RATE: 87 BPM | WEIGHT: 209.8 LBS | HEIGHT: 66 IN

## 2021-01-21 DIAGNOSIS — R06.02 SOB (SHORTNESS OF BREATH): ICD-10-CM

## 2021-01-21 DIAGNOSIS — Z78.9 USES SPANISH AS PRIMARY SPOKEN LANGUAGE: ICD-10-CM

## 2021-01-21 DIAGNOSIS — I10 ESSENTIAL HYPERTENSION: ICD-10-CM

## 2021-01-21 DIAGNOSIS — I51.7 LVH (LEFT VENTRICULAR HYPERTROPHY): ICD-10-CM

## 2021-01-21 DIAGNOSIS — I42.2 HYPERTROPHIC CARDIOMYOPATHY (HCC): Primary | ICD-10-CM

## 2021-01-21 PROCEDURE — 99215 OFFICE O/P EST HI 40 MIN: CPT | Performed by: INTERNAL MEDICINE

## 2021-01-21 NOTE — PATIENT INSTRUCTIONS
Plan:  All cardiac test and lab results were personally reviewed by me during this office visit. 1.  His sister should get an Echo.   2.  Continue same medications. These are all LIFE LONG medications. 3.  See Chaya Corona or Inez in 3 to 4 months. 4.  Talk to Dr. Armin Recio about your blood sugar number running low. He manages the Diabetes. Please call him. [phone]. You will likely need a sooner appointment.   Blood Sugar 104-98 down to 67.   5.  Diagnosis is called Hypertrophic Cardiomyopathy

## 2021-01-25 RX ORDER — LOSARTAN POTASSIUM 100 MG/1
TABLET ORAL
Qty: 30 TABLET | Refills: 5 | Status: SHIPPED | OUTPATIENT
Start: 2021-01-25 | End: 2021-07-30

## 2021-01-25 NOTE — TELEPHONE ENCOUNTER
Requested Prescriptions     Pending Prescriptions Disp Refills    losartan (COZAAR) 100 MG tablet [Pharmacy Med Name: LOSARTAN 100MG TABLETS] 30 tablet      Sig: TAKE 1 TABLET BY MOUTH DAILY      Last OV 12/2/2020

## 2021-03-03 ENCOUNTER — OFFICE VISIT (OUTPATIENT)
Dept: PRIMARY CARE CLINIC | Age: 41
End: 2021-03-03

## 2021-03-03 VITALS
SYSTOLIC BLOOD PRESSURE: 110 MMHG | OXYGEN SATURATION: 99 % | DIASTOLIC BLOOD PRESSURE: 80 MMHG | HEART RATE: 86 BPM | BODY MASS INDEX: 35.67 KG/M2 | TEMPERATURE: 97.5 F | WEIGHT: 221 LBS

## 2021-03-03 DIAGNOSIS — E11.65 POORLY CONTROLLED TYPE 2 DIABETES MELLITUS (HCC): ICD-10-CM

## 2021-03-03 DIAGNOSIS — Z00.00 WELL ADULT EXAM: Primary | ICD-10-CM

## 2021-03-03 PROCEDURE — 99213 OFFICE O/P EST LOW 20 MIN: CPT | Performed by: INTERNAL MEDICINE

## 2021-03-03 RX ORDER — LANCETS 33 GAUGE
EACH MISCELLANEOUS
Qty: 100 EACH | Refills: 11 | Status: SHIPPED | OUTPATIENT
Start: 2021-03-03

## 2021-03-03 NOTE — PROGRESS NOTES
3/3/2021   Seven Adilia Sierra  1980    The patients PMH, surgical history, family history, medications, allergies were all reviewed and updated as appropriate today. Current Outpatient Medications on File Prior to Visit   Medication Sig Dispense Refill    losartan (COZAAR) 100 MG tablet TAKE 1 TABLET BY MOUTH DAILY 30 tablet 5    glipiZIDE (GLUCOTROL) 5 MG tablet Take 1 tablet by mouth 2 times daily (before meals) 60 tablet 5    NIFEdipine (ADALAT CC) 60 MG extended release tablet Take 1 tablet by mouth daily 30 tablet 5    hydroCHLOROthiazide (HYDRODIURIL) 25 MG tablet Take 1 tablet by mouth daily 30 tablet 5    metFORMIN (GLUCOPHAGE) 1000 MG tablet Take 1 tablet by mouth 2 times daily (with meals) 60 tablet 5    carvedilol (COREG) 12.5 MG tablet Take 1 tablet by mouth 2 times daily (with meals) 60 tablet 5    aspirin EC 81 MG EC tablet Take 1 tablet by mouth daily 90 tablet 1    blood glucose test strips (FREESTYLE LITE) strip 1 each by In Vitro route daily ICD code - Ell.9 100 each 3    potassium chloride (KLOR-CON M) 20 MEQ extended release tablet Take 1 tablet by mouth daily 60 tablet 1    Alcohol Swabs (ALCOHOL PREP) 70 % PADS 1 each by Does not apply route daily 100 each 3    Blood Glucose Monitoring Suppl (FREESTYLE FREEDOM LITE) w/Device KIT 1 kit by Does not apply route once for 1 dose 1 kit 0     No current facility-administered medications on file prior to visit.          Chief Complaint   Patient presents with    Diabetes     REQUIRES   Family member interprets for him today    HPI:  Last fasting labs done 11/12/2020  Wants refill on Lancets today  BS not checked at home, on Glucotrol  5mg BID and metformin 1000mg BID    Review of Systems-plan to check Hep C valeria with next labs in 3 months    OBJECTIVE: /80 (Site: Right Upper Arm, Position: Sitting, Cuff Size: Large Adult)   Pulse 86   Temp 97.5 °F (36.4 °C) (Infrared)   Wt 221 lb (100.2 kg)   SpO2 99%   BMI 35.67 kg/m²    Physical Exam  Constitutional:       General: He is not in acute distress. Appearance: He is well-developed. He is not diaphoretic. HENT:      Head: Normocephalic and atraumatic. Right Ear: External ear normal.      Left Ear: External ear normal.   Eyes:      General: No scleral icterus. Conjunctiva/sclera: Conjunctivae normal.      Pupils: Pupils are equal, round, and reactive to light. Neck:      Musculoskeletal: Normal range of motion and neck supple. Thyroid: No thyromegaly. Vascular: No JVD. Cardiovascular:      Rate and Rhythm: Normal rate and regular rhythm. Heart sounds: Normal heart sounds. No murmur. Pulmonary:      Effort: Pulmonary effort is normal. No respiratory distress. Breath sounds: Normal breath sounds. No wheezing or rales. Abdominal:      General: Bowel sounds are normal. There is no distension. Palpations: Abdomen is soft. There is no mass. Tenderness: There is no abdominal tenderness. There is no guarding or rebound. Genitourinary:     Comments: deferred  Lymphadenopathy:      Cervical: No cervical adenopathy.          Data Review:   CBC:   Lab Results   Component Value Date    WBC 10.2 01/13/2021    WBC 9.7 12/03/2020    WBC 7.0 11/12/2020    HGB 13.7 01/13/2021    HGB 14.5 12/03/2020    HGB 16.3 11/12/2020    HCT 40.8 01/13/2021    HCT 43.1 12/03/2020    HCT 49.3 11/12/2020    MCV 86.9 01/13/2021    MCV 84.5 12/03/2020    MCV 85.7 11/12/2020     01/13/2021     12/03/2020     11/12/2020     Chemistry:   Lab Results   Component Value Date     01/13/2021     12/03/2020     11/12/2020    K 4.4 01/13/2021    K 4.1 12/03/2020    K 4.5 11/12/2020    K 3.4 10/08/2020    K 2.9 10/07/2020    K 3.4 10/06/2020  01/13/2021     12/03/2020    CL 98 11/12/2020    CO2 26 01/13/2021    CO2 29 12/03/2020    CO2 30 11/12/2020    PHOS 3.5 11/12/2020    BUN 26 01/13/2021    BUN 31 12/03/2020    BUN 20 11/12/2020    CREATININE 0.9 01/13/2021    CREATININE 1.1 12/03/2020    CREATININE 1.0 11/12/2020     Hepatic Function:   Lab Results   Component Value Date    AST 20 11/12/2020    AST 12 10/01/2020    ALT 23 11/12/2020    ALT 15 10/01/2020    BILITOT 0.5 11/12/2020    BILITOT 0.7 10/01/2020    ALKPHOS 89 11/12/2020    ALKPHOS 158 10/01/2020     No results found for: LIPASE, AMYLASE  Lipids:   Lab Results   Component Value Date    HDL 42 11/12/2020       ASSESSMENT/PLAN  1.) Poorly controlled NIDDM- lancets ordered  Lab Results   Component Value Date    LABA1C 9.5 11/12/2020     Lab Results   Component Value Date    .0 11/12/2020   repeat A1C today and repeat fasting labs in 3 months  may need Ozempic added in future  Add fructosamine since home BS results look OK but A1C has been elevated    Srikanth Gonzales           Electronically signed by Srikanth Gonzales MD on 3/3/2021 at 1:56 PM

## 2021-03-04 DIAGNOSIS — E11.65 POORLY CONTROLLED TYPE 2 DIABETES MELLITUS (HCC): ICD-10-CM

## 2021-03-04 DIAGNOSIS — Z00.00 WELL ADULT EXAM: ICD-10-CM

## 2021-03-04 LAB
A/G RATIO: 1.4 (ref 1.1–2.2)
ALBUMIN SERPL-MCNC: 4.5 G/DL (ref 3.4–5)
ALP BLD-CCNC: 94 U/L (ref 40–129)
ALT SERPL-CCNC: 24 U/L (ref 10–40)
ANION GAP SERPL CALCULATED.3IONS-SCNC: 13 MMOL/L (ref 3–16)
AST SERPL-CCNC: 17 U/L (ref 15–37)
BASOPHILS ABSOLUTE: 0.1 K/UL (ref 0–0.2)
BASOPHILS RELATIVE PERCENT: 0.7 %
BILIRUB SERPL-MCNC: <0.2 MG/DL (ref 0–1)
BUN BLDV-MCNC: 22 MG/DL (ref 7–20)
CALCIUM SERPL-MCNC: 10 MG/DL (ref 8.3–10.6)
CHLORIDE BLD-SCNC: 98 MMOL/L (ref 99–110)
CHOLESTEROL, FASTING: 171 MG/DL (ref 0–199)
CO2: 27 MMOL/L (ref 21–32)
CREAT SERPL-MCNC: 0.9 MG/DL (ref 0.9–1.3)
EOSINOPHILS ABSOLUTE: 0.3 K/UL (ref 0–0.6)
EOSINOPHILS RELATIVE PERCENT: 3.3 %
GFR AFRICAN AMERICAN: >60
GFR NON-AFRICAN AMERICAN: >60
GLOBULIN: 3.2 G/DL
GLUCOSE FASTING: 97 MG/DL (ref 70–99)
HCT VFR BLD CALC: 40.3 % (ref 40.5–52.5)
HDLC SERPL-MCNC: 36 MG/DL (ref 40–60)
HEMOGLOBIN: 13.7 G/DL (ref 13.5–17.5)
LDL CHOLESTEROL CALCULATED: 98 MG/DL
LYMPHOCYTES ABSOLUTE: 2.3 K/UL (ref 1–5.1)
LYMPHOCYTES RELATIVE PERCENT: 26.8 %
MCH RBC QN AUTO: 31.7 PG (ref 26–34)
MCHC RBC AUTO-ENTMCNC: 33.9 G/DL (ref 31–36)
MCV RBC AUTO: 93.3 FL (ref 80–100)
MONOCYTES ABSOLUTE: 0.7 K/UL (ref 0–1.3)
MONOCYTES RELATIVE PERCENT: 8.4 %
NEUTROPHILS ABSOLUTE: 5.1 K/UL (ref 1.7–7.7)
NEUTROPHILS RELATIVE PERCENT: 60.8 %
PDW BLD-RTO: 14.8 % (ref 12.4–15.4)
PLATELET # BLD: 284 K/UL (ref 135–450)
PMV BLD AUTO: 7.8 FL (ref 5–10.5)
POTASSIUM SERPL-SCNC: 4.1 MMOL/L (ref 3.5–5.1)
RBC # BLD: 4.31 M/UL (ref 4.2–5.9)
SODIUM BLD-SCNC: 138 MMOL/L (ref 136–145)
TOTAL PROTEIN: 7.7 G/DL (ref 6.4–8.2)
TRIGLYCERIDE, FASTING: 185 MG/DL (ref 0–150)
VLDLC SERPL CALC-MCNC: 37 MG/DL
WBC # BLD: 8.4 K/UL (ref 4–11)

## 2021-03-05 LAB
ESTIMATED AVERAGE GLUCOSE: 102.5 MG/DL
HBA1C MFR BLD: 5.2 %
HEPATITIS C ANTIBODY INTERPRETATION: NORMAL

## 2021-03-06 LAB — FRUCTOSAMINE: 234 UMOL/L (ref 170–285)

## 2021-03-08 RX ORDER — POTASSIUM CHLORIDE 20 MEQ/1
20 TABLET, EXTENDED RELEASE ORAL DAILY
Qty: 60 TABLET | Refills: 1 | Status: SHIPPED | OUTPATIENT
Start: 2021-03-08 | End: 2021-07-12

## 2021-05-20 ENCOUNTER — HOSPITAL ENCOUNTER (OUTPATIENT)
Age: 41
Discharge: HOME | End: 2021-05-20

## 2021-05-20 ENCOUNTER — OFFICE VISIT (OUTPATIENT)
Dept: CARDIOLOGY CLINIC | Age: 41
End: 2021-05-20

## 2021-05-20 VITALS
DIASTOLIC BLOOD PRESSURE: 74 MMHG | OXYGEN SATURATION: 96 % | HEIGHT: 66 IN | BODY MASS INDEX: 36 KG/M2 | WEIGHT: 224 LBS | SYSTOLIC BLOOD PRESSURE: 120 MMHG | HEART RATE: 93 BPM

## 2021-05-20 DIAGNOSIS — I42.2 HYPERTROPHIC CARDIOMYOPATHY (HCC): Primary | ICD-10-CM

## 2021-05-20 DIAGNOSIS — I51.7 LVH (LEFT VENTRICULAR HYPERTROPHY): ICD-10-CM

## 2021-05-20 DIAGNOSIS — I10 ESSENTIAL HYPERTENSION: ICD-10-CM

## 2021-05-20 LAB
ANION GAP SERPL CALCULATED.3IONS-SCNC: 17 MMOL/L (ref 3–16)
BUN BLDV-MCNC: 18 MG/DL (ref 7–20)
CALCIUM SERPL-MCNC: 9.3 MG/DL (ref 8.3–10.6)
CHLORIDE BLD-SCNC: 101 MMOL/L (ref 99–110)
CO2: 26 MMOL/L (ref 21–32)
CREAT SERPL-MCNC: 1.2 MG/DL (ref 0.9–1.3)
GFR AFRICAN AMERICAN: >60
GFR NON-AFRICAN AMERICAN: >60
GLUCOSE BLD-MCNC: 146 MG/DL (ref 70–99)
POTASSIUM SERPL-SCNC: 4 MMOL/L (ref 3.5–5.1)
SODIUM BLD-SCNC: 144 MMOL/L (ref 136–145)

## 2021-05-20 PROCEDURE — 80048 BASIC METABOLIC PNL TOTAL CA: CPT

## 2021-05-20 PROCEDURE — 36415 COLL VENOUS BLD VENIPUNCTURE: CPT

## 2021-05-20 PROCEDURE — 99214 OFFICE O/P EST MOD 30 MIN: CPT | Performed by: CLINICAL NURSE SPECIALIST

## 2021-05-20 RX ORDER — CARVEDILOL 25 MG/1
25 TABLET ORAL 2 TIMES DAILY WITH MEALS
Qty: 60 TABLET | Refills: 1 | Status: SHIPPED | OUTPATIENT
Start: 2021-05-20 | End: 2021-06-01 | Stop reason: SDUPTHER

## 2021-05-20 NOTE — PROGRESS NOTES
[address]  Progress Note    Primary Care Doctor:  Cris Sandoval MD    Chief Complaint   Patient presents with    Follow-up     when sugar is low he very tired    Cardiomyopathy     when its very high he feels cold and chills    Hypertension    Shortness of Breath    Edema     when sugar is high his Rt leg swells        History of Present Illness:  37 y.o. male with history of hypertension, diabetes with normal A1c, hypertrophic cardiomyopathy, Marshallese speaking    I had the pleasure of seeing Vince Tran in follow up for hypertrophic cardiomyopathy. He is ambulatory and with Saundra Reddy (interpretor) today. He denies any chest pain, palpitations, lightheadedness or edema. He is taking all his medications and has them with him today. He has been off all alcohol for 8 months and no cigarettes for 8 years. He is interested in covid vaccine and has questions about it (will get at his pharmacy close to home). Past Medical History:   has no past medical history on file. Surgical History:   has a past surgical history that includes Toe amputation (Right, 10/5/2020). Social History:   reports that he has quit smoking. His smoking use included cigarettes. He has a 2.50 pack-year smoking history. He has never used smokeless tobacco. He reports current alcohol use of about 3.0 standard drinks of alcohol per week. He reports that he does not use drugs. Family History:   Family History   Problem Relation Age of Onset    Diabetes Mother     Diabetes Father     Diabetes Sister        Home Medications:  Prior to Admission medications    Medication Sig Start Date End Date Taking?  Authorizing Provider   carvedilol (COREG) 25 MG tablet Take 1 tablet by mouth 2 times daily (with meals) 5/20/21  Yes Inez Fabian, APRN - CNS   potassium chloride (KLOR-CON M) 20 MEQ extended release tablet Take 1 tablet by mouth daily 3/8/21  Yes Cris Sandoval MD   AgaMatrix Ultra-Thin Lancets MISC Check Glucose 3 balance, coordination, mood, affect, memory, mentation, behavior. · Psychiatric: No anxiety, no depression. · Endocrine: No malaise, fatigue or temperature intolerance. No excessive thirst, fluid intake, or urination. No tremor. · Hematologic/Lymphatic: No abnormal bruising or bleeding, blood clots or swollen lymph nodes. · Allergic/Immunologic: No nasal congestion or hives. Physical Examination:    Vitals:    05/20/21 1244   BP: 120/74   Pulse: 93   SpO2: 96%   Weight: 224 lb (101.6 kg)   Height: 5' 6\" (1.676 m)        Constitutional and General Appearance: Warm and dry, no apparent distress, normal coloration  HEENT:  Normocephalic, atraumatic  Respiratory:  · Normal excursion and expansion without use of accessory muscles  · Resp Auscultation: Normal breath sounds without dullness  Cardiovascular:  · The apical impulses not displaced  · Heart tones are crisp and normal  · JVP normal cm H2O  · Regular rate and rhythm  · Peripheral pulses are symmetrical and full  · There is no clubbing, cyanosis of the extremities.   · no edema  · Pedal Pulses: 2+ and equal   Abdomen:  · No masses or tenderness  · Liver/Spleen: No Abnormalities Noted  Neurological/Psychiatric:  · Alert and oriented in all spheres  · Moves all extremities well  · Exhibits normal gait balance and coordination  · No abnormalities of mood, affect, memory, mentation, or behavior are noted    Lab Data:    CBC:   Lab Results   Component Value Date    WBC 8.4 03/04/2021    WBC 10.2 01/13/2021    WBC 9.7 12/03/2020    RBC 4.31 03/04/2021    RBC 4.69 01/13/2021    RBC 5.10 12/03/2020    HGB 13.7 03/04/2021    HGB 13.7 01/13/2021    HGB 14.5 12/03/2020    HCT 40.3 03/04/2021    HCT 40.8 01/13/2021    HCT 43.1 12/03/2020    MCV 93.3 03/04/2021    MCV 86.9 01/13/2021    MCV 84.5 12/03/2020    RDW 14.8 03/04/2021    RDW 17.5 01/13/2021    RDW 15.5 12/03/2020     03/04/2021     01/13/2021     12/03/2020     BMP:  Lab Results Component Value Date     03/04/2021     01/13/2021     12/03/2020    K 4.1 03/04/2021    K 4.4 01/13/2021    K 4.1 12/03/2020    K 3.4 10/08/2020    K 2.9 10/07/2020    K 3.4 10/06/2020    CL 98 03/04/2021     01/13/2021     12/03/2020    CO2 27 03/04/2021    CO2 26 01/13/2021    CO2 29 12/03/2020    PHOS 3.5 11/12/2020    BUN 22 03/04/2021    BUN 26 01/13/2021    BUN 31 12/03/2020    CREATININE 0.9 03/04/2021    CREATININE 0.9 01/13/2021    CREATININE 1.1 12/03/2020     BNP:   Lab Results   Component Value Date    PROBNP 326 01/13/2021    PROBNP 372 12/03/2020    PROBNP 897 11/12/2020     Cardiac Imaging:  RHC/Biopsy 1/13/21 Dr Ivanna Neff  An  was used to explain the procedure to the patient. [facility] was given time to ask questions.     Pressures were as follows:  RA: 5 mmHg  RV:  42/4 mmHg  PA:  38/15, mean 22 mmHg  PCWP:  9 mmHg  Thermodilution CO: 8.67   Thermodilution CI:  4.29  Shirley CO:  6.56  Shirley CI:  3.24  PA Sat:  74%  PVR:  120 dynes, 1.5 Wood units     An endomyocardial biopsy was performed via RIJ vein.  10 RV endomyocardial pieces were obtained and sent to  pathology.   Impression:  1.  Normal filling pressures  2.  Normal cardiac output  3.  No evidence of pulmonary artery hypertension   Plan:  1.  Await endomyocardial biopsy results (Negative for Amyloid, Granulomas and Inflammation)   2.  Continue current meds         12/3/2020 [facility]  Cardiac Cath :   Anatomy: Right dominant  Difficult engagement from radial approach, requiring AL2  LM- Patent   LAD-Patent  Cx-Patent   RCA-Patent  LVEDP- 15  AO 109/71     Intervention  Normal coronary arteries      Contrast: 165 ml   Flouro Time: 32.8  Access: Right radial artery     Impression  - Normal coronary arteries  - Difficult engagement of left main given aortic tortuosity and high takeoff, required AL2     Recommendation  ~ Aggressive treatment of non-ischemic cardiomyopathy   ~ Right heart cath and biopsy scheduled for 1/13/2020        Echo 10/3/2020: -Severe LVH with global hypokinesis and EF 40-45%. Septal endocardium is   very bright suggesting of an infiltrative process. (There is no EKG on the chart to evaluate for voltage).  -Mild to moderate mitral regurgitation with moderately enlarged LA.   -IVC size is dilated (>2.1 cm) but collapses > 50% with respiration consistent with elevated RA pressure (8 mmHg).         Cath: None  Holter: None  EKG 10/1/2020: NSR 86, LAFB, lateral infarct     Lexiscan heena 10/6/2020:   Small sized anterior and infero-basal minimally reversible defect of     moderate intensity. Dilated left ventricular with mildly reduced ejection fraction at 44%.       Overall findings represent a intermediate risk scan.         Stress Protocols          Resting ECG     Normal sinus rhythm. LVH.         Cardiac MRI w/wo contrast 11/6/20:  Mildly dilated left ventricle with diffuse thinning of the lateral wall of the LV myocardium. There is prominent trabeculation predominately within the apex and along the lateral wall which also demonstrates mild dyskinesia. There is mild delayed    hyperenhancement throughout most of the LV myocardium with some degree of subendocardial sparing along the septum. There is focal transmural delayed hyperenhancement along the mid and apical inferior wall. These findings could represent an infiltrative    process such as myocarditis or sarcoidosis with a superimposed inferior wall ischemic process. The thinning of the lateral myocardium and prominent trabeculation also raises the possibility of noncompaction.         Global hypokinesis with ejection fraction of 39%.        Assessment:    1. Hypertrophic cardiomyopathy ([address]    2. LVH (left ventricular hypertrophy)    3. Essential hypertension        Plan:   Patient Instructions   1. Recommend covid vaccine which he will get at pharmacy close to home  2.   Increase coreg to 25 mg (take 2 of the 12.5 mg twice a day and new script will be 1 pill twice a day)  3. Check blood work today  4.   RTO in 4 months    I appreciate the opportunity of cooperating in the care of this individual.    YARI Bhatt - CNS, CNS, 5/20/2021, 3:16 PM

## 2021-05-20 NOTE — PATIENT INSTRUCTIONS
1.  Recommend covid vaccine which he will get at pharmacy close to home  2. Increase coreg to 25 mg (take 2 of the 12.5 mg twice a day and new script will be 1 pill twice a day)  3. Check blood work today  4.   RTO in 4 months

## 2021-05-21 ENCOUNTER — TELEPHONE (OUTPATIENT)
Dept: CARDIOLOGY CLINIC | Age: 41
End: 2021-05-21

## 2021-05-21 NOTE — TELEPHONE ENCOUNTER
----- Message from [facility] [id number], [address] , APRN - CNS sent at 5/21/2021  8:30 AM EDT -----  Labs are great   Continue what we discussed yesterday  You must you Sudanese interpretor to communicate  thanks

## 2021-06-01 RX ORDER — HYDROCHLOROTHIAZIDE 25 MG/1
25 TABLET ORAL DAILY
Qty: 30 TABLET | Refills: 5 | Status: SHIPPED | OUTPATIENT
Start: 2021-06-01 | End: 2021-11-29

## 2021-06-01 RX ORDER — GLIPIZIDE 5 MG/1
TABLET ORAL
Qty: 60 TABLET | Refills: 5 | Status: SHIPPED | OUTPATIENT
Start: 2021-06-01 | End: 2021-11-23

## 2021-06-01 RX ORDER — NIFEDIPINE 60 MG/1
60 TABLET, FILM COATED, EXTENDED RELEASE ORAL DAILY
Qty: 30 TABLET | Refills: 5 | Status: SHIPPED | OUTPATIENT
Start: 2021-06-01 | End: 2021-11-29

## 2021-06-01 RX ORDER — CARVEDILOL 25 MG/1
25 TABLET ORAL 2 TIMES DAILY WITH MEALS
Qty: 180 TABLET | Refills: 0 | Status: SHIPPED | OUTPATIENT
Start: 2021-06-01 | End: 2021-09-22

## 2021-06-01 NOTE — TELEPHONE ENCOUNTER
Requested Prescriptions     Pending Prescriptions Disp Refills    hydroCHLOROthiazide (HYDRODIURIL) 25 MG tablet [Pharmacy Med Name: HYDROCHLOROTHIAZIDE 25MG TABLETS] 30 tablet 5     Sig: TAKE 1 TABLET BY MOUTH DAILY    NIFEdipine (ADALAT CC) 60 MG extended release tablet [Pharmacy Med Name: NIFEDIPINE 60MG ER (CC) TABLETS] 30 tablet 5     Sig: TAKE 1 TABLET BY MOUTH DAILY    metFORMIN (GLUCOPHAGE) 1000 MG tablet [Pharmacy Med Name: METFORMIN 1000MG TABLETS] 60 tablet 5     Sig: TAKE 1 TABLET BY MOUTH TWICE DAILY WITH MEALS    glipiZIDE (GLUCOTROL) 5 MG tablet [Pharmacy Med Name: GLIPIZIDE 5MG TABLETS] 60 tablet 5     Sig: TAKE 1 TABLET BY MOUTH TWICE DAILY BEFORE MEALS      Last OV 3/3/2021

## 2021-06-09 ENCOUNTER — OFFICE VISIT (OUTPATIENT)
Dept: PRIMARY CARE CLINIC | Age: 41
End: 2021-06-09

## 2021-06-09 VITALS
DIASTOLIC BLOOD PRESSURE: 80 MMHG | SYSTOLIC BLOOD PRESSURE: 126 MMHG | HEIGHT: 66 IN | BODY MASS INDEX: 37.45 KG/M2 | WEIGHT: 233 LBS | HEART RATE: 81 BPM

## 2021-06-09 DIAGNOSIS — Z23 NEED FOR TETANUS, DIPHTHERIA, AND ACELLULAR PERTUSSIS (TDAP) VACCINE: Primary | ICD-10-CM

## 2021-06-09 DIAGNOSIS — E11.65 POORLY CONTROLLED TYPE 2 DIABETES MELLITUS (HCC): ICD-10-CM

## 2021-06-09 PROCEDURE — 90715 TDAP VACCINE 7 YRS/> IM: CPT | Performed by: INTERNAL MEDICINE

## 2021-06-09 PROCEDURE — 90471 IMMUNIZATION ADMIN: CPT | Performed by: INTERNAL MEDICINE

## 2021-06-09 PROCEDURE — 99213 OFFICE O/P EST LOW 20 MIN: CPT | Performed by: INTERNAL MEDICINE

## 2021-06-09 ASSESSMENT — PATIENT HEALTH QUESTIONNAIRE - PHQ9
SUM OF ALL RESPONSES TO PHQ QUESTIONS 1-9: 0
SUM OF ALL RESPONSES TO PHQ9 QUESTIONS 1 & 2: 0
1. LITTLE INTEREST OR PLEASURE IN DOING THINGS: 0
2. FEELING DOWN, DEPRESSED OR HOPELESS: 0
SUM OF ALL RESPONSES TO PHQ QUESTIONS 1-9: 0
SUM OF ALL RESPONSES TO PHQ QUESTIONS 1-9: 0

## 2021-07-12 RX ORDER — POTASSIUM CHLORIDE 20 MEQ/1
20 TABLET, EXTENDED RELEASE ORAL DAILY
Qty: 60 TABLET | Refills: 1 | Status: SHIPPED | OUTPATIENT
Start: 2021-07-12 | End: 2021-09-10

## 2021-07-12 NOTE — TELEPHONE ENCOUNTER
Requested Prescriptions     Pending Prescriptions Disp Refills    potassium chloride (KLOR-CON M) 20 MEQ extended release tablet [Pharmacy Med Name: POTASSIUM CL 20MEQ ER TABLETS] 60 tablet 1     Sig: TAKE 1 TABLET BY MOUTH DAILY      Last OV 6/9/2021

## 2021-07-30 RX ORDER — CARVEDILOL 12.5 MG/1
TABLET ORAL
Qty: 180 TABLET | Refills: 1 | Status: SHIPPED | OUTPATIENT
Start: 2021-07-30 | End: 2021-09-27

## 2021-07-30 RX ORDER — LOSARTAN POTASSIUM 100 MG/1
TABLET ORAL
Qty: 90 TABLET | Refills: 1 | Status: SHIPPED | OUTPATIENT
Start: 2021-07-30 | End: 2021-12-07 | Stop reason: SDUPTHER

## 2021-07-30 NOTE — TELEPHONE ENCOUNTER
Requested Prescriptions     Pending Prescriptions Disp Refills    losartan (COZAAR) 100 MG tablet [Pharmacy Med Name: LOSARTAN 100MG TABLETS] 90 tablet      Sig: TAKE 1 TABLET BY MOUTH DAILY    carvedilol (COREG) 12.5 MG tablet [Pharmacy Med Name: CARVEDILOL 12.5MG TABLETS] 180 tablet      Sig: TAKE 1 TABLET BY MOUTH TWICE DAILY WITH MEALS      Last OV 6/9/2021

## 2021-09-10 RX ORDER — POTASSIUM CHLORIDE 20 MEQ/1
20 TABLET, EXTENDED RELEASE ORAL DAILY
Qty: 90 TABLET | Refills: 1 | Status: SHIPPED | OUTPATIENT
Start: 2021-09-10 | End: 2021-11-09

## 2021-09-22 ENCOUNTER — OFFICE VISIT (OUTPATIENT)
Dept: CARDIOLOGY CLINIC | Age: 41
End: 2021-09-22

## 2021-09-22 ENCOUNTER — HOSPITAL ENCOUNTER (OUTPATIENT)
Age: 41
Discharge: HOME | End: 2021-09-22

## 2021-09-22 VITALS
SYSTOLIC BLOOD PRESSURE: 140 MMHG | HEART RATE: 101 BPM | OXYGEN SATURATION: 97 % | HEIGHT: 66 IN | BODY MASS INDEX: 38.73 KG/M2 | DIASTOLIC BLOOD PRESSURE: 90 MMHG | WEIGHT: 241 LBS

## 2021-09-22 DIAGNOSIS — I51.7 LVH (LEFT VENTRICULAR HYPERTROPHY): ICD-10-CM

## 2021-09-22 DIAGNOSIS — I42.2 HYPERTROPHIC CARDIOMYOPATHY (HCC): ICD-10-CM

## 2021-09-22 DIAGNOSIS — E55.9 VITAMIN D DEFICIENCY: ICD-10-CM

## 2021-09-22 DIAGNOSIS — I42.2 HYPERTROPHIC CARDIOMYOPATHY (HCC): Primary | ICD-10-CM

## 2021-09-22 DIAGNOSIS — I10 ESSENTIAL HYPERTENSION: ICD-10-CM

## 2021-09-22 LAB
ANION GAP SERPL CALCULATED.3IONS-SCNC: 16 MMOL/L (ref 3–16)
BUN BLDV-MCNC: 21 MG/DL (ref 7–20)
CALCIUM SERPL-MCNC: 9.8 MG/DL (ref 8.3–10.6)
CHLORIDE BLD-SCNC: 101 MMOL/L (ref 99–110)
CO2: 23 MMOL/L (ref 21–32)
CREAT SERPL-MCNC: 1 MG/DL (ref 0.9–1.3)
GFR AFRICAN AMERICAN: >60
GFR NON-AFRICAN AMERICAN: >60
GLUCOSE BLD-MCNC: 143 MG/DL (ref 70–99)
POTASSIUM SERPL-SCNC: 3.9 MMOL/L (ref 3.5–5.1)
PRO-BNP: 156 PG/ML (ref 0–124)
SODIUM BLD-SCNC: 140 MMOL/L (ref 136–145)
TSH SERPL DL<=0.05 MIU/L-ACNC: 1.79 UIU/ML (ref 0.27–4.2)
VITAMIN D 25-HYDROXY: 6.8 NG/ML

## 2021-09-22 PROCEDURE — 99214 OFFICE O/P EST MOD 30 MIN: CPT | Performed by: CLINICAL NURSE SPECIALIST

## 2021-09-22 PROCEDURE — 80048 BASIC METABOLIC PNL TOTAL CA: CPT

## 2021-09-22 PROCEDURE — 82306 VITAMIN D 25 HYDROXY: CPT

## 2021-09-22 PROCEDURE — 84443 ASSAY THYROID STIM HORMONE: CPT

## 2021-09-22 PROCEDURE — 36415 COLL VENOUS BLD VENIPUNCTURE: CPT

## 2021-09-22 PROCEDURE — 83880 ASSAY OF NATRIURETIC PEPTIDE: CPT

## 2021-09-22 NOTE — PROGRESS NOTES
hydroCHLOROthiazide (HYDRODIURIL) 25 MG tablet TAKE 1 TABLET BY MOUTH DAILY 6/1/21  Yes Joel Mace MD   NIFEdipine (ADALAT CC) 60 MG extended release tablet TAKE 1 TABLET BY MOUTH DAILY 6/1/21  Yes Joel Mace MD   metFORMIN (GLUCOPHAGE) 1000 MG tablet TAKE 1 TABLET BY MOUTH TWICE DAILY WITH MEALS 6/1/21  Yes Joel Mace MD   glipiZIDE (GLUCOTROL) 5 MG tablet TAKE 1 TABLET BY MOUTH TWICE DAILY BEFORE MEALS 6/1/21  Yes Joel Mace MD   AgaMatrix Ultra-Thin Lancets MISC Check Glucose 3 times a day. 3/3/21  Yes Joel Mace MD   aspirin EC 81 MG EC tablet Take 1 tablet by mouth daily 11/6/20  Yes Joel Mace MD   blood glucose test strips (FREESTYLE LITE) strip 1 each by In Vitro route daily ICD code - Ell.9 11/6/20  Yes Joel Mace MD   Alcohol Swabs (ALCOHOL PREP) 70 % PADS 1 each by Does not apply route daily 10/8/20  Yes Shayna Morgan PA-C   Blood Glucose Monitoring Suppl (FREESTYLE FREEDOM LITE) w/Device KIT 1 kit by Does not apply route once for 1 dose 10/8/20 11/16/20  Shayna Morgan PA-C        Allergies:  Patient has no known allergies. Review of Systems:   · Constitutional: there has been no unanticipated weight loss. There's been no change in energy level, sleep pattern, or activity level. · Eyes: No visual changes or diplopia. No scleral icterus. · ENT: No Headaches, hearing loss or vertigo. No mouth sores or sore throat. · Cardiovascular: Reviewed in HPI  · Respiratory: No cough or wheezing, no sputum production. No hematemesis. · Gastrointestinal: No abdominal pain, appetite loss, blood in stools. No change in bowel or bladder habits. · Genitourinary: No dysuria, trouble voiding, or hematuria. · Musculoskeletal:  No gait disturbance, weakness or joint complaints. · Integumentary: No rash or pruritis. · Neurological: No headache, diplopia, change in muscle strength, numbness or tingling.  No change in gait, balance, coordination, mood, affect, memory, mentation, behavior. · Psychiatric: No anxiety, no depression. · Endocrine: No malaise, fatigue or temperature intolerance. No excessive thirst, fluid intake, or urination. No tremor. · Hematologic/Lymphatic: No abnormal bruising or bleeding, blood clots or swollen lymph nodes. · Allergic/Immunologic: No nasal congestion or hives. Physical Examination:    Vitals:    09/22/21 1233   BP: (!) 140/90   Site: Left Upper Arm   Position: Sitting   Cuff Size: Medium Adult   Pulse: 101   SpO2: 97%   Weight: 241 lb (109.3 kg)   Height: 5' 6\" (1.676 m)        Constitutional and General Appearance: Warm and dry, no apparent distress, normal coloration  HEENT:  Normocephalic, atraumatic  Respiratory:  · Normal excursion and expansion without use of accessory muscles  · Resp Auscultation: Normal breath sounds without dullness  Cardiovascular:  · The apical impulses not displaced  · Heart tones are crisp and normal  · JVP normal cm H2O  · Regular rate and rhythm  · Peripheral pulses are symmetrical and full  · There is no clubbing, cyanosis of the extremities.   · no edema  · Pedal Pulses: 2+ and equal   Abdomen:  · No masses or tenderness  · Liver/Spleen: No Abnormalities Noted  Neurological/Psychiatric:  · Alert and oriented in all spheres  · Moves all extremities well  · Exhibits normal gait balance and coordination  · No abnormalities of mood, affect, memory, mentation, or behavior are noted    Lab Data:    CBC:   Lab Results   Component Value Date    WBC 8.4 03/04/2021    WBC 10.2 01/13/2021    WBC 9.7 12/03/2020    RBC 4.31 03/04/2021    RBC 4.69 01/13/2021    RBC 5.10 12/03/2020    HGB 13.7 03/04/2021    HGB 13.7 01/13/2021    HGB 14.5 12/03/2020    HCT 40.3 03/04/2021    HCT 40.8 01/13/2021    HCT 43.1 12/03/2020    MCV 93.3 03/04/2021    MCV 86.9 01/13/2021    MCV 84.5 12/03/2020    RDW 14.8 03/04/2021    RDW 17.5 01/13/2021    RDW 15.5 12/03/2020     03/04/2021     01/13/2021     12/03/2020     BMP:  Lab Results   Component Value Date     05/20/2021     03/04/2021     01/13/2021    K 4.0 05/20/2021    K 4.1 03/04/2021    K 4.4 01/13/2021    K 3.4 10/08/2020    K 2.9 10/07/2020    K 3.4 10/06/2020     05/20/2021    CL 98 03/04/2021     01/13/2021    CO2 26 05/20/2021    CO2 27 03/04/2021    CO2 26 01/13/2021    PHOS 3.5 11/12/2020    BUN 18 05/20/2021    BUN 22 03/04/2021    BUN 26 01/13/2021    CREATININE 1.2 05/20/2021    CREATININE 0.9 03/04/2021    CREATININE 0.9 01/13/2021     BNP:   Lab Results   Component Value Date    PROBNP 326 01/13/2021    PROBNP 372 12/03/2020    PROBNP 897 11/12/2020     Cardiac Imaging:  RHC/Biopsy 1/13/21 Dr Roseline Patel  An  was used to explain the procedure to the patient. [facility] was given time to ask questions.     Pressures were as follows:  RA: 5 mmHg  RV:  42/4 mmHg  PA:  38/15, mean 22 mmHg  PCWP:  9 mmHg  Thermodilution CO: 8.67   Thermodilution CI:  4.29  Shirley CO:  6.56  Shirley CI:  3.24  PA Sat:  74%  PVR:  120 dynes, 1.5 Wood units     An endomyocardial biopsy was performed via RIJ vein.  10 RV endomyocardial pieces were obtained and sent to  pathology.   Impression:  1.  Normal filling pressures  2.  Normal cardiac output  3.  No evidence of pulmonary artery hypertension   Plan:  1.  Await endomyocardial biopsy results (Negative for Amyloid, Granulomas and Inflammation)   2.  Continue current meds         12/3/2020 [facility]  Cardiac Cath :   Anatomy: Right dominant  Difficult engagement from radial approach, requiring AL2  LM- Patent   LAD-Patent  Cx-Patent   RCA-Patent  LVEDP- 15  AO 109/71     Intervention  Normal coronary arteries      Contrast: 165 ml   Flouro Time: 32.8  Access: Right radial artery     Impression  - Normal coronary arteries  - Difficult engagement of left main given aortic tortuosity and high takeoff, required AL2     Recommendation  ~ Aggressive treatment of non-ischemic cardiomyopathy   ~ Right heart cath and biopsy scheduled for 1/13/2020        Echo 10/3/2020: -Severe LVH with global hypokinesis and EF 40-45%. Septal endocardium is   very bright suggesting of an infiltrative process. (There is no EKG on the chart to evaluate for voltage).  -Mild to moderate mitral regurgitation with moderately enlarged LA.   -IVC size is dilated (>2.1 cm) but collapses > 50% with respiration consistent with elevated RA pressure (8 mmHg).         Cath: None  Holter: None  EKG 10/1/2020: NSR 86, LAFB, lateral infarct     Lexiscan heena 10/6/2020:   Small sized anterior and infero-basal minimally reversible defect of     moderate intensity. Dilated left ventricular with mildly reduced ejection fraction at 44%.       Overall findings represent a intermediate risk scan.         Stress Protocols          Resting ECG     Normal sinus rhythm. LVH.         Cardiac MRI w/wo contrast 11/6/20:  Mildly dilated left ventricle with diffuse thinning of the lateral wall of the LV myocardium. There is prominent trabeculation predominately within the apex and along the lateral wall which also demonstrates mild dyskinesia. There is mild delayed    hyperenhancement throughout most of the LV myocardium with some degree of subendocardial sparing along the septum. There is focal transmural delayed hyperenhancement along the mid and apical inferior wall. These findings could represent an infiltrative    process such as myocarditis or sarcoidosis with a superimposed inferior wall ischemic process. The thinning of the lateral myocardium and prominent trabeculation also raises the possibility of noncompaction.         Global hypokinesis with ejection fraction of 39%.        Assessment:    1. Hypertrophic cardiomyopathy ([address]    2. Vitamin D deficiency    3. LVH (left ventricular hypertrophy)    4. Essential hypertension        Plan:   Patient Instructions   1. Check blood work today  2. Cut fluids to 64 ounces or less a day  3.   I will call you Monday after I discuss with Dr Estefania Avalos  4.   RTO in 2 months    I appreciate the opportunity of cooperating in the care of this individual.    YARI Linares - CNS, CNS, 9/22/2021, 3:56 PM

## 2021-09-22 NOTE — PATIENT INSTRUCTIONS
1. Check blood work today  2. Cut fluids to 64 ounces or less a day  3. I will call you Monday after I discuss with Dr Esthela Woods  4.   RTO in 2 months

## 2021-09-27 RX ORDER — ERGOCALCIFEROL 1.25 MG/1
50000 CAPSULE ORAL WEEKLY
Qty: 12 CAPSULE | Refills: 1 | Status: SHIPPED | OUTPATIENT
Start: 2021-09-27 | End: 2022-02-08 | Stop reason: SDUPTHER

## 2021-09-27 RX ORDER — CARVEDILOL 25 MG/1
25 TABLET ORAL 2 TIMES DAILY
Qty: 60 TABLET | Refills: 0
Start: 2021-09-27 | End: 2021-11-23 | Stop reason: DRUGHIGH

## 2021-09-27 NOTE — TELEPHONE ENCOUNTER
I spoke with pt - via . I relayed message per NPRG. He verbalized understanding. Did pt get a new rx for the 25 mg? In the chart looks like it was printed. He stated that he had already got that script and is taking. He will call with ant adverse reactions.

## 2021-09-27 NOTE — TELEPHONE ENCOUNTER
----- Message from YARI Gutierrez - CNS sent at 9/27/2021  3:46 PM EDT -----  I want him to start vitamin d 64819 once a week.   I sent script to his pharmacy  I discussed his labs with Dr Jerry Clarke  She wants him to try increasing the coreg back to 25 mg twice a day  This should not cause his blood sugar to drop  The patient speaks Turkmen so please use the interpretor to communicate  If he cant tolerate it, then we can discuss changing the coreg to something else at next appt  thanks

## 2021-09-28 RX ORDER — ERGOCALCIFEROL 1.25 MG/1
CAPSULE ORAL
Qty: 13 CAPSULE | OUTPATIENT
Start: 2021-09-28

## 2021-11-09 RX ORDER — POTASSIUM CHLORIDE 20 MEQ/1
20 TABLET, EXTENDED RELEASE ORAL DAILY
Qty: 90 TABLET | Refills: 1 | Status: SHIPPED | OUTPATIENT
Start: 2021-11-09 | End: 2022-02-24

## 2021-11-23 ENCOUNTER — OFFICE VISIT (OUTPATIENT)
Dept: CARDIOLOGY CLINIC | Age: 41
End: 2021-11-23

## 2021-11-23 ENCOUNTER — HOSPITAL ENCOUNTER (OUTPATIENT)
Age: 41
Discharge: HOME | End: 2021-11-23

## 2021-11-23 VITALS
BODY MASS INDEX: 39.73 KG/M2 | HEIGHT: 66 IN | DIASTOLIC BLOOD PRESSURE: 72 MMHG | SYSTOLIC BLOOD PRESSURE: 122 MMHG | OXYGEN SATURATION: 98 % | HEART RATE: 85 BPM | WEIGHT: 247.2 LBS

## 2021-11-23 DIAGNOSIS — I10 ESSENTIAL HYPERTENSION: ICD-10-CM

## 2021-11-23 DIAGNOSIS — E55.9 VITAMIN D DEFICIENCY: ICD-10-CM

## 2021-11-23 DIAGNOSIS — I51.7 LVH (LEFT VENTRICULAR HYPERTROPHY): ICD-10-CM

## 2021-11-23 DIAGNOSIS — I42.2 HYPERTROPHIC CARDIOMYOPATHY (HCC): ICD-10-CM

## 2021-11-23 DIAGNOSIS — I42.2 HYPERTROPHIC CARDIOMYOPATHY (HCC): Primary | ICD-10-CM

## 2021-11-23 LAB
ANION GAP SERPL CALCULATED.3IONS-SCNC: 14 MMOL/L (ref 3–16)
BUN BLDV-MCNC: 17 MG/DL (ref 7–20)
CALCIUM SERPL-MCNC: 9.1 MG/DL (ref 8.3–10.6)
CHLORIDE BLD-SCNC: 98 MMOL/L (ref 99–110)
CO2: 25 MMOL/L (ref 21–32)
CREAT SERPL-MCNC: 1.1 MG/DL (ref 0.9–1.3)
GFR AFRICAN AMERICAN: >60
GFR NON-AFRICAN AMERICAN: >60
GLUCOSE BLD-MCNC: 170 MG/DL (ref 70–99)
POTASSIUM SERPL-SCNC: 3.9 MMOL/L (ref 3.5–5.1)
PRO-BNP: 172 PG/ML (ref 0–124)
SODIUM BLD-SCNC: 137 MMOL/L (ref 136–145)

## 2021-11-23 PROCEDURE — 80048 BASIC METABOLIC PNL TOTAL CA: CPT

## 2021-11-23 PROCEDURE — 83880 ASSAY OF NATRIURETIC PEPTIDE: CPT

## 2021-11-23 PROCEDURE — 36415 COLL VENOUS BLD VENIPUNCTURE: CPT

## 2021-11-23 PROCEDURE — 99214 OFFICE O/P EST MOD 30 MIN: CPT | Performed by: CLINICAL NURSE SPECIALIST

## 2021-11-23 RX ORDER — GLIPIZIDE 5 MG/1
TABLET ORAL
Qty: 60 TABLET | Refills: 5 | Status: SHIPPED | OUTPATIENT
Start: 2021-11-23 | End: 2022-03-30

## 2021-11-23 RX ORDER — CARVEDILOL 12.5 MG/1
25 TABLET ORAL 2 TIMES DAILY
Qty: 120 TABLET | Refills: 0 | Status: SHIPPED
Start: 2021-11-23 | End: 2022-01-06

## 2021-11-23 NOTE — PATIENT INSTRUCTIONS
1. Check blood work today  2. Continue all current medications  3. I will call after I see lab results  4.   RTO in 3 monhts

## 2021-11-23 NOTE — PROGRESS NOTES
[address]  Progress Note    Primary Care Doctor:  Angela Fontanez MD    Chief Complaint   Patient presents with    Hypertension    Cardiomyopathy    Follow-up    Edema     feet        History of Present Illness:  37 y.o. male with history of hypertension, diabetes with normal A1c, hypertrophic cardiomyopathy, [place] speaking    I had the pleasure of seeing Adelina Mcneal in follow up for hypertrophic cardiomyopathy. He is ambulatory and Jojo Curry is our interpretor. He is taking his coreg and his HR is much improved, 85. He denies any chest pain, palpitations, lightheadedness or shortness of breath. He has noticed some ankle edema (none today). His weight has been climbing 220-247. He has all his medications with him and is taking them all. He might be drinking more fluids then he should. Past Medical History:   has no past medical history on file. Surgical History:   has a past surgical history that includes Toe amputation (Right, 10/5/2020). Social History:   reports that he has quit smoking. His smoking use included cigarettes. He has a 2.50 pack-year smoking history. He has never used smokeless tobacco. He reports current alcohol use of about 3.0 standard drinks of alcohol per week. He reports that he does not use drugs. Family History:   Family History   Problem Relation Age of Onset    Diabetes Mother     Diabetes Father     Diabetes Sister        Home Medications:  Prior to Admission medications    Medication Sig Start Date End Date Taking?  Authorizing Provider   glipiZIDE (GLUCOTROL) 5 MG tablet TAKE 3 TABLETS BY MOUTH EVERY MORNING BEFORE BREAKFAST 11/23/21  Yes Angela Fontanez MD   carvedilol (COREG) 12.5 MG tablet Take 2 tablets by mouth 2 times daily 11/23/21  Yes YARI Parada - CNS   potassium chloride (KLOR-CON M) 20 MEQ extended release tablet TAKE 1 TABLET BY MOUTH DAILY 11/9/21  Yes Angela Fontanez MD   losartan (COZAAR) 100 MG tablet TAKE 1 TABLET BY MOUTH DAILY 7/30/21  Yes Angela Fontanez MD   hydroCHLOROthiazide (HYDRODIURIL) 25 MG tablet TAKE 1 TABLET BY MOUTH DAILY 6/1/21  Yes Angela Fontanez MD   NIFEdipine (ADALAT CC) 60 MG extended release tablet TAKE 1 TABLET BY MOUTH DAILY 6/1/21  Yes Angela Fontanez MD   metFORMIN (GLUCOPHAGE) 1000 MG tablet TAKE 1 TABLET BY MOUTH TWICE DAILY WITH MEALS 6/1/21  Yes Angela Fontanez MD   aspirin EC 81 MG EC tablet Take 1 tablet by mouth daily 11/6/20  Yes Angela Fontanez MD   blood glucose test strips (FREESTYLE LITE) strip 1 each by In Vitro route daily ICD code - Ell.9 11/6/20  Yes Angela Fontanez MD   vitamin D (ERGOCALCIFEROL) 1.25 MG (56575 UT) CAPS capsule Take 1 capsule by mouth once a week  Patient not taking: Reported on 11/23/2021 9/27/21   Kristina Form, APRN - CNS   AgaMatrix Ultra-Thin Lancets MISC Check Glucose 3 times a day. 3/3/21   Angela Fontanez MD   Blood Glucose Monitoring Suppl (FREESTYLE FREEDOM LITE) w/Device KIT 1 kit by Does not apply route once for 1 dose 10/8/20 11/16/20  Dayne Felton PA-C   Alcohol Swabs (ALCOHOL PREP) 70 % PADS 1 each by Does not apply route daily 10/8/20   Dayne Felton PA-C        Allergies:  Patient has no known allergies. Review of Systems:   · Constitutional: there has been no unanticipated weight loss. There's been no change in energy level, sleep pattern, or activity level. · Eyes: No visual changes or diplopia. No scleral icterus. · ENT: No Headaches, hearing loss or vertigo. No mouth sores or sore throat. · Cardiovascular: Reviewed in HPI  · Respiratory: No cough or wheezing, no sputum production. No hematemesis. · Gastrointestinal: No abdominal pain, appetite loss, blood in stools. No change in bowel or bladder habits. · Genitourinary: No dysuria, trouble voiding, or hematuria. · Musculoskeletal:  No gait disturbance, weakness or joint complaints. · Integumentary: No rash or pruritis.   · Neurological: No headache, diplopia, change in muscle strength, numbness or tingling. No change in gait, balance, coordination, mood, affect, memory, mentation, behavior. · Psychiatric: No anxiety, no depression. · Endocrine: No malaise, fatigue or temperature intolerance. No excessive thirst, fluid intake, or urination. No tremor. · Hematologic/Lymphatic: No abnormal bruising or bleeding, blood clots or swollen lymph nodes. · Allergic/Immunologic: No nasal congestion or hives. Physical Examination:    Vitals:    11/23/21 1317   BP: 122/72   Pulse: 85   SpO2: 98%   Weight: 247 lb 3.2 oz (112.1 kg)   Height: 5' 6\" (1.676 m)        Constitutional and General Appearance: Warm and dry, no apparent distress, normal coloration  HEENT:  Normocephalic, atraumatic  Respiratory:  · Normal excursion and expansion without use of accessory muscles  · Resp Auscultation: Normal breath sounds without dullness  Cardiovascular:  · The apical impulses not displaced  · Heart tones are crisp and normal  · JVP normal cm H2O  · Regular rate and rhythm  · Peripheral pulses are symmetrical and full  · There is no clubbing, cyanosis of the extremities.   · no edema  · Pedal Pulses: 2+ and equal   Abdomen:  · No masses or tenderness  · Liver/Spleen: No Abnormalities Noted  Neurological/Psychiatric:  · Alert and oriented in all spheres  · Moves all extremities well  · Exhibits normal gait balance and coordination  · No abnormalities of mood, affect, memory, mentation, or behavior are noted    Lab Data:    CBC:   Lab Results   Component Value Date    WBC 8.4 03/04/2021    WBC 10.2 01/13/2021    WBC 9.7 12/03/2020    RBC 4.31 03/04/2021    RBC 4.69 01/13/2021    RBC 5.10 12/03/2020    HGB 13.7 03/04/2021    HGB 13.7 01/13/2021    HGB 14.5 12/03/2020    HCT 40.3 03/04/2021    HCT 40.8 01/13/2021    HCT 43.1 12/03/2020    MCV 93.3 03/04/2021    MCV 86.9 01/13/2021    MCV 84.5 12/03/2020    RDW 14.8 03/04/2021    RDW 17.5 01/13/2021    RDW 15.5 12/03/2020     03/04/2021     01/13/2021    PLT 236 12/03/2020     BMP:  Lab Results   Component Value Date     09/22/2021     05/20/2021     03/04/2021    K 3.9 09/22/2021    K 4.0 05/20/2021    K 4.1 03/04/2021    K 3.4 10/08/2020    K 2.9 10/07/2020    K 3.4 10/06/2020     09/22/2021     05/20/2021    CL 98 03/04/2021    CO2 23 09/22/2021    CO2 26 05/20/2021    CO2 27 03/04/2021    PHOS 3.5 11/12/2020    BUN 21 09/22/2021    BUN 18 05/20/2021    BUN 22 03/04/2021    CREATININE 1.0 09/22/2021    CREATININE 1.2 05/20/2021    CREATININE 0.9 03/04/2021     BNP:   Lab Results   Component Value Date    PROBNP 156 09/22/2021    PROBNP 326 01/13/2021    PROBNP 372 12/03/2020     Cardiac Imaging:  RHC/Biopsy 1/13/21 Dr [facility]  An  was used to explain the procedure to the patient. Asiya Simons was given time to ask questions.     Pressures were as follows:  RA: 5 mmHg  RV:  42/4 mmHg  PA:  38/15, mean 22 mmHg  PCWP:  9 mmHg  Thermodilution CO: 8.67   Thermodilution CI:  4.29  Shirley CO:  6.56  Shirley CI:  3.24  PA Sat:  74%  PVR:  120 dynes, 1.5 Wood units     An endomyocardial biopsy was performed via RIJ vein.  10 RV endomyocardial pieces were obtained and sent to  pathology.   Impression:  1.  Normal filling pressures  2.  Normal cardiac output  3.  No evidence of pulmonary artery hypertension   Plan:  1.  Await endomyocardial biopsy results (Negative for Amyloid, Granulomas and Inflammation)   2.  Continue current meds         12/3/2020 [facility]  Cardiac Cath :   Anatomy: Right dominant  Difficult engagement from radial approach, requiring AL2  LM- Patent   LAD-Patent  Cx-Patent   RCA-Patent  LVEDP- 15  AO 109/71     Intervention  Normal coronary arteries      Contrast: 165 ml   Flouro Time: 32.8  Access: Right radial artery     Impression  - Normal coronary arteries  - Difficult engagement of left main given aortic tortuosity and high takeoff, required AL2     Recommendation  ~ Aggressive treatment of non-ischemic cardiomyopathy   ~ Right heart cath and biopsy scheduled for 1/13/2020        Echo 10/3/2020: -Severe LVH with global hypokinesis and EF 40-45%. Septal endocardium is   very bright suggesting of an infiltrative process. (There is no EKG on the chart to evaluate for voltage).  -Mild to moderate mitral regurgitation with moderately enlarged LA.   -IVC size is dilated (>2.1 cm) but collapses > 50% with respiration consistent with elevated RA pressure (8 mmHg).         Cath: None  Holter: None  EKG 10/1/2020: NSR 86, LAFB, lateral infarct     Lexiscan heena 10/6/2020:   Small sized anterior and infero-basal minimally reversible defect of     moderate intensity. Dilated left ventricular with mildly reduced ejection fraction at 44%.       Overall findings represent a intermediate risk scan.         Stress Protocols          Resting ECG     Normal sinus rhythm. LVH.         Cardiac MRI w/wo contrast 11/6/20:  Mildly dilated left ventricle with diffuse thinning of the lateral wall of the LV myocardium. There is prominent trabeculation predominately within the apex and along the lateral wall which also demonstrates mild dyskinesia. There is mild delayed    hyperenhancement throughout most of the LV myocardium with some degree of subendocardial sparing along the septum. There is focal transmural delayed hyperenhancement along the mid and apical inferior wall. These findings could represent an infiltrative    process such as myocarditis or sarcoidosis with a superimposed inferior wall ischemic process. The thinning of the lateral myocardium and prominent trabeculation also raises the possibility of noncompaction.         Global hypokinesis with ejection fraction of 39%.        Assessment:    1. Hypertrophic cardiomyopathy ([address]    2. Vitamin D deficiency    3. LVH (left ventricular hypertrophy)    4. Essential hypertension        Plan:   Patient Instructions   1. Check blood work today  2. Continue all current medications  3.   I will call after I see lab results  4.   RTO in 3 monhts    I appreciate the opportunity of cooperating in the care of this individual.    YARI Li - CNS, CNS, 11/23/2021, 3:32 PM

## 2021-11-24 ENCOUNTER — TELEPHONE (OUTPATIENT)
Dept: CARDIOLOGY CLINIC | Age: 41
End: 2021-11-24

## 2021-11-24 NOTE — TELEPHONE ENCOUNTER
----- Message from YARI Vega - CNS sent at 11/24/2021  8:38 AM EST -----  His fluid level and labs are great  Continue current medications  Will need to use interpretor phone  thanks

## 2021-11-29 RX ORDER — NIFEDIPINE 60 MG/1
60 TABLET, FILM COATED, EXTENDED RELEASE ORAL DAILY
Qty: 90 TABLET | Refills: 1 | Status: SHIPPED | OUTPATIENT
Start: 2021-11-29 | End: 2022-05-25

## 2021-11-29 RX ORDER — HYDROCHLOROTHIAZIDE 25 MG/1
25 TABLET ORAL DAILY
Qty: 90 TABLET | Refills: 1 | Status: SHIPPED | OUTPATIENT
Start: 2021-11-29 | End: 2022-05-25

## 2021-12-03 DIAGNOSIS — E11.65 POORLY CONTROLLED TYPE 2 DIABETES MELLITUS (HCC): ICD-10-CM

## 2021-12-03 LAB
A/G RATIO: 1.5 (ref 1.1–2.2)
ALBUMIN SERPL-MCNC: 4.4 G/DL (ref 3.4–5)
ALP BLD-CCNC: 105 U/L (ref 40–129)
ALT SERPL-CCNC: 27 U/L (ref 10–40)
ANION GAP SERPL CALCULATED.3IONS-SCNC: 13 MMOL/L (ref 3–16)
AST SERPL-CCNC: 20 U/L (ref 15–37)
BASOPHILS ABSOLUTE: 0 K/UL (ref 0–0.2)
BASOPHILS RELATIVE PERCENT: 0.7 %
BILIRUB SERPL-MCNC: 0.3 MG/DL (ref 0–1)
BUN BLDV-MCNC: 14 MG/DL (ref 7–20)
CALCIUM SERPL-MCNC: 9.6 MG/DL (ref 8.3–10.6)
CHLORIDE BLD-SCNC: 99 MMOL/L (ref 99–110)
CHOLESTEROL, FASTING: 179 MG/DL (ref 0–199)
CO2: 28 MMOL/L (ref 21–32)
CREAT SERPL-MCNC: 1 MG/DL (ref 0.9–1.3)
EOSINOPHILS ABSOLUTE: 0.3 K/UL (ref 0–0.6)
EOSINOPHILS RELATIVE PERCENT: 4 %
GFR AFRICAN AMERICAN: >60
GFR NON-AFRICAN AMERICAN: >60
GLUCOSE FASTING: 110 MG/DL (ref 70–99)
HCT VFR BLD CALC: 39.4 % (ref 40.5–52.5)
HDLC SERPL-MCNC: 35 MG/DL (ref 40–60)
HEMOGLOBIN: 12.9 G/DL (ref 13.5–17.5)
LDL CHOLESTEROL CALCULATED: 110 MG/DL
LYMPHOCYTES ABSOLUTE: 1.5 K/UL (ref 1–5.1)
LYMPHOCYTES RELATIVE PERCENT: 20.9 %
MCH RBC QN AUTO: 30.1 PG (ref 26–34)
MCHC RBC AUTO-ENTMCNC: 32.8 G/DL (ref 31–36)
MCV RBC AUTO: 91.8 FL (ref 80–100)
MONOCYTES ABSOLUTE: 0.8 K/UL (ref 0–1.3)
MONOCYTES RELATIVE PERCENT: 11.4 %
NEUTROPHILS ABSOLUTE: 4.6 K/UL (ref 1.7–7.7)
NEUTROPHILS RELATIVE PERCENT: 63 %
PDW BLD-RTO: 14 % (ref 12.4–15.4)
PLATELET # BLD: 262 K/UL (ref 135–450)
PMV BLD AUTO: 8.4 FL (ref 5–10.5)
POTASSIUM SERPL-SCNC: 4.6 MMOL/L (ref 3.5–5.1)
RBC # BLD: 4.3 M/UL (ref 4.2–5.9)
SODIUM BLD-SCNC: 140 MMOL/L (ref 136–145)
TOTAL PROTEIN: 7.3 G/DL (ref 6.4–8.2)
TRIGLYCERIDE, FASTING: 170 MG/DL (ref 0–150)
VLDLC SERPL CALC-MCNC: 34 MG/DL
WBC # BLD: 7.2 K/UL (ref 4–11)

## 2021-12-04 LAB
ESTIMATED AVERAGE GLUCOSE: 148.5 MG/DL
HBA1C MFR BLD: 6.8 %

## 2021-12-06 ENCOUNTER — TELEPHONE (OUTPATIENT)
Dept: CARDIOLOGY CLINIC | Age: 41
End: 2021-12-06

## 2021-12-06 NOTE — TELEPHONE ENCOUNTER
I spoke with pt (via ) . I relayed results to pt. He verbalized understanding. He also requested to make his follow up appointment. I gave the  phone to Abril Palm registrar to help schedule his next appt.

## 2021-12-07 ENCOUNTER — OFFICE VISIT (OUTPATIENT)
Dept: PRIMARY CARE CLINIC | Age: 41
End: 2021-12-07

## 2021-12-07 ENCOUNTER — TELEPHONE (OUTPATIENT)
Dept: PRIMARY CARE CLINIC | Age: 41
End: 2021-12-07

## 2021-12-07 VITALS
BODY MASS INDEX: 39.47 KG/M2 | HEART RATE: 83 BPM | SYSTOLIC BLOOD PRESSURE: 108 MMHG | DIASTOLIC BLOOD PRESSURE: 66 MMHG | HEIGHT: 66 IN | WEIGHT: 245.6 LBS

## 2021-12-07 DIAGNOSIS — E11.65 POORLY CONTROLLED TYPE 2 DIABETES MELLITUS (HCC): ICD-10-CM

## 2021-12-07 DIAGNOSIS — Z23 NEED FOR VACCINATION FOR H FLU TYPE B: Primary | ICD-10-CM

## 2021-12-07 PROCEDURE — 90674 CCIIV4 VAC NO PRSV 0.5 ML IM: CPT | Performed by: INTERNAL MEDICINE

## 2021-12-07 PROCEDURE — 99213 OFFICE O/P EST LOW 20 MIN: CPT | Performed by: INTERNAL MEDICINE

## 2021-12-07 PROCEDURE — 90471 IMMUNIZATION ADMIN: CPT | Performed by: INTERNAL MEDICINE

## 2021-12-07 RX ORDER — LOSARTAN POTASSIUM 100 MG/1
TABLET ORAL
Qty: 90 TABLET | Refills: 1 | Status: SHIPPED | OUTPATIENT
Start: 2021-12-07 | End: 2022-05-25

## 2021-12-07 NOTE — PROGRESS NOTES
12/7/2021   [place]  1980    The patients PMH, surgical history, family history, medications, allergies were all reviewed and updated as appropriate today. REQUIRES     Current Outpatient Medications on File Prior to Visit   Medication Sig Dispense Refill    hydroCHLOROthiazide (HYDRODIURIL) 25 MG tablet TAKE 1 TABLET BY MOUTH DAILY 90 tablet 1    NIFEdipine (ADALAT CC) 60 MG extended release tablet TAKE 1 TABLET BY MOUTH DAILY 90 tablet 1    metFORMIN (GLUCOPHAGE) 1000 MG tablet TAKE 1 TABLET BY MOUTH TWICE DAILY WITH MEALS 180 tablet 1    glipiZIDE (GLUCOTROL) 5 MG tablet TAKE 3 TABLETS BY MOUTH EVERY MORNING BEFORE BREAKFAST 60 tablet 5    carvedilol (COREG) 12.5 MG tablet Take 2 tablets by mouth 2 times daily 120 tablet 0    potassium chloride (KLOR-CON M) 20 MEQ extended release tablet TAKE 1 TABLET BY MOUTH DAILY 90 tablet 1    vitamin D (ERGOCALCIFEROL) 1.25 MG (96901 UT) CAPS capsule Take 1 capsule by mouth once a week 12 capsule 1    losartan (COZAAR) 100 MG tablet TAKE 1 TABLET BY MOUTH DAILY 90 tablet 1    AgaMatrix Ultra-Thin Lancets MISC Check Glucose 3 times a day. 100 each 11    aspirin EC 81 MG EC tablet Take 1 tablet by mouth daily 90 tablet 1    blood glucose test strips (FREESTYLE LITE) strip 1 each by In Vitro route daily ICD code - Ell.9 100 each 3    Alcohol Swabs (ALCOHOL PREP) 70 % PADS 1 each by Does not apply route daily 100 each 3    Blood Glucose Monitoring Suppl (FREESTYLE FREEDOM LITE) w/Device KIT 1 kit by Does not apply route once for 1 dose 1 kit 0     No current facility-administered medications on file prior to visit.         Chief Complaint   Patient presents with    Diabetes       HPI:  Here for DM f/u  , A1C 6.8    Review of Systems-went to Urgent Care earlier today re: jaw pain    OBJECTIVE:  /66 (Site: Left Upper Arm, Position: Sitting, Cuff Size: Large Adult)   Pulse 83   Ht 5' 6\" (1.676 m)   Wt 245 lb 9.6 oz (111.4 kg)   BMI 39.64 kg/m²    Physical Exam    Data Review:   CBC:   Lab Results   Component Value Date    WBC 7.2 12/03/2021    WBC 8.4 03/04/2021    WBC 10.2 01/13/2021    HGB 12.9 12/03/2021    HGB 13.7 03/04/2021    HGB 13.7 01/13/2021    HCT 39.4 12/03/2021    HCT 40.3 03/04/2021    HCT 40.8 01/13/2021    MCV 91.8 12/03/2021    MCV 93.3 03/04/2021    MCV 86.9 01/13/2021     12/03/2021     03/04/2021     01/13/2021     Chemistry:   Lab Results   Component Value Date     12/03/2021     11/23/2021     09/22/2021    K 4.6 12/03/2021    K 3.9 11/23/2021    K 3.9 09/22/2021    K 3.4 10/08/2020    K 2.9 10/07/2020    K 3.4 10/06/2020    CL 99 12/03/2021    CL 98 11/23/2021     09/22/2021    CO2 28 12/03/2021    CO2 25 11/23/2021    CO2 23 09/22/2021    PHOS 3.5 11/12/2020    BUN 14 12/03/2021    BUN 17 11/23/2021    BUN 21 09/22/2021    CREATININE 1.0 12/03/2021    CREATININE 1.1 11/23/2021    CREATININE 1.0 09/22/2021     Hepatic Function:   Lab Results   Component Value Date    AST 20 12/03/2021    AST 17 03/04/2021    AST 20 11/12/2020    ALT 27 12/03/2021    ALT 24 03/04/2021    ALT 23 11/12/2020    BILITOT 0.3 12/03/2021    BILITOT <0.2 03/04/2021    BILITOT 0.5 11/12/2020    ALKPHOS 105 12/03/2021    ALKPHOS 94 03/04/2021    ALKPHOS 89 11/12/2020     No results found for: LIPASE, AMYLASE  Lipids:   Lab Results   Component Value Date    HDL 35 12/03/2021       ASSESSMENT/PLAN  There are no diagnoses linked to this encounter. No follow-ups on file.    Electronically signed by Helena Vasquez MD on 12/7/2021 at 3:09 PM         Went to Urgent Care earlier today but wants to be seen here again today anyway due to ear pain

## 2022-01-06 RX ORDER — CARVEDILOL 12.5 MG/1
TABLET ORAL
Qty: 180 TABLET | Refills: 1 | Status: SHIPPED | OUTPATIENT
Start: 2022-01-06 | End: 2022-04-11

## 2022-02-07 ENCOUNTER — HOSPITAL ENCOUNTER (OUTPATIENT)
Age: 42
Discharge: HOME | End: 2022-02-07

## 2022-02-07 ENCOUNTER — OFFICE VISIT (OUTPATIENT)
Dept: CARDIOLOGY CLINIC | Age: 42
End: 2022-02-07

## 2022-02-07 VITALS
HEART RATE: 87 BPM | SYSTOLIC BLOOD PRESSURE: 112 MMHG | DIASTOLIC BLOOD PRESSURE: 68 MMHG | HEIGHT: 70 IN | OXYGEN SATURATION: 95 % | WEIGHT: 243.7 LBS | BODY MASS INDEX: 34.89 KG/M2

## 2022-02-07 DIAGNOSIS — I10 ESSENTIAL HYPERTENSION: ICD-10-CM

## 2022-02-07 DIAGNOSIS — I51.7 LVH (LEFT VENTRICULAR HYPERTROPHY): ICD-10-CM

## 2022-02-07 DIAGNOSIS — I42.2 HYPERTROPHIC CARDIOMYOPATHY (HCC): Primary | ICD-10-CM

## 2022-02-07 DIAGNOSIS — E55.9 VITAMIN D DEFICIENCY: ICD-10-CM

## 2022-02-07 LAB
ANION GAP SERPL CALCULATED.3IONS-SCNC: 15 MMOL/L (ref 3–16)
BUN BLDV-MCNC: 26 MG/DL (ref 7–20)
CALCIUM SERPL-MCNC: 9.7 MG/DL (ref 8.3–10.6)
CHLORIDE BLD-SCNC: 100 MMOL/L (ref 99–110)
CO2: 26 MMOL/L (ref 21–32)
CREAT SERPL-MCNC: 1.3 MG/DL (ref 0.9–1.3)
FERRITIN: 191.5 NG/ML (ref 30–400)
GFR AFRICAN AMERICAN: >60
GFR NON-AFRICAN AMERICAN: >60
GLUCOSE BLD-MCNC: 168 MG/DL (ref 70–99)
IRON SATURATION: 29 % (ref 20–50)
IRON: 77 UG/DL (ref 59–158)
POTASSIUM SERPL-SCNC: 4.4 MMOL/L (ref 3.5–5.1)
PRO-BNP: 170 PG/ML (ref 0–124)
SODIUM BLD-SCNC: 141 MMOL/L (ref 136–145)
TOTAL IRON BINDING CAPACITY: 268 UG/DL (ref 260–445)
VITAMIN D 25-HYDROXY: 13 NG/ML

## 2022-02-07 PROCEDURE — 83550 IRON BINDING TEST: CPT

## 2022-02-07 PROCEDURE — 83880 ASSAY OF NATRIURETIC PEPTIDE: CPT

## 2022-02-07 PROCEDURE — 99214 OFFICE O/P EST MOD 30 MIN: CPT | Performed by: CLINICAL NURSE SPECIALIST

## 2022-02-07 PROCEDURE — 82306 VITAMIN D 25 HYDROXY: CPT

## 2022-02-07 PROCEDURE — 80048 BASIC METABOLIC PNL TOTAL CA: CPT

## 2022-02-07 PROCEDURE — 82728 ASSAY OF FERRITIN: CPT

## 2022-02-07 PROCEDURE — 83540 ASSAY OF IRON: CPT

## 2022-02-07 PROCEDURE — 36415 COLL VENOUS BLD VENIPUNCTURE: CPT

## 2022-02-07 RX ORDER — ERGOCALCIFEROL 1.25 MG/1
50000 CAPSULE ORAL WEEKLY
Qty: 12 CAPSULE | Refills: 1 | Status: CANCELLED | OUTPATIENT
Start: 2022-02-07

## 2022-02-07 NOTE — PROGRESS NOTES
[address]  Progress Note    Primary Care Doctor:  Josselin Cooper MD    Chief Complaint   Patient presents with    Hypertension    Cardiomyopathy    3 Month Follow-Up     no cardio symptoms        History of Present Illness:  40 y.o. male with history of hypertension, diabetes with normal A1c, hypertrophic cardiomyopathy, [place] speaking    I had the pleasure of seeing Barb Garcia in follow up for hypertrophic cardiomyopathy. He is ambulatory and Basil Stager is our interpretor. He is doing well, no complaints of chest pain, shortness of breath, palpitations or edema. He is taking all of his medications but ran out of vitamin d (out for a couple months). His last cbc with Hgb 12.9, denies any blood in stool, no colonoscopy    Past Medical History:   has no past medical history on file. Surgical History:   has a past surgical history that includes Toe amputation (Right, 10/5/2020). Social History:   reports that he has quit smoking. His smoking use included cigarettes. He has a 2.50 pack-year smoking history. He has never used smokeless tobacco. He reports previous alcohol use. He reports that he does not use drugs. Family History:   Family History   Problem Relation Age of Onset    Diabetes Mother     Diabetes Father     Diabetes Sister        Home Medications:  Prior to Admission medications    Medication Sig Start Date End Date Taking?  Authorizing Provider   carvedilol (COREG) 12.5 MG tablet TAKE 1 TABLET BY MOUTH TWICE DAILY WITH MEALS 1/6/22  Yes Josselin Cooper MD   losartan (COZAAR) 100 MG tablet TAKE 1 TABLET BY MOUTH DAILY 12/7/21  Yes Josselin Cooper MD   hydroCHLOROthiazide (HYDRODIURIL) 25 MG tablet TAKE 1 TABLET BY MOUTH DAILY 11/29/21  Yes oJsselin Cooper MD   NIFEdipine (ADALAT CC) 60 MG extended release tablet TAKE 1 TABLET BY MOUTH DAILY 11/29/21  Yes Josselin Cooper MD   metFORMIN (GLUCOPHAGE) 1000 MG tablet TAKE 1 TABLET BY MOUTH TWICE DAILY WITH MEALS 11/29/21  Yes Josselin Cooper MD glipiZIDE (GLUCOTROL) 5 MG tablet TAKE 3 TABLETS BY MOUTH EVERY MORNING BEFORE BREAKFAST 11/23/21  Yes Joi Judd MD   potassium chloride (KLOR-CON M) 20 MEQ extended release tablet TAKE 1 TABLET BY MOUTH DAILY 11/9/21  Yes Joi Judd MD   AgaMatrix Ultra-Thin Lancets MISC Check Glucose 3 times a day. 3/3/21  Yes Joi Judd MD   aspirin EC 81 MG EC tablet Take 1 tablet by mouth daily 11/6/20  Yes Joi Judd MD   blood glucose test strips (FREESTYLE LITE) strip 1 each by In Vitro route daily ICD code - Ell.9 11/6/20  Yes Joi Judd MD   Alcohol Swabs (ALCOHOL PREP) 70 % PADS 1 each by Does not apply route daily 10/8/20  Yes Sheila Steele PA-C   vitamin D (ERGOCALCIFEROL) 1.25 MG (98083 UT) CAPS capsule Take 1 capsule by mouth once a week  Patient not taking: Reported on 2/7/2022 9/27/21   [facility]   Blood Glucose Monitoring Suppl (FREESTYLE FREEDOM LITE) w/Device KIT 1 kit by Does not apply route once for 1 dose 10/8/20 11/16/20  Sheila Steele PA-C        Allergies:  Patient has no known allergies. Review of Systems:   · Constitutional: there has been no unanticipated weight loss. There's been no change in energy level, sleep pattern, or activity level. · Eyes: No visual changes or diplopia. No scleral icterus. · ENT: No Headaches, hearing loss or vertigo. No mouth sores or sore throat. · Cardiovascular: Reviewed in HPI  · Respiratory: No cough or wheezing, no sputum production. No hematemesis. · Gastrointestinal: No abdominal pain, appetite loss, blood in stools. No change in bowel or bladder habits. · Genitourinary: No dysuria, trouble voiding, or hematuria. · Musculoskeletal:  No gait disturbance, weakness or joint complaints. · Integumentary: No rash or pruritis. · Neurological: No headache, diplopia, change in muscle strength, numbness or tingling. No change in gait, balance, coordination, mood, affect, memory, mentation, behavior.   · Psychiatric: No anxiety, no depression. · Endocrine: No malaise, fatigue or temperature intolerance. No excessive thirst, fluid intake, or urination. No tremor. · Hematologic/Lymphatic: No abnormal bruising or bleeding, blood clots or swollen lymph nodes. · Allergic/Immunologic: No nasal congestion or hives. Physical Examination:    Vitals:    02/07/22 1203   BP: 112/68   Pulse: 87   SpO2: 95%   Weight: 243 lb 11.2 oz (110.5 kg)   Height: 5' 10\" (1.778 m)        Constitutional and General Appearance: Warm and dry, no apparent distress, normal coloration  HEENT:  Normocephalic, atraumatic  Respiratory:  · Normal excursion and expansion without use of accessory muscles  · Resp Auscultation: Normal breath sounds without dullness  Cardiovascular:  · The apical impulses not displaced  · Heart tones are crisp and normal  · JVP normal cm H2O  · Regular rate and rhythm  · Peripheral pulses are symmetrical and full  · There is no clubbing, cyanosis of the extremities.   · no edema  · Pedal Pulses: 2+ and equal   Abdomen:  · No masses or tenderness  · Liver/Spleen: No Abnormalities Noted  Neurological/Psychiatric:  · Alert and oriented in all spheres  · Moves all extremities well  · Exhibits normal gait balance and coordination  · No abnormalities of mood, affect, memory, mentation, or behavior are noted    Lab Data:    CBC:   Lab Results   Component Value Date    WBC 7.2 12/03/2021    WBC 8.4 03/04/2021    WBC 10.2 01/13/2021    RBC 4.30 12/03/2021    RBC 4.31 03/04/2021    RBC 4.69 01/13/2021    HGB 12.9 12/03/2021    HGB 13.7 03/04/2021    HGB 13.7 01/13/2021    HCT 39.4 12/03/2021    HCT 40.3 03/04/2021    HCT 40.8 01/13/2021    MCV 91.8 12/03/2021    MCV 93.3 03/04/2021    MCV 86.9 01/13/2021    RDW 14.0 12/03/2021    RDW 14.8 03/04/2021    RDW 17.5 01/13/2021     12/03/2021     03/04/2021     01/13/2021     BMP:  Lab Results   Component Value Date     12/03/2021     11/23/2021     09/22/2021    K 4.6 12/03/2021    K 3.9 11/23/2021    K 3.9 09/22/2021    K 3.4 10/08/2020    K 2.9 10/07/2020    K 3.4 10/06/2020    CL 99 12/03/2021    CL 98 11/23/2021     09/22/2021    CO2 28 12/03/2021    CO2 25 11/23/2021    CO2 23 09/22/2021    PHOS 3.5 11/12/2020    BUN 14 12/03/2021    BUN 17 11/23/2021    BUN 21 09/22/2021    CREATININE 1.0 12/03/2021    CREATININE 1.1 11/23/2021    CREATININE 1.0 09/22/2021     BNP:   Lab Results   Component Value Date    PROBNP 172 11/23/2021    PROBNP 156 09/22/2021    PROBNP 326 01/13/2021     Cardiac Imaging:  RHC/Biopsy 1/13/21 Dr Gaurav Ross  An  was used to explain the procedure to the patient. [facility] was given time to ask questions.     Pressures were as follows:  RA: 5 mmHg  RV:  42/4 mmHg  PA:  38/15, mean 22 mmHg  PCWP:  9 mmHg  Thermodilution CO: 8.67   Thermodilution CI:  4.29  Shirley CO:  6.56  Shirley CI:  3.24  PA Sat:  74%  PVR:  120 dynes, 1.5 Wood units     An endomyocardial biopsy was performed via RIJ vein.  10 RV endomyocardial pieces were obtained and sent to  pathology.   Impression:  1.  Normal filling pressures  2.  Normal cardiac output  3.  No evidence of pulmonary artery hypertension   Plan:  1.  Await endomyocardial biopsy results (Negative for Amyloid, Granulomas and Inflammation)   2.  Continue current meds         12/3/2020 [facility]  Cardiac Cath :   Anatomy: Right dominant  Difficult engagement from radial approach, requiring AL2  LM- Patent   LAD-Patent  Cx-Patent   RCA-Patent  LVEDP- 15  AO 109/71     Intervention  Normal coronary arteries      Contrast: 165 ml   Flouro Time: 32.8  Access: Right radial artery     Impression  - Normal coronary arteries  - Difficult engagement of left main given aortic tortuosity and high takeoff, required AL2     Recommendation  ~ Aggressive treatment of non-ischemic cardiomyopathy   ~ Right heart cath and biopsy scheduled for 1/13/2020        Echo 10/3/2020: -Severe LVH with global hypokinesis and EF 40-45%. Septal endocardium is   very bright suggesting of an infiltrative process. (There is no EKG on the chart to evaluate for voltage).  -Mild to moderate mitral regurgitation with moderately enlarged LA.   -IVC size is dilated (>2.1 cm) but collapses > 50% with respiration consistent with elevated RA pressure (8 mmHg).         Cath: None  Holter: None  EKG 10/1/2020: NSR 86, LAFB, lateral infarct     Lexiscan heena 10/6/2020:   Small sized anterior and infero-basal minimally reversible defect of     moderate intensity. Dilated left ventricular with mildly reduced ejection fraction at 44%.       Overall findings represent a intermediate risk scan.         Stress Protocols          Resting ECG     Normal sinus rhythm. LVH.         Cardiac MRI w/wo contrast 11/6/20:  Mildly dilated left ventricle with diffuse thinning of the lateral wall of the LV myocardium. There is prominent trabeculation predominately within the apex and along the lateral wall which also demonstrates mild dyskinesia. There is mild delayed    hyperenhancement throughout most of the LV myocardium with some degree of subendocardial sparing along the septum. There is focal transmural delayed hyperenhancement along the mid and apical inferior wall. These findings could represent an infiltrative    process such as myocarditis or sarcoidosis with a superimposed inferior wall ischemic process. The thinning of the lateral myocardium and prominent trabeculation also raises the possibility of noncompaction.         Global hypokinesis with ejection fraction of 39%.        Assessment:    1. Hypertrophic cardiomyopathy ([address]    2. Vitamin D deficiency    3. LVH (left ventricular hypertrophy)    4. Essential hypertension        Plan:   Patient Instructions   1. Continue current medications  2. Check blood work today  3.   RTO in 6 months    I appreciate the opportunity of cooperating in the care of this individual.    YARI Estrada - CNS, CNS, 2/7/2022, 2:02 PM

## 2022-02-08 ENCOUNTER — TELEPHONE (OUTPATIENT)
Dept: CARDIOLOGY CLINIC | Age: 42
End: 2022-02-08

## 2022-02-08 RX ORDER — ERGOCALCIFEROL 1.25 MG/1
50000 CAPSULE ORAL WEEKLY
Qty: 12 CAPSULE | Refills: 1 | Status: SHIPPED | OUTPATIENT
Start: 2022-02-08 | End: 2022-06-07 | Stop reason: SDUPTHER

## 2022-02-08 NOTE — TELEPHONE ENCOUNTER
----- Message from YARI Palm sent at 2/8/2022  8:24 AM EST -----  Labs are good  Vitamin d is low   I have sent script for weekly [id number] iu once a week to his pharmacy  Speaks [place]  thanks

## 2022-02-08 NOTE — TELEPHONE ENCOUNTER
Tried to reach patient, [facility] about lab results and to call about medication changes.

## 2022-02-24 RX ORDER — POTASSIUM CHLORIDE 20 MEQ/1
20 TABLET, EXTENDED RELEASE ORAL DAILY
Qty: 90 TABLET | Refills: 1 | Status: SHIPPED | OUTPATIENT
Start: 2022-02-24 | End: 2022-06-07 | Stop reason: SDUPTHER

## 2022-03-30 RX ORDER — GLIPIZIDE 5 MG/1
TABLET ORAL
Qty: 60 TABLET | Refills: 5 | Status: SHIPPED | OUTPATIENT
Start: 2022-03-30 | End: 2022-06-07 | Stop reason: SDUPTHER

## 2022-04-11 RX ORDER — CARVEDILOL 12.5 MG/1
TABLET ORAL
Qty: 180 TABLET | Refills: 1 | Status: SHIPPED | OUTPATIENT
Start: 2022-04-11 | End: 2022-07-18

## 2022-05-25 DIAGNOSIS — E11.65 POORLY CONTROLLED TYPE 2 DIABETES MELLITUS (HCC): ICD-10-CM

## 2022-05-25 RX ORDER — HYDROCHLOROTHIAZIDE 25 MG/1
25 TABLET ORAL DAILY
Qty: 90 TABLET | Refills: 1 | Status: SHIPPED | OUTPATIENT
Start: 2022-05-25

## 2022-05-25 RX ORDER — NIFEDIPINE 60 MG/1
60 TABLET, FILM COATED, EXTENDED RELEASE ORAL DAILY
Qty: 90 TABLET | Refills: 1 | Status: SHIPPED | OUTPATIENT
Start: 2022-05-25

## 2022-05-25 RX ORDER — LOSARTAN POTASSIUM 100 MG/1
TABLET ORAL
Qty: 90 TABLET | Refills: 1 | Status: SHIPPED | OUTPATIENT
Start: 2022-05-25

## 2022-06-03 DIAGNOSIS — E11.65 POORLY CONTROLLED TYPE 2 DIABETES MELLITUS (HCC): ICD-10-CM

## 2022-06-03 LAB
A/G RATIO: 1.4 (ref 1.1–2.2)
ALBUMIN SERPL-MCNC: 4 G/DL (ref 3.4–5)
ALP BLD-CCNC: 91 U/L (ref 40–129)
ALT SERPL-CCNC: 25 U/L (ref 10–40)
ANION GAP SERPL CALCULATED.3IONS-SCNC: 17 MMOL/L (ref 3–16)
AST SERPL-CCNC: 16 U/L (ref 15–37)
BASOPHILS ABSOLUTE: 0 K/UL (ref 0–0.2)
BASOPHILS RELATIVE PERCENT: 0.6 %
BILIRUB SERPL-MCNC: 0.3 MG/DL (ref 0–1)
BUN BLDV-MCNC: 22 MG/DL (ref 7–20)
CALCIUM SERPL-MCNC: 9.6 MG/DL (ref 8.3–10.6)
CHLORIDE BLD-SCNC: 101 MMOL/L (ref 99–110)
CHOLESTEROL, FASTING: 208 MG/DL (ref 0–199)
CO2: 24 MMOL/L (ref 21–32)
CREAT SERPL-MCNC: 1.1 MG/DL (ref 0.9–1.3)
EOSINOPHILS ABSOLUTE: 0.3 K/UL (ref 0–0.6)
EOSINOPHILS RELATIVE PERCENT: 3.5 %
GFR AFRICAN AMERICAN: >60
GFR NON-AFRICAN AMERICAN: >60
GLUCOSE FASTING: 102 MG/DL (ref 70–99)
HCT VFR BLD CALC: 37 % (ref 40.5–52.5)
HDLC SERPL-MCNC: 36 MG/DL (ref 40–60)
HEMOGLOBIN: 12.5 G/DL (ref 13.5–17.5)
LDL CHOLESTEROL CALCULATED: 135 MG/DL
LYMPHOCYTES ABSOLUTE: 1.6 K/UL (ref 1–5.1)
LYMPHOCYTES RELATIVE PERCENT: 21.9 %
MCH RBC QN AUTO: 30.9 PG (ref 26–34)
MCHC RBC AUTO-ENTMCNC: 33.8 G/DL (ref 31–36)
MCV RBC AUTO: 91.5 FL (ref 80–100)
MONOCYTES ABSOLUTE: 0.6 K/UL (ref 0–1.3)
MONOCYTES RELATIVE PERCENT: 8.5 %
NEUTROPHILS ABSOLUTE: 4.8 K/UL (ref 1.7–7.7)
NEUTROPHILS RELATIVE PERCENT: 65.5 %
PDW BLD-RTO: 14.9 % (ref 12.4–15.4)
PLATELET # BLD: 272 K/UL (ref 135–450)
PMV BLD AUTO: 8 FL (ref 5–10.5)
POTASSIUM SERPL-SCNC: 4.4 MMOL/L (ref 3.5–5.1)
RBC # BLD: 4.04 M/UL (ref 4.2–5.9)
SODIUM BLD-SCNC: 142 MMOL/L (ref 136–145)
TOTAL PROTEIN: 6.9 G/DL (ref 6.4–8.2)
TRIGLYCERIDE, FASTING: 186 MG/DL (ref 0–150)
VLDLC SERPL CALC-MCNC: 37 MG/DL
WBC # BLD: 7.3 K/UL (ref 4–11)

## 2022-06-04 LAB
ESTIMATED AVERAGE GLUCOSE: 134.1 MG/DL
HBA1C MFR BLD: 6.3 %

## 2022-06-07 ENCOUNTER — OFFICE VISIT (OUTPATIENT)
Dept: PRIMARY CARE CLINIC | Age: 42
End: 2022-06-07

## 2022-06-07 VITALS
BODY MASS INDEX: 35.9 KG/M2 | HEIGHT: 70 IN | WEIGHT: 250.8 LBS | OXYGEN SATURATION: 98 % | SYSTOLIC BLOOD PRESSURE: 154 MMHG | DIASTOLIC BLOOD PRESSURE: 96 MMHG | HEART RATE: 83 BPM

## 2022-06-07 DIAGNOSIS — E55.9 VITAMIN D DEFICIENCY: ICD-10-CM

## 2022-06-07 DIAGNOSIS — I10 ESSENTIAL HYPERTENSION: Primary | ICD-10-CM

## 2022-06-07 DIAGNOSIS — E11.65 POORLY CONTROLLED TYPE 2 DIABETES MELLITUS (HCC): ICD-10-CM

## 2022-06-07 LAB
CREATININE URINE POCT: 200
MICROALBUMIN/CREAT 24H UR: 150 MG/G{CREAT}
MICROALBUMIN/CREAT UR-RTO: ABNORMAL

## 2022-06-07 PROCEDURE — 3044F HG A1C LEVEL LT 7.0%: CPT | Performed by: INTERNAL MEDICINE

## 2022-06-07 PROCEDURE — 99214 OFFICE O/P EST MOD 30 MIN: CPT | Performed by: INTERNAL MEDICINE

## 2022-06-07 PROCEDURE — 82044 UR ALBUMIN SEMIQUANTITATIVE: CPT | Performed by: INTERNAL MEDICINE

## 2022-06-07 RX ORDER — GLIPIZIDE 5 MG/1
TABLET ORAL
Qty: 180 TABLET | Refills: 1 | Status: SHIPPED | OUTPATIENT
Start: 2022-06-07

## 2022-06-07 RX ORDER — POTASSIUM CHLORIDE 20 MEQ/1
20 TABLET, EXTENDED RELEASE ORAL DAILY
Qty: 90 TABLET | Refills: 1 | Status: SHIPPED | OUTPATIENT
Start: 2022-06-07

## 2022-06-07 RX ORDER — ERGOCALCIFEROL 1.25 MG/1
50000 CAPSULE ORAL WEEKLY
Qty: 12 CAPSULE | Refills: 1 | Status: SHIPPED | OUTPATIENT
Start: 2022-06-07 | End: 2022-08-08 | Stop reason: SDUPTHER

## 2022-06-07 ASSESSMENT — PATIENT HEALTH QUESTIONNAIRE - PHQ9
1. LITTLE INTEREST OR PLEASURE IN DOING THINGS: 0
2. FEELING DOWN, DEPRESSED OR HOPELESS: 0
SUM OF ALL RESPONSES TO PHQ QUESTIONS 1-9: 0
SUM OF ALL RESPONSES TO PHQ9 QUESTIONS 1 & 2: 0
SUM OF ALL RESPONSES TO PHQ QUESTIONS 1-9: 0

## 2022-06-07 NOTE — PROGRESS NOTES
Filippo Gilbert [id number]    Pt here for DM visit. Pt needs to complete the following. ..   Diabetic microalbuminuria - to be completed in office  Diabetic retinal exam - advised to be completed  Covid Shots or Booster advised

## 2022-06-07 NOTE — PROGRESS NOTES
6/7/2022   Seven Jayantnoemí Edward  1980    The patients PMH, surgical history, family history, medications, allergies were all reviewed and updated as appropriate today. Current Outpatient Medications on File Prior to Visit   Medication Sig Dispense Refill    hydroCHLOROthiazide (HYDRODIURIL) 25 MG tablet TAKE 1 TABLET BY MOUTH DAILY 90 tablet 1    NIFEdipine (ADALAT CC) 60 MG extended release tablet TAKE 1 TABLET BY MOUTH DAILY 90 tablet 1    metFORMIN (GLUCOPHAGE) 1000 MG tablet TAKE 1 TABLET BY MOUTH TWICE DAILY WITH MEALS 180 tablet 1    losartan (COZAAR) 100 MG tablet TAKE 1 TABLET BY MOUTH DAILY 90 tablet 1    carvedilol (COREG) 12.5 MG tablet TAKE 1 TABLET BY MOUTH TWICE DAILY WITH MEALS 180 tablet 1    glipiZIDE (GLUCOTROL) 5 MG tablet TAKE 3 TABLETS BY MOUTH EVERY MORNING BEFORE BREAKFAST 60 tablet 5    potassium chloride (KLOR-CON M) 20 MEQ extended release tablet TAKE 1 TABLET BY MOUTH DAILY 90 tablet 1    vitamin D (ERGOCALCIFEROL) 1.25 MG (51730 UT) CAPS capsule Take 1 capsule by mouth once a week 12 capsule 1    AgaMatrix Ultra-Thin Lancets MISC Check Glucose 3 times a day. 100 each 11    aspirin EC 81 MG EC tablet Take 1 tablet by mouth daily 90 tablet 1    blood glucose test strips (FREESTYLE LITE) strip 1 each by In Vitro route daily ICD code - Ell.9 100 each 3    Alcohol Swabs (ALCOHOL PREP) 70 % PADS 1 each by Does not apply route daily 100 each 3    Blood Glucose Monitoring Suppl (FREESTYLE FREEDOM LITE) w/Device KIT 1 kit by Does not apply route once for 1 dose 1 kit 0     No current facility-administered medications on file prior to visit.         Chief Complaint   Patient presents with    Diabetes     REQUIRES English INTEPRETER    HPI:  Needs recent labs reviewed, A1C 6.3  Due to have vit D level checked again with next labs in 6 months  Needs refills today  Due for MICRAL today  Annual diabetic retinal exam is advised  Wants diabetic foot exam done today  Due for depression screening today    Review of Systems-COVID vax is advised    OBJECTIVE:  Ht 5' 10\" (1.778 m)   BMI 34.97 kg/m²    Ht 5' 10\" (1.778 m)   BMI 34.97 kg/m²     Physical Exam  Vitals and nursing note reviewed. Constitutional:       General: He is not in acute distress. Appearance: He is well-developed. Comments: Ht 5' 10\" (1.778 m)   BMI 34.97 kg/m²    HENT:      Head: Normocephalic and atraumatic. Eyes:      General: No scleral icterus. Right eye: No discharge. Left eye: No discharge. Conjunctiva/sclera: Conjunctivae normal.      Pupils: Pupils are equal, round, and reactive to light. Neck:      Thyroid: No thyromegaly. Vascular: No JVD. Trachea: No tracheal deviation. Cardiovascular:      Rate and Rhythm: Normal rate and regular rhythm. Pulses:           Dorsalis pedis pulses are 2+ on the right side and 2+ on the left side. Posterior tibial pulses are 2+ on the right side and 2+ on the left side. Heart sounds: Normal heart sounds. No murmur heard. Pulmonary:      Effort: Pulmonary effort is normal. No respiratory distress. Breath sounds: Normal breath sounds. No wheezing or rales. Abdominal:      General: Bowel sounds are normal. There is no distension. Palpations: Abdomen is soft. Tenderness: There is no abdominal tenderness. There is no guarding or rebound. Musculoskeletal:         General: No tenderness. Normal range of motion. Cervical back: Normal range of motion and neck supple. Right Lower Extremity: (3rd toe amputatedfrom prior surgery)  Feet:      Right foot:      Protective Sensation: 5 sites tested. 5 sites sensed. Skin integrity: Skin integrity normal.      Toenail Condition: Right toenails are normal.      Left foot:      Protective Sensation: 5 sites tested. 5 sites sensed.       Skin integrity: Skin integrity normal.      Toenail Condition: Left toenails are normal.   Lymphadenopathy:      Cervical: No cervical adenopathy. Skin:     General: Skin is warm and dry. Findings: No erythema or rash. Neurological:      Mental Status: He is alert and oriented to person, place, and time. Cranial Nerves: No cranial nerve deficit. Coordination: Coordination normal.      Deep Tendon Reflexes: Reflexes normal.   Psychiatric:         Behavior: Behavior normal.         Thought Content: Thought content normal.         Data Review:   CBC:   Lab Results   Component Value Date    WBC 7.3 06/03/2022    WBC 7.2 12/03/2021    WBC 8.4 03/04/2021    HGB 12.5 06/03/2022    HGB 12.9 12/03/2021    HGB 13.7 03/04/2021    HCT 37.0 06/03/2022    HCT 39.4 12/03/2021    HCT 40.3 03/04/2021    MCV 91.5 06/03/2022    MCV 91.8 12/03/2021    MCV 93.3 03/04/2021     06/03/2022     12/03/2021     03/04/2021     Chemistry:   Lab Results   Component Value Date     06/03/2022     02/07/2022     12/03/2021    K 4.4 06/03/2022    K 4.4 02/07/2022    K 4.6 12/03/2021    K 3.4 10/08/2020    K 2.9 10/07/2020    K 3.4 10/06/2020     06/03/2022     02/07/2022    CL 99 12/03/2021    CO2 24 06/03/2022    CO2 26 02/07/2022    CO2 28 12/03/2021    PHOS 3.5 11/12/2020    BUN 22 06/03/2022    BUN 26 02/07/2022    BUN 14 12/03/2021    CREATININE 1.1 06/03/2022    CREATININE 1.3 02/07/2022    CREATININE 1.0 12/03/2021     Hepatic Function:   Lab Results   Component Value Date    AST 16 06/03/2022    AST 20 12/03/2021    AST 17 03/04/2021    ALT 25 06/03/2022    ALT 27 12/03/2021    ALT 24 03/04/2021    BILITOT 0.3 06/03/2022    BILITOT 0.3 12/03/2021    BILITOT <0.2 03/04/2021    ALKPHOS 91 06/03/2022    ALKPHOS 105 12/03/2021    ALKPHOS 94 03/04/2021     No results found for: LIPASE, AMYLASE  Lipids:   Lab Results   Component Value Date    HDL 36 06/03/2022       ASSESSMENT/PLAN  1.  Essential hypertension  BP at target range  Refills Cozaar 100, Coreg 12.5 BID, Adalat CC 60 QD HCTZ, and KlorCon 20mEq QDPC today  F/u in 6 months    2. Poorly controlled type 2 diabetes mellitus (HCC)  A1C now in desired range  Continue Glucotrol 5 BID and Metformin 1000 BID and f/u in 6 months with labs prior again    3.  Vitamin D deficiency  Check Vitamin D level with next labs   on 50K weekly    Prashanth Haider MD    Electronically signed by Prashanth Haider MD on 6/7/2022 at 12:36 PM

## 2022-07-18 RX ORDER — CARVEDILOL 12.5 MG/1
TABLET ORAL
Qty: 180 TABLET | Refills: 1 | Status: SHIPPED | OUTPATIENT
Start: 2022-07-18 | End: 2022-10-17

## 2022-08-08 ENCOUNTER — OFFICE VISIT (OUTPATIENT)
Dept: CARDIOLOGY CLINIC | Age: 42
End: 2022-08-08

## 2022-08-08 VITALS
SYSTOLIC BLOOD PRESSURE: 116 MMHG | OXYGEN SATURATION: 98 % | HEART RATE: 79 BPM | WEIGHT: 253.7 LBS | HEIGHT: 62 IN | DIASTOLIC BLOOD PRESSURE: 80 MMHG | BODY MASS INDEX: 46.69 KG/M2

## 2022-08-08 DIAGNOSIS — E55.9 VITAMIN D DEFICIENCY: ICD-10-CM

## 2022-08-08 DIAGNOSIS — I42.2 HYPERTROPHIC CARDIOMYOPATHY (HCC): Primary | ICD-10-CM

## 2022-08-08 DIAGNOSIS — I51.7 LVH (LEFT VENTRICULAR HYPERTROPHY): ICD-10-CM

## 2022-08-08 DIAGNOSIS — I10 ESSENTIAL HYPERTENSION: ICD-10-CM

## 2022-08-08 PROCEDURE — 99214 OFFICE O/P EST MOD 30 MIN: CPT | Performed by: CLINICAL NURSE SPECIALIST

## 2022-08-08 RX ORDER — ERGOCALCIFEROL 1.25 MG/1
50000 CAPSULE ORAL WEEKLY
Qty: 12 CAPSULE | Refills: 1 | Status: SHIPPED | OUTPATIENT
Start: 2022-08-08

## 2022-08-08 NOTE — PROGRESS NOTES
[facility]  Progress Note    Primary Care Doctor:  Bartolo Wren MD    Chief Complaint   Patient presents with    Hypertension    Follow-up        History of Present Illness:  40 y.o. male with history of hypertension, diabetes with normal A1c, hypertrophic cardiomyopathy, [place] speaking    I had the pleasure of seeing Joyce Rebolledo in follow up for hypertrophic cardiomyopathy. He is ambulatory and Robert Bang is our interpretor on a stick. His weight has slowly climbed over the past year [phone]. He denies any increased shortness of breath, palpitations, lightheadedness or edema. Labs reviewed from June. He is taking all his medications and denies eating/drinking more sodium/fluids. Past Medical History:   has no past medical history on file. Surgical History:   has a past surgical history that includes Toe amputation (Right, 10/5/2020). Social History:   reports that he has quit smoking. His smoking use included cigarettes. He has a 2.50 pack-year smoking history. He has never used smokeless tobacco. He reports that he does not currently use alcohol. He reports that he does not use drugs. Family History:   Family History   Problem Relation Age of Onset    Diabetes Mother     Diabetes Father     Diabetes Sister        Home Medications:  Prior to Admission medications    Medication Sig Start Date End Date Taking?  Authorizing Provider   vitamin D (ERGOCALCIFEROL) 1.25 MG (06753 UT) CAPS capsule Take 1 capsule by mouth once a week 8/8/22  Yes YARI Francisco - CNS   carvedilol (COREG) 12.5 MG tablet TAKE 1 TABLET BY MOUTH TWICE DAILY WITH MEALS 7/18/22  Yes Nettie Ponce APRN - CNP   potassium chloride (KLOR-CON M) 20 MEQ extended release tablet Take 1 tablet by mouth daily 6/7/22  Yes Bartolo Wren MD   glipiZIDE (GLUCOTROL) 5 mg tablet TAKE 1 TABLETS BY MOUTH TWICE DAILY 6/7/22  Yes Bartolo Wren MD   hydroCHLOROthiazide (HYDRODIURIL) 25 MG tablet TAKE 1 TABLET BY MOUTH DAILY 5/25/22  Yes Kelly Hernandez MD   NIFEdipine (ADALAT CC) 60 MG extended release tablet TAKE 1 TABLET BY MOUTH DAILY 5/25/22  Yes Kelly Hernandez MD   metFORMIN (GLUCOPHAGE) 1000 MG tablet TAKE 1 TABLET BY MOUTH TWICE DAILY WITH MEALS 5/25/22  Yes Kelly Hernandez MD   losartan (COZAAR) 100 MG tablet TAKE 1 TABLET BY MOUTH DAILY 5/25/22  Yes Kelly Hernandez MD   AgaMatrix Ultra-Thin Lancets MISC Check Glucose 3 times a day. 3/3/21  Yes Kelly Hernandez MD   aspirin EC 81 MG EC tablet Take 1 tablet by mouth daily 11/6/20  Yes Kelly Hernandez MD   blood glucose test strips (FREESTYLE LITE) strip 1 each by In Vitro route daily ICD code - Ell.9 11/6/20  Yes Kelly Hernandez MD   Alcohol Swabs (ALCOHOL PREP) 70 % PADS 1 each by Does not apply route daily 10/8/20  Yes Eyad Pond PA-C   Blood Glucose Monitoring Suppl (FREESTYLE FREEDOM LITE) w/Device KIT 1 kit by Does not apply route once for 1 dose 10/8/20 11/16/20  Eyad Pond PA-C        Allergies:  Patient has no known allergies. Review of Systems:   Constitutional: there has been no unanticipated weight loss. There's been no change in energy level, sleep pattern, or activity level. Eyes: No visual changes or diplopia. No scleral icterus. ENT: No Headaches, hearing loss or vertigo. No mouth sores or sore throat. Cardiovascular: Reviewed in HPI  Respiratory: No cough or wheezing, no sputum production. No hematemesis. Gastrointestinal: No abdominal pain, appetite loss, blood in stools. No change in bowel or bladder habits. Genitourinary: No dysuria, trouble voiding, or hematuria. Musculoskeletal:  No gait disturbance, weakness or joint complaints. Integumentary: No rash or pruritis. Neurological: No headache, diplopia, change in muscle strength, numbness or tingling. No change in gait, balance, coordination, mood, affect, memory, mentation, behavior. Psychiatric: No anxiety, no depression. Endocrine: No malaise, fatigue or temperature intolerance.  No excessive thirst, fluid intake, or urination. No tremor. Hematologic/Lymphatic: No abnormal bruising or bleeding, blood clots or swollen lymph nodes. Allergic/Immunologic: No nasal congestion or hives. Physical Examination:    Vitals:    08/08/22 1239   BP: 116/80   Pulse: 79   SpO2: 98%   Weight: 253 lb 11.2 oz (115.1 kg)   Height: 5' 2\" (1.575 m)        Constitutional and General Appearance: Warm and dry, no apparent distress, normal coloration  HEENT:  Normocephalic, atraumatic  Respiratory:  Normal excursion and expansion without use of accessory muscles  Resp Auscultation: Normal breath sounds without dullness  Cardiovascular: The apical impulses not displaced  Heart tones are crisp and normal  JVP normal cm H2O  Regular rate and rhythm  Peripheral pulses are symmetrical and full  There is no clubbing, cyanosis of the extremities.   no edema  Pedal Pulses: 2+ and equal   Abdomen:  No masses or tenderness  Liver/Spleen: No Abnormalities Noted  Neurological/Psychiatric:  Alert and oriented in all spheres  Moves all extremities well  Exhibits normal gait balance and coordination  No abnormalities of mood, affect, memory, mentation, or behavior are noted    Lab Data:    CBC:   Lab Results   Component Value Date/Time    WBC 7.3 06/03/2022 09:54 AM    WBC 7.2 12/03/2021 09:04 AM    WBC 8.4 03/04/2021 08:00 AM    RBC 4.04 06/03/2022 09:54 AM    RBC 4.30 12/03/2021 09:04 AM    RBC 4.31 03/04/2021 08:00 AM    HGB 12.5 06/03/2022 09:54 AM    HGB 12.9 12/03/2021 09:04 AM    HGB 13.7 03/04/2021 08:00 AM    HCT 37.0 06/03/2022 09:54 AM    HCT 39.4 12/03/2021 09:04 AM    HCT 40.3 03/04/2021 08:00 AM    MCV 91.5 06/03/2022 09:54 AM    MCV 91.8 12/03/2021 09:04 AM    MCV 93.3 03/04/2021 08:00 AM    RDW 14.9 06/03/2022 09:54 AM    RDW 14.0 12/03/2021 09:04 AM    RDW 14.8 03/04/2021 08:00 AM     06/03/2022 09:54 AM     12/03/2021 09:04 AM     03/04/2021 08:00 AM     BMP:  Lab Results   Component Value Date/Time     06/03/2022 09:54 AM     02/07/2022 01:05 PM     12/03/2021 09:04 AM    K 4.4 06/03/2022 09:54 AM    K 4.4 02/07/2022 01:05 PM    K 4.6 12/03/2021 09:04 AM    K 3.4 10/08/2020 05:40 AM    K 2.9 10/07/2020 06:40 AM    K 3.4 10/06/2020 08:05 AM     06/03/2022 09:54 AM     02/07/2022 01:05 PM    CL 99 12/03/2021 09:04 AM    CO2 24 06/03/2022 09:54 AM    CO2 26 02/07/2022 01:05 PM    CO2 28 12/03/2021 09:04 AM    PHOS 3.5 11/12/2020 09:31 AM    BUN 22 06/03/2022 09:54 AM    BUN 26 02/07/2022 01:05 PM    BUN 14 12/03/2021 09:04 AM    CREATININE 1.1 06/03/2022 09:54 AM    CREATININE 1.3 02/07/2022 01:05 PM    CREATININE 1.0 12/03/2021 09:04 AM     BNP:   Lab Results   Component Value Date/Time    PROBNP 170 02/07/2022 01:05 PM    PROBNP 172 11/23/2021 02:12 PM    PROBNP 156 09/22/2021 01:43 PM     Cardiac Imaging:  RHC/Biopsy 1/13/21 Dr Lorena Horan  An  was used to explain the procedure to the patient. He was given time to ask questions. Pressures were as follows:  RA: 5 mmHg  RV:  42/4 mmHg  PA:  38/15, mean 22 mmHg  PCWP:  9 mmHg  Thermodilution CO: 8.67   Thermodilution CI:  4.29  Shirley CO:  6.56  Shirley CI:  3.24  PA Sat:  74%  PVR:  120 dynes, 1.5 Wood units     An endomyocardial biopsy was performed via RIJ vein. 10 RV endomyocardial pieces were obtained and sent to  pathology. Impression:  1. Normal filling pressures  2. Normal cardiac output  3. No evidence of pulmonary artery hypertension   Plan:  1. Await endomyocardial biopsy results (Negative for Amyloid, Granulomas and Inflammation)   2. Continue current meds         12/3/2020 [facility]  Cardiac Cath :   Anatomy: Right dominant  Difficult engagement from radial approach, requiring AL2  LM- Patent   LAD-Patent  Cx-Patent   RCA-Patent  LVEDP- 15  /71     Intervention  Normal coronary arteries      Contrast: 165 ml   Flouro Time: 32.8  Access: Right radial artery     Impression  - Normal coronary arteries  - Difficult engagement of left main given aortic tortuosity and high takeoff, required AL2     Recommendation  ~ Aggressive treatment of non-ischemic cardiomyopathy   ~ Right heart cath and biopsy scheduled for 1/13/2020        Echo 10/3/2020: -Severe LVH with global hypokinesis and EF 40-45%. Septal endocardium is   very bright suggesting of an infiltrative process. (There is no EKG on the chart to evaluate for voltage). -Mild to moderate mitral regurgitation with moderately enlarged LA. -IVC size is dilated (>2.1 cm) but collapses > 50% with respiration consistent with elevated RA pressure (8 mmHg). Cath: None  Holter: None  EKG 10/1/2020: NSR 86, LAFB, lateral infarct     Lexiscan heena 10/6/2020:   Small sized anterior and infero-basal minimally reversible defect of     moderate intensity. Dilated left ventricular with mildly reduced ejection fraction at 44%. Overall findings represent a intermediate risk scan. Stress Protocols          Resting ECG     Normal sinus rhythm. LVH. Cardiac MRI w/wo contrast 11/6/20:  Mildly dilated left ventricle with diffuse thinning of the lateral wall of the LV myocardium. There is prominent trabeculation predominately within the apex and along the lateral wall which also demonstrates mild dyskinesia. There is mild delayed    hyperenhancement throughout most of the LV myocardium with some degree of subendocardial sparing along the septum. There is focal transmural delayed hyperenhancement along the mid and apical inferior wall. These findings could represent an infiltrative    process such as myocarditis or sarcoidosis with a superimposed inferior wall ischemic process. The thinning of the lateral myocardium and prominent trabeculation also raises the possibility of noncompaction. Global hypokinesis with ejection fraction of 39%. Assessment:    1. Hypertrophic cardiomyopathy ([address]    2. Vitamin D deficiency    3.  LVH (left ventricular hypertrophy)    4.  Essential hypertension        Plan:   Patient Instructions   Get out and exercise  Continue all current medications  Blood work in 2 months   RTO in 4-6 months    I appreciate the opportunity of cooperating in the care of this individual.    YARI Mckee - CNS, CNS, 8/8/2022, 2:23 PM

## 2022-10-17 RX ORDER — CARVEDILOL 12.5 MG/1
TABLET ORAL
Qty: 180 TABLET | Refills: 1 | Status: SHIPPED | OUTPATIENT
Start: 2022-10-17

## 2022-11-21 DIAGNOSIS — E11.65 POORLY CONTROLLED TYPE 2 DIABETES MELLITUS (HCC): ICD-10-CM

## 2022-11-21 RX ORDER — HYDROCHLOROTHIAZIDE 25 MG/1
25 TABLET ORAL DAILY
Qty: 90 TABLET | Refills: 1 | Status: SHIPPED | OUTPATIENT
Start: 2022-11-21

## 2022-11-21 RX ORDER — NIFEDIPINE 60 MG/1
60 TABLET, FILM COATED, EXTENDED RELEASE ORAL DAILY
Qty: 90 TABLET | Refills: 1 | Status: SHIPPED | OUTPATIENT
Start: 2022-11-21

## 2022-11-21 RX ORDER — LOSARTAN POTASSIUM 100 MG/1
TABLET ORAL
Qty: 90 TABLET | Refills: 1 | Status: SHIPPED | OUTPATIENT
Start: 2022-11-21

## 2023-04-17 NOTE — PROGRESS NOTES
Infectious Diseases   Progress Note      Admission Date: 10/1/2020  Hospital Day: Hospital Day: 6   Attending: Sugey Dobson MD  Date of service: 10/6/2020     Chief complaint/ Reason for consult:     · Complicated right diabetic foot infection with osteomyelitis of the right third toe  · Fever and bandemia on admission  · Poorly controlled newly diagnosed diabetes mellitus  · Diabetic polyneuropathy    Microbiology:        I have reviewed allavailable micro lab data and cultures    · Blood culture (2/2) - collected on 10/1/2020: Negative      Antibiotics and immunizations:       Current antibiotics: All antibiotics and their doses were reviewed by me    Recent Abx Admin                   linezolid (ZYVOX) IVPB 600 mg (mg) 600 mg New Bag 10/06/20 0603     600 mg New Bag 10/05/20 1758    metroNIDAZOLE (FLAGYL) tablet 500 mg (mg) 500 mg Given 10/06/20 0602     500 mg Given 10/05/20 2154    cefepime (MAXIPIME) 2 g IVPB minibag (g) 2 g New Bag 10/06/20 0311     2 g New Bag 10/05/20 1447                  Immunization History: All immunization history was reviewed by me today. Immunization History   Administered Date(s) Administered    Influenza, Quadv, IM, PF (6 mo and older Fluzone, Flulaval, Fluarix, and 3 yrs and older Afluria) 10/02/2020       Known drug allergies: All allergies were reviewed and updated    No Known Allergies    Social history:     Social History:  All social andepidemiologic history was reviewed and updated by me today as needed. · Tobacco use:   reports that he has quit smoking. His smoking use included cigarettes. He has a 2.50 pack-year smoking history. He has never used smokeless tobacco.  · Alcohol use:   reports current alcohol use of about 3.0 standard drinks of alcohol per week. · Currently lives in: [facility]  ·  reports no history of drug use. Assessment:     The patient is a 45 y.o. old male who  has no past medical history on file.  with following problems:    · Complicated right diabetic foot infection with osteomyelitis of the right third toe-covered with broad-spectrum antibiotics  · Fever and bandemia on admission-resolved  · Poorly controlled newly diagnosed diabetes mellitus  · Diabetic polyneuropathy  · Essential hypertension  · Ex-smoker  · Obesity Class 1 due to excess calorie intake : Body mass index is 32.5 kg/m². ·       Discussion:      The patient is on empiric linezolid, cefepime and Flagyl. He is tolerating the antibiotics okay. The patient underwent right third toe amputation yesterday. Blood cultures from admission are negative so far. Serum creatinine 0.8. Surgical note reviewed. Clearance fragment has been sent for pathology. Plan:     Diagnostic Workup:    · Will order a 12-lead EKG to assess QTc interval.  Previous EKG on 10/3/2020 showed QTc interval of 467 ms  · Continue to follow  fever curve, WBC count and blood cultures  · Follow up on clearance fragment path results from yesterday's surgical specimen    Antimicrobials:    · Will switch IV linezolid to p.o. linezolid 600 mg every 12 hour  · We will stop IV cefepime today  · We will stop oral Flagyl today  · We will order p.o. Cipro 500 mg every 12 hour  · We will follow-up on the clearance recommend path results and will make further recommendations accordingly  · If definitive surgical source control with clear surgical margin obtained, will be able to shorten the antibiotic duration significantly  · Maintain good glycemic control  · Surgical site care  · DVT prophylaxis  · Discussed all above with patient and RN      Drug Monitoring:    · Continue monitoring for antibiotic toxicity as follows: CBC, CMP, QTc interval  · Continue to watch for following: new or worsening fever, new hypotension, hives, lip swelling and redness or purulence at vascular access sites.      I/v access Management:    · Continue to monitor i.v access sites for erythema, induration, discharge or tenderness. · As always, continue efforts to minimize tubes/lines/drains as clinically appropriate to reduce chances of line associated infections. Patient education and counseling:        · The patient was educated in detail about the side-effects of various antibiotics and things to watch for like new rashes, lip swelling, severe reaction, worsening diarrhea, break through fever etc.  · Discussed patient's condition and what to expect. All of the patient's questions were addressed in a satisfactory manner and patient verbalized understanding all instructions. Level of complexity of visit: High     Weight loss counseling:    Extensive weight loss counseling was done. It is important to set a realistic weight loss goal. First goal should be to avoid gaining more weight and staying at current weight (or within 5 percent). People at high risk of developing diabetes who are able to lose 5 percent of their body weight and maintain this weight will reduce their risk of developing diabetes by about 50 percent and reduce their blood pressure. Losing more than 15 percent of  body weight and staying at this weight is an extremely good result, even if you never reach your \"dream\" or \"ideal\" weight. Lifestyle changes including changing eating habits, substituting excess carbohydrates with proteins, stress reduction, using self-help programs like Weight Watchers®, Overeaters Anonymous®, and Take Off Pounds Sensibly (TOPS)© , following DASH diet and increasing exercise or walking briskly daily for half hour to and hour 5-7 days a week was suggested among other measures.  Information was given about various weight loss education programs and their websites like www.cdc.gov/healthyweight, www.choosemyplate.gov and www.health.gov/dietaryguidelines/    TIME SPENT TODAY:     - Spent over  35 minutes on visit (including interval history, physical exam, review of data including labs, cultures, imaging, 10/06/20 1221 10/06/20 1237 10/06/20 1240   BP:  (!) 168/115  (!) 159/102   Pulse:  79  79   Resp:  16 16 16   Temp:  98 °F (36.7 °C)  97.9 °F (36.6 °C)   TempSrc:  Oral  Oral   SpO2:  95% 96% 97%   Weight:       Height: 5' 10\" (1.778 m)          Physical Exam  Vitals signs and nursing note reviewed. Constitutional:       Appearance: Normal appearance. He is well-developed. HENT:      Head: Normocephalic and atraumatic. Right Ear: External ear normal.      Left Ear: External ear normal.      Nose: Nose normal. No congestion or rhinorrhea. Mouth/Throat:      Mouth: Mucous membranes are moist.      Pharynx: No oropharyngeal exudate or posterior oropharyngeal erythema. Eyes:      General: No scleral icterus. Right eye: No discharge. Left eye: No discharge. Conjunctiva/sclera: Conjunctivae normal.      Pupils: Pupils are equal, round, and reactive to light. Neck:      Musculoskeletal: Normal range of motion and neck supple. No neck rigidity or muscular tenderness. Cardiovascular:      Rate and Rhythm: Normal rate and regular rhythm. Pulses: Normal pulses. Heart sounds: No murmur. No friction rub. Pulmonary:      Effort: Pulmonary effort is normal. No respiratory distress. Breath sounds: Normal breath sounds. No stridor. No wheezing, rhonchi or rales. Abdominal:      General: Bowel sounds are normal.      Palpations: Abdomen is soft. Tenderness: There is no abdominal tenderness. There is no right CVA tenderness, left CVA tenderness, guarding or rebound. Musculoskeletal: Normal range of motion. General: No swelling or tenderness. Lymphadenopathy:      Cervical: No cervical adenopathy. Skin:     General: Skin is warm and dry. Coloration: Skin is not jaundiced. Findings: No erythema or rash. Comments: Surgical dressing noted on the right foot   Neurological:      General: No focal deficit present.       Mental Status: He is alert and changes at the PIP joint and findings of osteomyelitis involving the   middle and proximal phalanx of the 3rd digit. No defined fluid collection. VL Extremity Venous Right   Final Result      XR FOOT RIGHT (MIN 3 VIEWS)   Final Result   No acute osseous abnormality right foot. Soft tissue edema presumably reflecting cellulitis, contusion and/or sprain. Follow-up imaging recommended if pain persists or worsens following   conservative management. Medications: All current and past medications were reviewed.      insulin glargine  15 Units Subcutaneous Nightly    hydroCHLOROthiazide  25 mg Oral Daily    glipiZIDE  15 mg Oral QAM AC    metoprolol tartrate  50 mg Oral BID    NIFEdipine  60 mg Oral Daily    insulin lispro  0-18 Units Subcutaneous TID WC    insulin lispro  0-9 Units Subcutaneous Nightly    metFORMIN  1,000 mg Oral BID WC    losartan  100 mg Oral Daily    linezolid  600 mg Intravenous Q12H    metroNIDAZOLE  500 mg Oral 3 times per day    sodium chloride flush  10 mL Intravenous 2 times per day    cefepime  2 g Intravenous Q12H    sodium chloride flush  10 mL Intravenous 2 times per day    enoxaparin  40 mg Subcutaneous Daily        dextrose         potassium chloride **OR** potassium alternative oral replacement **OR** potassium chloride, labetalol, perflutren lipid microspheres, glucose, dextrose, glucagon (rDNA), dextrose, sodium chloride flush, sodium chloride flush, acetaminophen **OR** acetaminophen, polyethylene glycol, ondansetron      Problem list:       Patient Active Problem List   Diagnosis Code    Cellulitis of right foot L03.115    Sepsis ([facility] [address] A41.9    Fever R50.9    Tachycardia A63.8    Neutrophilic leukocytosis U59.2    Poorly controlled type 2 diabetes mellitus ([facility] [address] E11.65    Essential hypertension I10    Cellulitis of skin with lymphangitis L03.90    Hyperglycemia R73.9    Acute hematogenous osteomyelitis of right foot ([facility] [address] M86.071  Ex-smoker Z87.891    Diabetic polyneuropathy associated with type 2 diabetes mellitus ([facility] [address] E11.42    Class 1 obesity due to excess calories with body mass index (BMI) of 32.0 to 32.9 in adult E66.09, Z68.32    Diabetes education, encounter for Z71.89       Please note that this chart was generated using Dragon dictation software. Although every effort was made to ensure the accuracy of this automated transcription, some errors in transcription may have occurred inadvertently. If you may need any clarification, please do not hesitate to contact me through EPIC or at the phone number provided below with my electronic signature. Any pictures or media included in this note were obtained after taking informed verbal consent from the patient and with their approval to include those in the patient's medical record.     Alecia Nicole MD, MPH  10/6/2020 , 2:31 PM   [facility] Infectious Disease   [address] [place] [address], [address] [place] [address]  Office: [phone]  Fax: [phone]  Clinic days:  Tuesday & Thursday PAST MEDICAL HISTORY:  Appendicitis     History of prediabetes     Hyperlipidemia     Hypertension     Morbid obesity     ALLIE on CPAP

## 2024-04-10 NOTE — TELEPHONE ENCOUNTER
----- Message from Elio Sawyer MD sent at 11/23/2020  5:20 PM EST -----  Please call patient and let him know that his labs look good. No changes.   [facility] Detail Level: Detailed
